# Patient Record
Sex: MALE | Race: WHITE | Employment: PART TIME | URBAN - METROPOLITAN AREA
[De-identification: names, ages, dates, MRNs, and addresses within clinical notes are randomized per-mention and may not be internally consistent; named-entity substitution may affect disease eponyms.]

---

## 2019-12-19 ENCOUNTER — APPOINTMENT (OUTPATIENT)
Dept: GENERAL RADIOLOGY | Age: 49
End: 2019-12-19

## 2019-12-19 ENCOUNTER — HOSPITAL ENCOUNTER (EMERGENCY)
Age: 49
Discharge: HOME | End: 2019-12-19
Attending: EMERGENCY MEDICINE

## 2019-12-19 VITALS
BODY MASS INDEX: 25.9 KG/M2 | DIASTOLIC BLOOD PRESSURE: 77 MMHG | WEIGHT: 185 LBS | TEMPERATURE: 97.4 F | HEIGHT: 71 IN | HEART RATE: 74 BPM | OXYGEN SATURATION: 98 % | SYSTOLIC BLOOD PRESSURE: 126 MMHG | RESPIRATION RATE: 14 BRPM
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
## 2019-12-19 DIAGNOSIS — S30.0XXA CONTUSION OF COCCYX, INITIAL ENCOUNTER: Primary | ICD-10-CM

## 2019-12-19 PROCEDURE — 72220 X-RAY EXAM SACRUM TAILBONE: CPT

## 2019-12-19 PROCEDURE — 6370000000 HC RX 637 (ALT 250 FOR IP): Performed by: NURSE PRACTITIONER

## 2019-12-19 PROCEDURE — 72100 X-RAY EXAM L-S SPINE 2/3 VWS: CPT

## 2019-12-19 PROCEDURE — 99284 EMERGENCY DEPT VISIT MOD MDM: CPT

## 2019-12-19 RX ORDER — IBUPROFEN 800 MG/1
800 TABLET ORAL ONCE
Status: COMPLETED | OUTPATIENT
Start: 2019-12-19 | End: 2019-12-19

## 2019-12-19 RX ORDER — IBUPROFEN 800 MG/1
800 TABLET ORAL EVERY 8 HOURS PRN
Qty: 30 TABLET | Refills: 0 | Status: SHIPPED | OUTPATIENT
Start: 2019-12-19 | End: 2020-12-15

## 2019-12-19 RX ADMIN — IBUPROFEN 800 MG: 800 TABLET, FILM COATED ORAL at 10:13

## 2019-12-19 ASSESSMENT — PAIN DESCRIPTION - DESCRIPTORS: DESCRIPTORS: TENDER

## 2019-12-19 ASSESSMENT — ENCOUNTER SYMPTOMS
COLOR CHANGE: 1
BACK PAIN: 1

## 2019-12-19 ASSESSMENT — PAIN DESCRIPTION - FREQUENCY: FREQUENCY: INTERMITTENT

## 2019-12-19 ASSESSMENT — PAIN SCALES - GENERAL
PAINLEVEL_OUTOF10: 7
PAINLEVEL_OUTOF10: 7

## 2019-12-19 ASSESSMENT — PAIN DESCRIPTION - LOCATION: LOCATION: BACK

## 2020-02-01 ENCOUNTER — HOSPITAL ENCOUNTER (OUTPATIENT)
Age: 50
Discharge: HOME | End: 2020-02-01
Payer: MEDICAID

## 2020-02-01 LAB
ABSOLUTE EOS #: 0.07 K/UL (ref 0–0.44)
ABSOLUTE IMMATURE GRANULOCYTE: <0.03 K/UL (ref 0–0.3)
ABSOLUTE LYMPH #: 1.99 K/UL (ref 1.1–3.7)
ABSOLUTE MONO #: 0.81 K/UL (ref 0.1–1.2)
ALBUMIN SERPL-MCNC: 4.2 G/DL (ref 3.5–5.2)
ALBUMIN/GLOBULIN RATIO: 1.6 (ref 1–2.5)
ALP BLD-CCNC: 88 U/L (ref 40–129)
ALT SERPL-CCNC: 23 U/L (ref 5–41)
ANION GAP SERPL CALCULATED.3IONS-SCNC: 10 MMOL/L (ref 9–17)
AST SERPL-CCNC: 24 U/L
BASOPHILS # BLD: 1 % (ref 0–2)
BASOPHILS ABSOLUTE: 0.03 K/UL (ref 0–0.2)
BILIRUB SERPL-MCNC: 0.2 MG/DL (ref 0.3–1.2)
BUN BLDV-MCNC: 11 MG/DL (ref 6–20)
BUN/CREAT BLD: ABNORMAL (ref 9–20)
CALCIUM SERPL-MCNC: 9.3 MG/DL (ref 8.6–10.4)
CHLORIDE BLD-SCNC: 91 MMOL/L (ref 98–107)
CHOLESTEROL/HDL RATIO: 2.3
CHOLESTEROL: 144 MG/DL
CO2: 26 MMOL/L (ref 20–31)
CREAT SERPL-MCNC: 0.7 MG/DL (ref 0.7–1.2)
DIFFERENTIAL TYPE: ABNORMAL
EOSINOPHILS RELATIVE PERCENT: 1 % (ref 1–4)
GFR AFRICAN AMERICAN: >60 ML/MIN
GFR NON-AFRICAN AMERICAN: >60 ML/MIN
GFR SERPL CREATININE-BSD FRML MDRD: ABNORMAL ML/MIN/{1.73_M2}
GFR SERPL CREATININE-BSD FRML MDRD: ABNORMAL ML/MIN/{1.73_M2}
GLUCOSE BLD-MCNC: 101 MG/DL (ref 70–99)
HCT VFR BLD CALC: 41.1 % (ref 40.7–50.3)
HDLC SERPL-MCNC: 62 MG/DL
HEMOGLOBIN: 14 G/DL (ref 13–17)
IMMATURE GRANULOCYTES: 0 %
LDL CHOLESTEROL: 56 MG/DL (ref 0–130)
LYMPHOCYTES # BLD: 34 % (ref 24–43)
MCH RBC QN AUTO: 33.3 PG (ref 25.2–33.5)
MCHC RBC AUTO-ENTMCNC: 34.1 G/DL (ref 28.4–34.8)
MCV RBC AUTO: 97.9 FL (ref 82.6–102.9)
MONOCYTES # BLD: 14 % (ref 3–12)
NRBC AUTOMATED: 0 PER 100 WBC
PDW BLD-RTO: 11.9 % (ref 11.8–14.4)
PLATELET # BLD: 290 K/UL (ref 138–453)
PLATELET ESTIMATE: ABNORMAL
PMV BLD AUTO: 9 FL (ref 8.1–13.5)
POTASSIUM SERPL-SCNC: 4.1 MMOL/L (ref 3.7–5.3)
RBC # BLD: 4.2 M/UL (ref 4.21–5.77)
RBC # BLD: ABNORMAL 10*6/UL
SEG NEUTROPHILS: 50 % (ref 36–65)
SEGMENTED NEUTROPHILS ABSOLUTE COUNT: 2.94 K/UL (ref 1.5–8.1)
SODIUM BLD-SCNC: 127 MMOL/L (ref 135–144)
TOTAL PROTEIN: 6.9 G/DL (ref 6.4–8.3)
TRIGL SERPL-MCNC: 128 MG/DL
TSH SERPL DL<=0.05 MIU/L-ACNC: 1.41 MIU/L (ref 0.3–5)
VLDLC SERPL CALC-MCNC: NORMAL MG/DL (ref 1–30)
WBC # BLD: 5.9 K/UL (ref 3.5–11.3)
WBC # BLD: ABNORMAL 10*3/UL

## 2020-02-01 PROCEDURE — 80053 COMPREHEN METABOLIC PANEL: CPT

## 2020-02-01 PROCEDURE — 80061 LIPID PANEL: CPT

## 2020-02-01 PROCEDURE — 84443 ASSAY THYROID STIM HORMONE: CPT

## 2020-02-01 PROCEDURE — 36415 COLL VENOUS BLD VENIPUNCTURE: CPT

## 2020-02-01 PROCEDURE — 85025 COMPLETE CBC W/AUTO DIFF WBC: CPT

## 2020-05-11 ENCOUNTER — HOSPITAL ENCOUNTER (OUTPATIENT)
Age: 50
Setting detail: SPECIMEN
Discharge: HOME | End: 2020-05-11
Payer: MEDICAID

## 2020-05-11 LAB
ABSOLUTE EOS #: 0.05 K/UL (ref 0–0.44)
ABSOLUTE IMMATURE GRANULOCYTE: <0.03 K/UL (ref 0–0.3)
ABSOLUTE LYMPH #: 1.92 K/UL (ref 1.1–3.7)
ABSOLUTE MONO #: 0.78 K/UL (ref 0.1–1.2)
BASOPHILS # BLD: 0 % (ref 0–2)
BASOPHILS ABSOLUTE: 0.03 K/UL (ref 0–0.2)
DIFFERENTIAL TYPE: NORMAL
EOSINOPHILS RELATIVE PERCENT: 1 % (ref 1–4)
HCT VFR BLD CALC: 43.1 % (ref 40.7–50.3)
HEMOGLOBIN: 14.4 G/DL (ref 13–17)
IMMATURE GRANULOCYTES: 0 %
LYMPHOCYTES # BLD: 28 % (ref 24–43)
MCH RBC QN AUTO: 32.2 PG (ref 25.2–33.5)
MCHC RBC AUTO-ENTMCNC: 33.4 G/DL (ref 28.4–34.8)
MCV RBC AUTO: 96.4 FL (ref 82.6–102.9)
MONOCYTES # BLD: 11 % (ref 3–12)
NRBC AUTOMATED: 0 PER 100 WBC
PDW BLD-RTO: 12.9 % (ref 11.8–14.4)
PLATELET # BLD: 343 K/UL (ref 138–453)
PLATELET ESTIMATE: NORMAL
PMV BLD AUTO: 9.8 FL (ref 8.1–13.5)
RBC # BLD: 4.47 M/UL (ref 4.21–5.77)
RBC # BLD: NORMAL 10*6/UL
SEG NEUTROPHILS: 60 % (ref 36–65)
SEGMENTED NEUTROPHILS ABSOLUTE COUNT: 4.06 K/UL (ref 1.5–8.1)
TSH SERPL DL<=0.05 MIU/L-ACNC: 1.35 MIU/L (ref 0.3–5)
WBC # BLD: 6.9 K/UL (ref 3.5–11.3)
WBC # BLD: NORMAL 10*3/UL

## 2020-08-18 ENCOUNTER — OFFICE VISIT (OUTPATIENT)
Dept: ORTHOPEDIC SURGERY | Age: 50
End: 2020-08-18
Payer: MEDICAID

## 2020-08-18 PROBLEM — M51.369 DDD (DEGENERATIVE DISC DISEASE), LUMBAR: Status: ACTIVE | Noted: 2020-08-18

## 2020-08-18 PROBLEM — M51.36 DDD (DEGENERATIVE DISC DISEASE), LUMBAR: Status: ACTIVE | Noted: 2020-08-18

## 2020-08-18 PROBLEM — M54.2 CERVICAL PAIN: Status: ACTIVE | Noted: 2020-08-18

## 2020-08-18 PROBLEM — M50.30 DDD (DEGENERATIVE DISC DISEASE), CERVICAL: Status: ACTIVE | Noted: 2020-08-18

## 2020-08-18 PROBLEM — S33.5XXA LUMBAR SPRAIN: Status: ACTIVE | Noted: 2020-08-18

## 2020-08-18 PROBLEM — M54.50 ACUTE LOW BACK PAIN: Status: ACTIVE | Noted: 2020-08-18

## 2020-08-18 PROBLEM — M43.10 ACQUIRED SPONDYLOLISTHESIS: Status: ACTIVE | Noted: 2020-08-18

## 2020-08-18 PROBLEM — S13.9XXA NECK SPRAIN: Status: ACTIVE | Noted: 2020-08-18

## 2020-08-18 PROBLEM — F17.200 SMOKER: Status: ACTIVE | Noted: 2020-08-18

## 2020-08-18 PROCEDURE — 4004F PT TOBACCO SCREEN RCVD TLK: CPT | Performed by: ORTHOPAEDIC SURGERY

## 2020-08-18 PROCEDURE — 99203 OFFICE O/P NEW LOW 30 MIN: CPT | Performed by: ORTHOPAEDIC SURGERY

## 2020-08-18 PROCEDURE — G8427 DOCREV CUR MEDS BY ELIG CLIN: HCPCS | Performed by: ORTHOPAEDIC SURGERY

## 2020-08-18 PROCEDURE — G8419 CALC BMI OUT NRM PARAM NOF/U: HCPCS | Performed by: ORTHOPAEDIC SURGERY
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
## 2020-08-22 ENCOUNTER — HOSPITAL ENCOUNTER (EMERGENCY)
Age: 50
Discharge: HOME | End: 2020-08-22
Attending: EMERGENCY MEDICINE
Payer: MEDICAID

## 2020-08-22 ENCOUNTER — APPOINTMENT (OUTPATIENT)
Dept: CT IMAGING | Age: 50
End: 2020-08-22
Payer: MEDICAID

## 2020-08-22 VITALS
WEIGHT: 180 LBS | RESPIRATION RATE: 18 BRPM | DIASTOLIC BLOOD PRESSURE: 103 MMHG | HEART RATE: 104 BPM | OXYGEN SATURATION: 97 % | HEIGHT: 71 IN | SYSTOLIC BLOOD PRESSURE: 155 MMHG | BODY MASS INDEX: 25.2 KG/M2

## 2020-08-22 PROCEDURE — 99283 EMERGENCY DEPT VISIT LOW MDM: CPT

## 2020-08-22 PROCEDURE — 70450 CT HEAD/BRAIN W/O DYE: CPT

## 2020-08-22 PROCEDURE — 6370000000 HC RX 637 (ALT 250 FOR IP): Performed by: EMERGENCY MEDICINE

## 2020-08-22 RX ORDER — OXYMETAZOLINE HYDROCHLORIDE 0.05 G/100ML
2 SPRAY NASAL ONCE
Status: COMPLETED | OUTPATIENT
Start: 2020-08-22 | End: 2020-08-22

## 2020-08-22 RX ADMIN — NASAL DECONGESTANT 2 SPRAY: 0.05 SPRAY NASAL at 00:34

## 2020-08-22 ASSESSMENT — ENCOUNTER SYMPTOMS
SHORTNESS OF BREATH: 0
EYE REDNESS: 0
VOMITING: 0
TROUBLE SWALLOWING: 0
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
## 2020-08-22 NOTE — ED NOTES
Discharge instructions reviewed with patient. Pt denies any further need at this time. Pt voiced understanding and agreeable to plan of care. Pt ambulated out of department without any difficulty.      Aris Madera RN  08/22/20 3442
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
## 2020-08-22 NOTE — ED NOTES
Pt states that he hasn't taken his BP meds today. Pt also has a smell of ETOH.      Almas Carter RN  08/22/20 0020

## 2020-09-14 ENCOUNTER — HOSPITAL ENCOUNTER (OUTPATIENT)
Dept: PHYSICAL THERAPY | Age: 50
Setting detail: THERAPIES SERIES
Discharge: HOME | End: 2020-09-14
Payer: MEDICAID

## 2020-09-14 PROCEDURE — 97161 PT EVAL LOW COMPLEX 20 MIN: CPT

## 2020-09-14 PROCEDURE — G0283 ELEC STIM OTHER THAN WOUND: HCPCS

## 2020-09-14 PROCEDURE — 97110 THERAPEUTIC EXERCISES: CPT

## 2020-09-14 ASSESSMENT — PAIN SCALES - GENERAL: PAINLEVEL_OUTOF10: 8

## 2020-09-14 ASSESSMENT — PAIN DESCRIPTION - ORIENTATION: ORIENTATION_2: LOWER

## 2020-09-14 ASSESSMENT — PAIN DESCRIPTION - DESCRIPTORS
DESCRIPTORS: CONSTANT
DESCRIPTORS_2: CONSTANT

## 2020-09-14 ASSESSMENT — PAIN DESCRIPTION - LOCATION
LOCATION: NECK
LOCATION_2: BACK

## 2020-09-14 ASSESSMENT — PAIN DESCRIPTION - INTENSITY: RATING_2: 3

## 2020-09-14 ASSESSMENT — PAIN DESCRIPTION - DURATION: DURATION_2: CONTINUOUS

## 2020-09-14 ASSESSMENT — PAIN DESCRIPTION - FREQUENCY: FREQUENCY: CONTINUOUS
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
## 2020-10-01 ENCOUNTER — OFFICE VISIT (OUTPATIENT)
Dept: ORTHOPEDIC SURGERY | Age: 50
End: 2020-10-01
Payer: MEDICAID

## 2020-10-01 PROCEDURE — 4004F PT TOBACCO SCREEN RCVD TLK: CPT | Performed by: ORTHOPAEDIC SURGERY

## 2020-10-01 PROCEDURE — 99213 OFFICE O/P EST LOW 20 MIN: CPT | Performed by: ORTHOPAEDIC SURGERY

## 2020-10-01 PROCEDURE — 3017F COLORECTAL CA SCREEN DOC REV: CPT | Performed by: ORTHOPAEDIC SURGERY

## 2020-10-01 PROCEDURE — G8484 FLU IMMUNIZE NO ADMIN: HCPCS | Performed by: ORTHOPAEDIC SURGERY

## 2020-10-01 PROCEDURE — G8419 CALC BMI OUT NRM PARAM NOF/U: HCPCS | Performed by: ORTHOPAEDIC SURGERY

## 2020-10-01 PROCEDURE — G8427 DOCREV CUR MEDS BY ELIG CLIN: HCPCS | Performed by: ORTHOPAEDIC SURGERY

## 2020-10-01 NOTE — PROGRESS NOTES
Patient ID: Fransico Barrera is a 48 y.o. male. No chief complaint on file. HPI     Please refer to my previous clinic note    Patient is here for follow-up neck low back and right shoulder pain. We had sent the patient to physical therapy unfortunately is only been to 2 visits. Past Medical History:   Diagnosis Date    Hypertension      Past Surgical History:   Procedure Laterality Date    FINGER SURGERY       Family History   Problem Relation Age of Onset   [employer] Cancer Mother         breast    Other Mother         aortic aneursyms    Stroke Father     Other Maternal Uncle         brain aneurysms     Social History     Occupational History    Occupation:      Comment: laid off   Tobacco Use    Smoking status: Current Every Day Smoker     Packs/day: 1.00     Years: 30.00     Pack years: 30.00     Types: Cigarettes, Cigars    Smokeless tobacco: Never Used   Substance and Sexual Activity    Alcohol use: Yes     Alcohol/week: 12.0 standard drinks     Types: 12 Cans of beer per week     Comment: 12 cans a siddharth    Drug use: Not Currently     Types: Cocaine    Sexual activity: Not on file        Review of Systems         Physical Exam  Vitals signs and nursing note reviewed. Constitutional:       Appearance: He is well-developed. HENT:      Head: Normocephalic and atraumatic. Nose: Nose normal.   Eyes:      Conjunctiva/sclera: Conjunctivae normal.   Neck:      Musculoskeletal: Normal range of motion and neck supple. Pulmonary:      Effort: Pulmonary effort is normal. No respiratory distress. Musculoskeletal:      Comments: Normal gait     Skin:     General: Skin is warm and dry. Neurological:      Mental Status: He is alert and oriented to person, place, and time. Sensory: No sensory deficit. Psychiatric:         Behavior: Behavior normal.         Thought Content: Thought content normal.         Assessment:          1. Cervical pain    2.  Acute low back pain, unspecified back pain laterality, unspecified whether sciatica present    3. Neck sprain, initial encounter    4. Lumbar sprain, initial encounter    5. DDD (degenerative disc disease), cervical    6. Acquired spondylolisthesis    7. Chronic right shoulder pain        Plan:     Physical therapy    Follow-up 7 weeks    No orders of the defined types were placed in this encounter. Lizzie Knight MD    Please note that this chart was generated using voicerecognition Dragon dictation software. Although every effort was made to ensurethe accuracy of this automated transcription, some errors in transcription may haveoccurred.

## 2020-10-07 ENCOUNTER — HOSPITAL ENCOUNTER (OUTPATIENT)
Dept: PHYSICAL THERAPY | Age: 50
Setting detail: THERAPIES SERIES
Discharge: HOME | End: 2020-10-07
Payer: MEDICAID

## 2020-10-07 PROCEDURE — G0283 ELEC STIM OTHER THAN WOUND: HCPCS

## 2020-10-07 PROCEDURE — 97110 THERAPEUTIC EXERCISES: CPT

## 2020-10-07 ASSESSMENT — PAIN DESCRIPTION - DURATION: DURATION_2: INTERMITTENT

## 2020-10-07 ASSESSMENT — PAIN DESCRIPTION - ORIENTATION: ORIENTATION: LOWER

## 2020-10-07 ASSESSMENT — PAIN DESCRIPTION - FREQUENCY: FREQUENCY: CONTINUOUS

## 2020-10-07 ASSESSMENT — PAIN DESCRIPTION - LOCATION
LOCATION: BACK
LOCATION_2: NECK

## 2020-10-07 ASSESSMENT — PAIN DESCRIPTION - INTENSITY: RATING_2: 4

## 2020-10-07 ASSESSMENT — PAIN DESCRIPTION - DESCRIPTORS: DESCRIPTORS: CONSTANT

## 2020-10-07 NOTE — PROGRESS NOTES
Physical Therapy  Daily Treatment Note  Date: 10/7/2020  Patient Name: Richard Painting  MRN: [id number]     :   1970    Subjective:   PT Visit Information  Onset Date: 20  PT Insurance Information: [facility]  Total # of Visits Approved: 12  Total # of Visits to Date: 2  Subjective  Subjective: neck and low back pain  Pain Screening  Patient Currently in Pain: Yes  Pain Assessment  Pain Assessment: 0-10  Pain Location: Back  Pain Orientation: Lower  Pain Descriptors: Constant  Pain Frequency: Continuous  Pain 2  Pain Rating 2: 4  Pain Location 2: Neck  Pain Duration 2: Intermittent  Vital Signs  Patient Currently in Pain: Yes       Treatment Activities:   Exercises  Exercise 1: DKTC 10x10\"  Exercise 2: LTR 10x10\"  Exercise 3: B upper trap stretch 3x30\"  Exercise 4: neck retraction 10x10\"  Exercise 5: HP and EStim to cervical and low back prone 2 pillows on chest 20 min  Exercise 6: B hamstring stretch 3x30\"  Exercise 7: B piriformis stretch 3x30\"  Exercise 8: Lime Tband BLE 4 way hip 10x  Exercise 9: Green Tband rows/pull down BUE 3x10     Assessment:   Conditions Requiring Skilled Therapeutic Intervention  Assessment: progress with ex  REQUIRES PT FOLLOW UP: Yes  Discharge Recommendations: Home independently     Plan:    Plan  Times per week: 2x/week  Current Treatment Recommendations: Strengthening, ROM, Home Exercise Program, Modalities  Plan Comment: to continue PT per POC  Timed Code Treatment Minutes: 30 Minutes   Treatment Charges: Minutes Units   []  Ultrasound     [x]  Electrical-Stim 20 1   []  Iontophoresis     []  Traction     []  Massage       []  Eval     []  Gait     [x]  Ther Exercise 30  2    []  Manual Therapy       []  Ther Activities       []  Aquatics     []  Vasopneumatic Device     []  Neuro Re-Ed       []  Other       Total Treatment Time: 50 3        Therapy Time   Individual Concurrent Group Co-treatment   Time In 1330         Time Out 1420         Minutes 50         Timed Code Treatment Minutes: 30 Minutes     Electronically signed by: Flaquita Claudio, PT

## 2020-10-09 ENCOUNTER — HOSPITAL ENCOUNTER (OUTPATIENT)
Dept: PHYSICAL THERAPY | Age: 50
Setting detail: THERAPIES SERIES
Discharge: HOME | End: 2020-10-09
Payer: MEDICAID

## 2020-10-09 PROCEDURE — G0283 ELEC STIM OTHER THAN WOUND: HCPCS

## 2020-10-09 PROCEDURE — 97110 THERAPEUTIC EXERCISES: CPT

## 2020-10-09 ASSESSMENT — PAIN DESCRIPTION - FREQUENCY: FREQUENCY: CONTINUOUS

## 2020-10-09 ASSESSMENT — PAIN SCALES - GENERAL: PAINLEVEL_OUTOF10: 8

## 2020-10-09 ASSESSMENT — PAIN DESCRIPTION - DESCRIPTORS: DESCRIPTORS: CONSTANT

## 2020-10-09 ASSESSMENT — PAIN DESCRIPTION - LOCATION: LOCATION: BACK

## 2020-10-09 ASSESSMENT — PAIN DESCRIPTION - ORIENTATION: ORIENTATION: LOWER

## 2020-10-09 NOTE — PROGRESS NOTES
Physical Therapy  Daily Treatment Note  Date: 10/9/2020  Patient Name: Keila Weathers  MRN: [id number]     :   1970    Subjective:      PT Visit Information  Onset Date: 20  PT Insurance Information: [facility]  Total # of Visits Approved: 12  Total # of Visits to Date: 3  No Show: 2  Canceled Appointment: 0  Subjective  Subjective: no neck pain,low back pain persist  General Comment  Comments: patient did not show for PT today  Pain Screening  Patient Currently in Pain: Yes  Pain Assessment  Pain Assessment: 0-10  Pain Level: 8  Pain Location: Back  Pain Orientation: Lower  Pain Descriptors: Constant  Pain Frequency: Continuous  Vital Signs  Patient Currently in Pain: Yes       Treatment Activities:   Exercises  Exercise 1: DKTC 10x10\"  Exercise 2: LTR 10x10\"  Exercise 3: B upper trap stretch 3x30\"  Exercise 4: neck retraction 10x10\"  Exercise 5: HP and EStim to lumbar area sitting 20 min  Exercise 6: B hamstring stretch 3x30\"  Exercise 7: B piriformis stretch 3x30\"  Exercise 8: Lime Tband BLE 4 way hip 10x  Exercise 9: Green Tband rows/pull down BUE 3x10  Exercise 10: slant board stretch 3x30\"  Exercise 11: step up 8\" F/L 10x      Assessment:   Conditions Requiring Skilled Therapeutic Intervention  Assessment: progress with ex  REQUIRES PT FOLLOW UP: Yes  Discharge Recommendations: Home independently     Plan:    Plan  Times per week: 2x/week  Current Treatment Recommendations: Strengthening, ROM, Home Exercise Program, Modalities  Plan Comment: to continue PT per POC  Timed Code Treatment Minutes: 30 Minutes   Treatment Charges: Minutes Units   []  Ultrasound     [x]  Electrical-Stim 20 1   []  Iontophoresis     []  Traction     []  Massage       []  Eval     []  Gait     [x]  Ther Exercise 30  2    []  Manual Therapy       []  Ther Activities       []  Aquatics     []  Vasopneumatic Device     []  Neuro Re-Ed       []  Other       Total Treatment Time: 50 3        Therapy Time   Individual Concurrent Group Co-treatment   Time In 1350         Time Out 1440         Minutes 50         Timed Code Treatment Minutes: 30 Minutes    Electronically signed by: Elijah Butterfield PT

## 2020-10-12 ENCOUNTER — HOSPITAL ENCOUNTER (OUTPATIENT)
Dept: PHYSICAL THERAPY | Age: 50
Setting detail: THERAPIES SERIES
Discharge: HOME | End: 2020-10-12
Payer: MEDICAID

## 2020-10-12 PROCEDURE — 97110 THERAPEUTIC EXERCISES: CPT

## 2020-10-12 PROCEDURE — G0283 ELEC STIM OTHER THAN WOUND: HCPCS

## 2020-10-12 ASSESSMENT — PAIN DESCRIPTION - ORIENTATION: ORIENTATION: LOWER

## 2020-10-12 ASSESSMENT — PAIN DESCRIPTION - FREQUENCY: FREQUENCY: CONTINUOUS

## 2020-10-12 ASSESSMENT — PAIN SCALES - GENERAL: PAINLEVEL_OUTOF10: 8

## 2020-10-12 ASSESSMENT — PAIN DESCRIPTION - DESCRIPTORS: DESCRIPTORS: CONSTANT

## 2020-10-12 ASSESSMENT — PAIN DESCRIPTION - LOCATION: LOCATION: BACK

## 2020-10-12 NOTE — PROGRESS NOTES
Physical Therapy  Daily Treatment Note  Date: 10/12/2020  Patient Name: Vicenta Burton  MRN: [id number]     :   1970    PT Visit Information  Onset Date: 20  PT Insurance Information: [facility]  Total # of Visits Approved: 12  Total # of Visits to Date: 4  No Show: 2  Canceled Appointment: 0  Subjective  Subjective: Reported LBP increases with activity over weekend  Pain Screening  Patient Currently in Pain: Yes  Pain Assessment  Pain Assessment: 0-10  Pain Level: 8  Pain Location: Back  Pain Orientation: Lower  Pain Descriptors: Constant  Pain Frequency: Continuous  Vital Signs  Patient Currently in Pain: Yes       Treatment Activities:   Exercises  Exercise 1: DKTC 10x10\"  Exercise 2: LTR 10x10\"  Exercise 3: B upper trap stretch 3x30\"  Exercise 4: neck retraction 10x10\"  Exercise 5: HP and EStim to lumbar area sitting 20 min  Exercise 6: B hamstring stretch 3x30\"  Exercise 7: B piriformis stretch 3x30\"  Exercise 8: Lime Tband BLE 4 way hip 10x  Exercise 9: Green Tband rows/pull down BUE 3x10  Exercise 10: slant board stretch 3x30\"  Exercise 11: step up 8\" F/L 10x      Assessment:   Conditions Requiring Skilled Therapeutic Intervention  Assessment: progress with ex  REQUIRES PT FOLLOW UP: Yes  Discharge Recommendations: Home independently      Goals:  Short term goals  Time Frame for Short term goals: 6 visits  Short term goal 1: decrease neck and low back BY 50% so patient can sleep better at night  Short term goal 2: increase AROM cervical and lumbar to full and pain free  Short term goal 3: indep with HEP  Long term goals  Time Frame for Long term goals : 12 visits  Long term goal 1: improve oswestry score from 38 to 28% or better  Long term goal 2: improve neck disability score from 44 to 34% or better    Plan:  Cont with POC     Timed Code Treatment Minutes: 30 Minutes     Therapy Time   Individual Concurrent Group Co-treatment   Time In  0830         Time Out  0920         Minutes  50         Timed Code Treatment Minutes: 30 Minutes      Treatment Charges: Minutes Units   []  Ultrasound     [x]  Electrical-Stim 20 1   []  Iontophoresis     []  Traction     []  Massage       []  Eval     []  Gait     [x]  Ther Exercise 30  2    []  Manual Therapy       []  Ther Activities       []  Aquatics     []  Vasopneumatic Device     []  Neuro Re-Ed       []  Other       Total Treatment Time: [address] [place], [facility]

## 2020-10-27 ENCOUNTER — OFFICE VISIT (OUTPATIENT)
Dept: ORTHOPEDIC SURGERY | Age: 50
End: 2020-10-27
Payer: MEDICAID

## 2020-10-27 PROCEDURE — 3017F COLORECTAL CA SCREEN DOC REV: CPT | Performed by: ORTHOPAEDIC SURGERY

## 2020-10-27 PROCEDURE — G8419 CALC BMI OUT NRM PARAM NOF/U: HCPCS | Performed by: ORTHOPAEDIC SURGERY

## 2020-10-27 PROCEDURE — 4004F PT TOBACCO SCREEN RCVD TLK: CPT | Performed by: ORTHOPAEDIC SURGERY

## 2020-10-27 PROCEDURE — 99213 OFFICE O/P EST LOW 20 MIN: CPT | Performed by: ORTHOPAEDIC SURGERY

## 2020-10-27 PROCEDURE — G8427 DOCREV CUR MEDS BY ELIG CLIN: HCPCS | Performed by: ORTHOPAEDIC SURGERY

## 2020-10-27 PROCEDURE — G8484 FLU IMMUNIZE NO ADMIN: HCPCS | Performed by: ORTHOPAEDIC SURGERY

## 2020-10-27 RX ORDER — IBUPROFEN 800 MG/1
800 TABLET ORAL 4 TIMES DAILY PRN
Qty: 120 TABLET | Refills: 5 | Status: SHIPPED | OUTPATIENT
Start: 2020-10-27 | End: 2020-12-15

## 2020-10-27 NOTE — PROGRESS NOTES
Patient ID: Criselda Valdes is a 48 y.o. male. Chief Complaint   Patient presents with    Follow-up     neck and low back        HPI    Please refer to all of my previous clinic notes. Patient is here for follow-up neck right shoulder and low back pain predominately on the right-hand side. Neck is still little bit sore but not giving him a problem    Right shoulder pain has resolved. Patient is continued to have significant low back pain predominately in the region of the right posterior leg crest    Physical therapy did not generate satisfactory improvement          Past Medical History:   Diagnosis Date    Hypertension      Past Surgical History:   Procedure Laterality Date    FINGER SURGERY       Family History   Problem Relation Age of Onset    Cancer Mother         breast    Other Mother         aortic aneursyms    Stroke Father     Other Maternal Uncle         brain aneurysms     Social History     Occupational History    Occupation:      Comment: laid off   Tobacco Use    Smoking status: Current Every Day Smoker     Packs/day: 1.00     Years: 30.00     Pack years: 30.00     Types: Cigarettes, Cigars    Smokeless tobacco: Never Used   Substance and Sexual Activity    Alcohol use: Yes     Alcohol/week: 12.0 standard drinks     Types: 12 Cans of beer per week     Comment: 12 cans a siddharth    Drug use: Not Currently     Types: Cocaine    Sexual activity: Not on file        Review of Systems         Physical Exam  Vitals signs and nursing note reviewed. Constitutional:       Appearance: He is well-developed. HENT:      Head: Normocephalic and atraumatic. Nose: Nose normal.   Eyes:      Conjunctiva/sclera: Conjunctivae normal.   Neck:      Musculoskeletal: Normal range of motion and neck supple. Pulmonary:      Effort: Pulmonary effort is normal. No respiratory distress. Musculoskeletal:      Comments: Normal gait     Skin:     General: Skin is warm and dry. Neurological:      Mental Status: He is alert and oriented to person, place, and time. Sensory: No sensory deficit. Psychiatric:         Behavior: Behavior normal.         Thought Content: Thought content normal.         X-rays again reviewed cervical spine slight retrolisthesis at C3-4 C4-C7 cervical degenerative disc disease    Lumbar spine grade 2 spondylolisthesis L5-S1 significant disc space collapse likely ankylosis slightly greater than normal lumbar spondylosis throughout          Assessment:          1. Cervical pain    2. Acute low back pain, unspecified back pain laterality, unspecified whether sciatica present    3. Neck sprain, initial encounter    4. Lumbar sprain, initial encounter    5. DDD (degenerative disc disease), cervical    6. DDD (degenerative disc disease), lumbar    7. Acquired spondylolisthesis      Failed physical therapy    Continued 20 to 30 yard claudication symptoms    Plan:     MRI lumbar spine    No orders of the defined types were placed in this encounter. Daiana Hunter MD    Please note that this chart was generated using voicerecognition Dragon dictation software. Although every effort was made to ensurethe accuracy of this automated transcription, some errors in transcription may haveoccurred.

## 2020-11-04 ENCOUNTER — HOSPITAL ENCOUNTER (OUTPATIENT)
Dept: MRI IMAGING | Age: 50
Discharge: HOME | End: 2020-11-06
Payer: MEDICAID

## 2020-11-04 PROCEDURE — 72148 MRI LUMBAR SPINE W/O DYE: CPT

## 2020-11-17 ENCOUNTER — OFFICE VISIT (OUTPATIENT)
Dept: ORTHOPEDIC SURGERY | Age: 50
End: 2020-11-17
Payer: MEDICAID

## 2020-11-17 PROCEDURE — 3017F COLORECTAL CA SCREEN DOC REV: CPT | Performed by: ORTHOPAEDIC SURGERY

## 2020-11-17 PROCEDURE — 99213 OFFICE O/P EST LOW 20 MIN: CPT | Performed by: ORTHOPAEDIC SURGERY

## 2020-11-17 PROCEDURE — G8419 CALC BMI OUT NRM PARAM NOF/U: HCPCS | Performed by: ORTHOPAEDIC SURGERY

## 2020-11-17 PROCEDURE — G8484 FLU IMMUNIZE NO ADMIN: HCPCS | Performed by: ORTHOPAEDIC SURGERY

## 2020-11-17 PROCEDURE — G8427 DOCREV CUR MEDS BY ELIG CLIN: HCPCS | Performed by: ORTHOPAEDIC SURGERY

## 2020-11-17 PROCEDURE — 4004F PT TOBACCO SCREEN RCVD TLK: CPT | Performed by: ORTHOPAEDIC SURGERY

## 2020-11-17 NOTE — PROGRESS NOTES
Patient ID: Giorgi Martinez Sr. is a 48 y.o. male. Chief Complaint   Patient presents with    Follow-up     mri         HPI     Please refer to my previous clinic notes. Patient is here for follow-up MRI lumbar spine    Patient is continue to have pain over the right posterior superior iliac crest region. Neck and shoulder continue to do well. Patient is here for follow-up MRI lumbar    Past Medical History:   Diagnosis Date    Hypertension      Past Surgical History:   Procedure Laterality Date    FINGER SURGERY       Family History   Problem Relation Age of Onset   [facility] Cancer Mother         breast    Other Mother         aortic aneursyms    Stroke Father     Other Maternal Uncle         brain aneurysms     Social History     Occupational History    Occupation:      Comment: laid off   Tobacco Use    Smoking status: Current Every Day Smoker     Packs/day: 1.00     Years: 30.00     Pack years: 30.00     Types: Cigarettes, Cigars    Smokeless tobacco: Never Used   Substance and Sexual Activity    Alcohol use: Yes     Alcohol/week: 12.0 standard drinks     Types: 12 Cans of beer per week     Comment: 12 cans a siddharth    Drug use: Not Currently     Types: Cocaine    Sexual activity: Not on file        Review of Systems   All other systems reviewed and are negative. Physical Exam  Vitals signs and nursing note reviewed. Constitutional:       Appearance: He is well-developed. HENT:      Head: Normocephalic and atraumatic. Nose: Nose normal.   Eyes:      Conjunctiva/sclera: Conjunctivae normal.   Neck:      Musculoskeletal: Normal range of motion and neck supple. Pulmonary:      Effort: Pulmonary effort is normal. No respiratory distress. Musculoskeletal:      Comments: Normal gait     Skin:     General: Skin is warm and dry. Neurological:      Mental Status: He is alert and oriented to person, place, and time. Sensory: No sensory deficit.    Psychiatric: Behavior: Behavior normal.         Thought Content: Thought content normal.         MRI lumbar spine is reviewed patient with spondylitic spondylolisthesis high-grade to with near complete disc base collapse L5-S1    Assessment:          1. Acquired spondylolisthesis    2. DDD (degenerative disc disease), cervical    3. Neck sprain, initial encounter    4. Acute low back pain, unspecified back pain laterality, unspecified whether sciatica present    5. Smoker        Spondylitic spondylolisthesis L5-S1 this is not an acutely traumatic spondylitic spondylolisthesis but his recent trauma may have aggravated an underlying condition      Plan:     Lumbar epidural steroid injections    Quit smoking    No orders of the defined types were placed in this encounter. Alec Green MD    Please note that this chart was generated using voicerecognition Dragon dictation software. Although every effort was made to ensurethe accuracy of this automated transcription, some errors in transcription may haveoccurred.

## 2020-11-23 ENCOUNTER — TELEPHONE (OUTPATIENT)
Dept: PAIN MANAGEMENT | Age: 50
End: 2020-11-23

## 2021-01-26 ENCOUNTER — OFFICE VISIT (OUTPATIENT)
Dept: ORTHOPEDIC SURGERY | Age: 51
End: 2021-01-26
Payer: MEDICAID

## 2021-01-26 DIAGNOSIS — M50.30 DDD (DEGENERATIVE DISC DISEASE), CERVICAL: ICD-10-CM

## 2021-01-26 DIAGNOSIS — M43.10 ACQUIRED SPONDYLOLISTHESIS: Primary | ICD-10-CM

## 2021-01-26 DIAGNOSIS — M51.36 DDD (DEGENERATIVE DISC DISEASE), LUMBAR: ICD-10-CM

## 2021-01-26 PROCEDURE — 4004F PT TOBACCO SCREEN RCVD TLK: CPT | Performed by: ORTHOPAEDIC SURGERY

## 2021-01-26 PROCEDURE — G8419 CALC BMI OUT NRM PARAM NOF/U: HCPCS | Performed by: ORTHOPAEDIC SURGERY

## 2021-01-26 PROCEDURE — G8427 DOCREV CUR MEDS BY ELIG CLIN: HCPCS | Performed by: ORTHOPAEDIC SURGERY

## 2021-01-26 PROCEDURE — G8484 FLU IMMUNIZE NO ADMIN: HCPCS | Performed by: ORTHOPAEDIC SURGERY

## 2021-01-26 PROCEDURE — 99213 OFFICE O/P EST LOW 20 MIN: CPT | Performed by: ORTHOPAEDIC SURGERY

## 2021-01-26 PROCEDURE — 3017F COLORECTAL CA SCREEN DOC REV: CPT | Performed by: ORTHOPAEDIC SURGERY

## 2021-01-26 NOTE — PROGRESS NOTES
Patient ID: Atul Bravo Sr. is a 48 y.o. male. No chief complaint on file. HPI     Please refer to all of my previous clinic notes    Patient is here for follow-up spondylitic spondylolisthesis L5-S1. Patient reports he become symptomatic if he does a lot of walking    Nevertheless at this time he reports that he is tolerant of his back health. Past Medical History:   Diagnosis Date    Hypertension      Past Surgical History:   Procedure Laterality Date    FINGER SURGERY       Family History   Problem Relation Age of Onset   [facility] Cancer Mother         breast    Other Mother         aortic aneursyms    Stroke Father     Other Maternal Uncle         brain aneurysms     Social History     Occupational History    Occupation:      Comment: laid off   Tobacco Use    Smoking status: Current Every Day Smoker     Packs/day: 1.00     Years: 30.00     Pack years: 30.00     Types: Cigarettes, Cigars    Smokeless tobacco: Never Used   Substance and Sexual Activity    Alcohol use: Yes     Alcohol/week: 12.0 standard drinks     Types: 12 Cans of beer per week     Comment: 12 cans, 3 times a week     Drug use: Not Currently     Types: Cocaine     Comment: Cocaine one year ago    Sexual activity: Not on file        Review of Systems   All other systems reviewed and are negative. Physical Exam  Vitals signs and nursing note reviewed. Constitutional:       Appearance: He is well-developed. HENT:      Head: Normocephalic and atraumatic. Nose: Nose normal.   Eyes:      Conjunctiva/sclera: Conjunctivae normal.   Neck:      Musculoskeletal: Normal range of motion and neck supple. Pulmonary:      Effort: Pulmonary effort is normal. No respiratory distress. Musculoskeletal:      Comments: Normal gait     Skin:     General: Skin is warm and dry. Neurological:      Mental Status: He is alert and oriented to person, place, and time. Sensory: No sensory deficit. Psychiatric:         Behavior: Behavior normal.         Thought Content: Thought content normal.     MRI and flexion-extension views again reviewed the lumbar spine patient with slightly greater than average spondylosis high grade 2 spondylitic spondylolisthesis L5-S1 with near dissipates collapse patient does have just a smidge of motion on flexion and extension no other significant pathology  Assessment:          1. Acquired spondylolisthesis    2. DDD (degenerative disc disease), cervical    3. DDD (degenerative disc disease), lumbar        If patient were having substantial symptoms at this time he would be a surgical candidate however he reports he is tolerant of his spine health        Plan:     Follow-up as needed    No orders of the defined types were placed in this encounter. Richad Scheuermann, MD    Please note that this chart was generated using voicerecognition Dragon dictation software. Although every effort was made to ensurethe accuracy of this automated transcription, some errors in transcription may haveoccurred.

## 2022-05-02 ENCOUNTER — TELEPHONE (OUTPATIENT)
Dept: GASTROENTEROLOGY | Age: 52
End: 2022-05-02

## 2023-06-08 ENCOUNTER — HOSPITAL ENCOUNTER (EMERGENCY)
Age: 53
Discharge: LEFT BEFORE DISCHARGE | End: 2023-06-08
Attending: EMERGENCY MEDICINE
Payer: MEDICAID

## 2023-06-08 ENCOUNTER — APPOINTMENT (OUTPATIENT)
Dept: GENERAL RADIOLOGY | Age: 53
End: 2023-06-08
Payer: MEDICAID

## 2023-06-08 VITALS
HEIGHT: 71 IN | OXYGEN SATURATION: 98 % | SYSTOLIC BLOOD PRESSURE: 178 MMHG | HEART RATE: 76 BPM | RESPIRATION RATE: 16 BRPM | TEMPERATURE: 98.1 F | WEIGHT: 170 LBS | DIASTOLIC BLOOD PRESSURE: 103 MMHG | BODY MASS INDEX: 23.8 KG/M2

## 2023-06-08 DIAGNOSIS — S90.32XA CONTUSION OF FOOT INCLUDING TOES, LEFT, INITIAL ENCOUNTER: Primary | ICD-10-CM

## 2023-06-08 DIAGNOSIS — S90.122A CONTUSION OF FOOT INCLUDING TOES, LEFT, INITIAL ENCOUNTER: Primary | ICD-10-CM

## 2023-06-08 PROCEDURE — 6370000000 HC RX 637 (ALT 250 FOR IP): Performed by: EMERGENCY MEDICINE

## 2023-06-08 PROCEDURE — 73630 X-RAY EXAM OF FOOT: CPT

## 2023-06-08 RX ORDER — IBUPROFEN 800 MG/1
800 TABLET ORAL ONCE
Status: COMPLETED | OUTPATIENT
Start: 2023-06-08 | End: 2023-06-08

## 2023-06-08 RX ADMIN — IBUPROFEN 800 MG: 800 TABLET, FILM COATED ORAL at 09:55

## 2023-06-08 ASSESSMENT — PAIN DESCRIPTION - LOCATION: LOCATION: TOE (COMMENT WHICH ONE)

## 2023-06-08 ASSESSMENT — PAIN - FUNCTIONAL ASSESSMENT: PAIN_FUNCTIONAL_ASSESSMENT: 0-10

## 2023-06-08 ASSESSMENT — ENCOUNTER SYMPTOMS
CHEST TIGHTNESS: 0
EYE DISCHARGE: 0
EYE PAIN: 0
ABDOMINAL DISTENTION: 0
FACIAL SWELLING: 0
SHORTNESS OF BREATH: 0
BACK PAIN: 0
ABDOMINAL PAIN: 0

## 2023-06-08 ASSESSMENT — PAIN DESCRIPTION - PAIN TYPE: TYPE: ACUTE PAIN

## 2023-06-08 ASSESSMENT — PAIN DESCRIPTION - ORIENTATION: ORIENTATION: RIGHT

## 2023-06-08 ASSESSMENT — PAIN DESCRIPTION - DESCRIPTORS: DESCRIPTORS: SORE;TENDER;SHARP

## 2023-06-08 ASSESSMENT — PAIN SCALES - GENERAL: PAINLEVEL_OUTOF10: 8

## 2023-06-08 NOTE — ED PROVIDER NOTES
consulting clinician:  no    MIPS:  NA    Social determinants of health impacting treatment or disposition:  none    Shared Decision Making: The patient was involved in his/her plan of care through shared decision making. The testing that was ordered was discussed with the patient. Any medications that may have been ordered were discussed with the patient    Code Status Discussion:  full code    \"ED Course\" Notes From Epic Narrator:         CRITICAL CARE:       PROCEDURES:    Procedures      DATA FOR LAB AND RADIOLOGY TESTS ORDERED BELOW ARE REVIEWED BY THE ED CLINICIAN:    RADIOLOGY: All x-rays, CT, MRI, and formal ultrasound images (except ED bedside ultrasound) are read by the radiologist, see reports below, unless otherwise noted in MDM or here. Reports below are reviewed by myself. XR FOOT RIGHT (MIN 3 VIEWS)   Final Result   No acute osseous abnormality. LABS: Lab orders shown below, the results are reviewed by myself, and all abnormals are listed below. Labs Reviewed - No data to display    Vitals Reviewed:    Vitals:    06/08/23 0946   BP: (!) 178/103   Pulse: 76   Resp: 16   Temp: 98.1 °F (36.7 °C)   TempSrc: Oral   SpO2: 98%   Weight: 170 lb (77.1 kg)   Height: 5' 11\" (1.803 m)     MEDICATIONS GIVEN TO PATIENT THIS ENCOUNTER:  Orders Placed This Encounter   Medications    ibuprofen (ADVIL;MOTRIN) tablet 800 mg     DISCHARGE PRESCRIPTIONS:  New Prescriptions    No medications on file     PHYSICIAN CONSULTS ORDERED THIS ENCOUNTER:  None  FINAL IMPRESSION      1. Contusion of foot including toes, left, initial encounter          DISPOSITION/PLAN   DISPOSITION        OUTPATIENT FOLLOW UP THE PATIENT:  No follow-up provider specified.     MD Amy Agudelo MD  83/80/31 [id number]

## 2024-01-14 ENCOUNTER — HOSPITAL ENCOUNTER (INPATIENT)
Age: 54
LOS: 12 days | Discharge: HOME | End: 2024-01-26
Attending: EMERGENCY MEDICINE | Admitting: PSYCHIATRY & NEUROLOGY
Payer: COMMERCIAL

## 2024-01-14 ENCOUNTER — APPOINTMENT (OUTPATIENT)
Dept: CT IMAGING | Age: 54
End: 2024-01-14
Payer: COMMERCIAL

## 2024-01-14 DIAGNOSIS — I61.9 INTRAPARENCHYMAL HEMORRHAGE OF BRAIN (HCC): ICD-10-CM

## 2024-01-14 DIAGNOSIS — I62.9 INTRACRANIAL HEMORRHAGE (HCC): Primary | ICD-10-CM

## 2024-01-14 LAB
ANION GAP SERPL CALCULATED.3IONS-SCNC: 14 MMOL/L (ref 9–17)
BASOPHILS # BLD: 0.05 K/UL (ref 0–0.2)
BASOPHILS NFR BLD: 1 % (ref 0–2)
BUN BLD-MCNC: 13 MG/DL (ref 8–26)
BUN SERPL-MCNC: 12 MG/DL (ref 6–20)
CA-I BLD-SCNC: 1.14 MMOL/L (ref 1.15–1.33)
CALCIUM SERPL-MCNC: 9 MG/DL (ref 8.6–10.4)
CHLORIDE BLD-SCNC: 94 MMOL/L (ref 98–107)
CHLORIDE SERPL-SCNC: 92 MMOL/L (ref 98–107)
CK SERPL-CCNC: 252 U/L (ref 39–308)
CO2 BLD CALC-SCNC: 27 MMOL/L (ref 22–30)
CO2 SERPL-SCNC: 23 MMOL/L (ref 20–31)
CREAT SERPL-MCNC: 0.7 MG/DL (ref 0.7–1.2)
EGFR, POC: >60 ML/MIN/1.73M2
EOSINOPHIL # BLD: 0.49 K/UL (ref 0–0.44)
EOSINOPHILS RELATIVE PERCENT: 6 % (ref 1–4)
ERYTHROCYTE [DISTWIDTH] IN BLOOD BY AUTOMATED COUNT: 12.1 % (ref 11.8–14.4)
GFR SERPL CREATININE-BSD FRML MDRD: >60 ML/MIN/1.73M2
GLUCOSE BLD-MCNC: 90 MG/DL (ref 74–100)
GLUCOSE SERPL-MCNC: 91 MG/DL (ref 70–99)
HCO3 VENOUS: 27.8 MMOL/L (ref 22–29)
HCT VFR BLD AUTO: 40.8 % (ref 40.7–50.3)
HCT VFR BLD AUTO: 45 % (ref 41–53)
HGB BLD-MCNC: 14.2 G/DL (ref 13–17)
IMM GRANULOCYTES # BLD AUTO: <0.03 K/UL (ref 0–0.3)
IMM GRANULOCYTES NFR BLD: 0 %
INR PPP: 0.9
LYMPHOCYTES NFR BLD: 2.49 K/UL (ref 1.1–3.7)
LYMPHOCYTES RELATIVE PERCENT: 28 % (ref 24–43)
MCH RBC QN AUTO: 33.5 PG (ref 25.2–33.5)
MCHC RBC AUTO-ENTMCNC: 34.8 G/DL (ref 28.4–34.8)
MCV RBC AUTO: 96.2 FL (ref 82.6–102.9)
MONOCYTES NFR BLD: 1.01 K/UL (ref 0.1–1.2)
MONOCYTES NFR BLD: 12 % (ref 3–12)
MYOGLOBIN SERPL-MCNC: 41 NG/ML (ref 28–72)
NEUTROPHILS NFR BLD: 53 % (ref 36–65)
NEUTS SEG NFR BLD: 4.72 K/UL (ref 1.5–8.1)
NRBC BLD-RTO: 0 PER 100 WBC
O2 SAT, VEN: 33.1 % (ref 60–85)
PARTIAL THROMBOPLASTIN TIME: 27.8 SEC (ref 23–36.5)
PCO2, VEN: 43.9 MM HG (ref 41–51)
PH VENOUS: 7.41 (ref 7.32–7.43)
PLATELET # BLD AUTO: 321 K/UL (ref 138–453)
PMV BLD AUTO: 9.2 FL (ref 8.1–13.5)
PO2, VEN: 20.4 MM HG (ref 30–50)
POC ANION GAP: 13 MMOL/L (ref 7–16)
POC CREATININE: 0.7 MG/DL (ref 0.51–1.19)
POC HEMOGLOBIN (CALC): 15.2 G/DL (ref 13.5–17.5)
POC LACTIC ACID: 0.9 MMOL/L (ref 0.56–1.39)
POSITIVE BASE EXCESS, VEN: 2.6 MMOL/L (ref 0–3)
POTASSIUM BLD-SCNC: 3.8 MMOL/L (ref 3.5–4.5)
POTASSIUM SERPL-SCNC: 3.8 MMOL/L (ref 3.7–5.3)
PROTHROMBIN TIME: 12 SEC (ref 11.7–14.9)
RBC # BLD AUTO: 4.24 M/UL (ref 4.21–5.77)
SODIUM BLD-SCNC: 133 MMOL/L (ref 138–146)
SODIUM SERPL-SCNC: 129 MMOL/L (ref 135–144)
TROPONIN I SERPL HS-MCNC: 10 NG/L (ref 0–22)
WBC OTHER # BLD: 8.8 K/UL (ref 3.5–11.3)

## 2024-01-14 PROCEDURE — 82565 ASSAY OF CREATININE: CPT

## 2024-01-14 PROCEDURE — 85730 THROMBOPLASTIN TIME PARTIAL: CPT

## 2024-01-14 PROCEDURE — 85610 PROTHROMBIN TIME: CPT

## 2024-01-14 PROCEDURE — 84520 ASSAY OF UREA NITROGEN: CPT

## 2024-01-14 PROCEDURE — 83605 ASSAY OF LACTIC ACID: CPT

## 2024-01-14 PROCEDURE — 99255 IP/OBS CONSLTJ NEW/EST HI 80: CPT | Performed by: PSYCHIATRY & NEUROLOGY

## 2024-01-14 PROCEDURE — 93005 ELECTROCARDIOGRAM TRACING: CPT

## 2024-01-14 PROCEDURE — 83874 ASSAY OF MYOGLOBIN: CPT

## 2024-01-14 PROCEDURE — 99291 CRITICAL CARE FIRST HOUR: CPT | Performed by: PSYCHIATRY & NEUROLOGY

## 2024-01-14 PROCEDURE — 2000000000 HC ICU R&B

## 2024-01-14 PROCEDURE — 80051 ELECTROLYTE PANEL: CPT

## 2024-01-14 PROCEDURE — 82330 ASSAY OF CALCIUM: CPT

## 2024-01-14 PROCEDURE — 85025 COMPLETE CBC W/AUTO DIFF WBC: CPT

## 2024-01-14 PROCEDURE — 82550 ASSAY OF CK (CPK): CPT

## 2024-01-14 PROCEDURE — 96374 THER/PROPH/DIAG INJ IV PUSH: CPT

## 2024-01-14 PROCEDURE — 70450 CT HEAD/BRAIN W/O DYE: CPT

## 2024-01-14 PROCEDURE — 85014 HEMATOCRIT: CPT

## 2024-01-14 PROCEDURE — 82553 CREATINE MB FRACTION: CPT

## 2024-01-14 PROCEDURE — 84484 ASSAY OF TROPONIN QUANT: CPT

## 2024-01-14 PROCEDURE — 70498 CT ANGIOGRAPHY NECK: CPT

## 2024-01-14 PROCEDURE — 80048 BASIC METABOLIC PNL TOTAL CA: CPT

## 2024-01-14 PROCEDURE — 6360000004 HC RX CONTRAST MEDICATION

## 2024-01-14 PROCEDURE — 6360000002 HC RX W HCPCS

## 2024-01-14 PROCEDURE — 82947 ASSAY GLUCOSE BLOOD QUANT: CPT

## 2024-01-14 PROCEDURE — 99285 EMERGENCY DEPT VISIT HI MDM: CPT

## 2024-01-14 PROCEDURE — 82803 BLOOD GASES ANY COMBINATION: CPT

## 2024-01-14 RX ORDER — LABETALOL HYDROCHLORIDE 5 MG/ML
10 INJECTION, SOLUTION INTRAVENOUS EVERY 4 HOURS PRN
Status: DISCONTINUED | OUTPATIENT
Start: 2024-01-14 | End: 2024-01-15

## 2024-01-14 RX ORDER — NICARDIPINE HYDROCHLORIDE 0.1 MG/ML
2.5-15 INJECTION INTRAVENOUS CONTINUOUS
Status: DISCONTINUED | OUTPATIENT
Start: 2024-01-14 | End: 2024-01-15

## 2024-01-14 RX ORDER — ONDANSETRON 2 MG/ML
4 INJECTION INTRAMUSCULAR; INTRAVENOUS EVERY 6 HOURS PRN
Status: DISCONTINUED | OUTPATIENT
Start: 2024-01-14 | End: 2024-01-26 | Stop reason: HOSPADM

## 2024-01-14 RX ORDER — SODIUM CHLORIDE 0.9 % (FLUSH) 0.9 %
5-40 SYRINGE (ML) INJECTION PRN
Status: DISCONTINUED | OUTPATIENT
Start: 2024-01-14 | End: 2024-01-26 | Stop reason: HOSPADM

## 2024-01-14 RX ORDER — SODIUM CHLORIDE 9 MG/ML
INJECTION, SOLUTION INTRAVENOUS PRN
Status: DISCONTINUED | OUTPATIENT
Start: 2024-01-14 | End: 2024-01-26 | Stop reason: HOSPADM

## 2024-01-14 RX ORDER — ONDANSETRON 4 MG/1
4 TABLET, ORALLY DISINTEGRATING ORAL EVERY 8 HOURS PRN
Status: DISCONTINUED | OUTPATIENT
Start: 2024-01-14 | End: 2024-01-26 | Stop reason: HOSPADM

## 2024-01-14 RX ORDER — ACETAMINOPHEN 325 MG/1
650 TABLET ORAL EVERY 4 HOURS PRN
Status: DISCONTINUED | OUTPATIENT
Start: 2024-01-14 | End: 2024-01-26 | Stop reason: HOSPADM

## 2024-01-14 RX ORDER — SODIUM CHLORIDE 0.9 % (FLUSH) 0.9 %
5-40 SYRINGE (ML) INJECTION EVERY 12 HOURS SCHEDULED
Status: DISCONTINUED | OUTPATIENT
Start: 2024-01-14 | End: 2024-01-26 | Stop reason: HOSPADM

## 2024-01-14 RX ORDER — NICARDIPINE HYDROCHLORIDE 0.1 MG/ML
2.5-15 INJECTION INTRAVENOUS CONTINUOUS
Status: DISCONTINUED | OUTPATIENT
Start: 2024-01-14 | End: 2024-01-14

## 2024-01-14 RX ADMIN — IOPAMIDOL 90 ML: 755 INJECTION, SOLUTION INTRAVENOUS at 18:34

## 2024-01-14 RX ADMIN — NICARDIPINE HYDROCHLORIDE 5 MG/HR: 0.1 INJECTION INTRAVENOUS at 18:35

## 2024-01-14 NOTE — ED NOTES
Chief Complaint   Patient presents with   â¢ Derm Problem     Acne    â¢ Video Visit     Referred from:  Ani Bell MD / PCP:  Yony Becerra DO  History of present illness:  Ernest Schilder presents with:    Complaint:Â Â acne  Duration:Â Â Years  Location:Â Â Back, chest, arms, face  Previous treatments:Â  isotretinoin with partial improvement  Â   Tolerating isotret well, denies muscle pain. Has dry skin and lips, stable mental health. Â   He started this round of isotret in Sep 2021. This is his second round of isotretinoin with our clinic. Past dermatologic specific history:   Acne  EczemaÂ       Review of systems:   Constitutional: No fevers, no chills, no unintentional weight loss   Skin: no other skin complaints    Physical examination:   General: well developed, well nourished, in no acute distress. SKIN    Erythematous, acneiform papules, without pustules, without comedones, with scarring/hyperpigmentation on the face     Assessment and plan:   Diagnoses and all orders for this visit:  Cheilitis  Inflammatory acne  -     ISOtretinoin 40 MG capsule; Take 1 capsule by mouth once daily. Take with largest meal of the day. Postinflammatory hyperpigmentation  Xerosis cutis  High risk medication use  Acne scarring     Patient has dry skin and dry lips (xerosis, cheilitis) secondary to isotretinoin side effect. These side effects are tolerable. Patient reports stable mental health over the past month. Patient overall doing well on isotretinoin therapy. Continue isotretinoin per plan.    ------------------  iPledge HMQFAU:[id number]  Goal dose: Â 10,000mg  Cumulative dose to date:Â Â 3300mg  Â   The patient signed and completed the iPLEDGE contract and was given the [address] informational booklet. Â   The patient was informed that isotretinoin is a teratogenic medication which is associated with high risk of birth defects if one were to become pregnant while taking the medication.  Â The patient will undergo Dr. Bell at bedside to assess pt.   monthly pregnancy tests while on isotretinoin. Â   Â   Side effects including dry skin, dry eyes, joint pain, depression were discussed. Â Instructed patient not to donate blood or share medicine. Patient instructed to inform other physicians and pharmacist of being on isotretinoin whenever receiving new prescriptions to avoid undesired medication interactions. Return in about 4 weeks (around 1/18/2022). On 12/21/2021, [employer] MA scribed the services personally performed by Erwin Hodgkin, MD  The documentation recorded by the scribe accurately and completely reflects the service(s) I personally performed and the decisions made by me.

## 2024-01-14 NOTE — ED PROVIDER NOTES
sounds.   Pulmonary:      Effort: Pulmonary effort is normal.      Breath sounds: Normal breath sounds.   Abdominal:      General: Abdomen is flat.      Palpations: Abdomen is soft.      Tenderness: There is no abdominal tenderness.   Skin:     General: Skin is warm and dry.   Neurological:      Mental Status: He is alert.      Comments: Right-sided facial droop, dysarthria, right upper extremity weakness           DDX/DIAGNOSTIC RESULTS / EMERGENCY DEPARTMENT COURSE / [facility]     Medical Decision Making  Jagdeep Quintana Sr. is a 53 y.o. male who presents with stroke alert critical, last known well 6 PM.  Patient is GCS 15, nontoxic appearing, not in acute distress, speaking full sentences, able to ambulate under their own power.  Patient is afebrile, hypertensive, nontachycardic, satting well on room air.  Examination reveals lungs are clear to auscultation bilaterally.  Abdomen soft nontender.  Peripheral pulses are 2+ throughout.  Neurologic exam reveals a right-sided facial droop with dysarthria, right upper extremity weakness.  Patient was taken directly to CT scanner.  CT head without contrast shows large left-sided intracranial hemorrhage.  Plan on initiating Cardene with blood pressure goal between 120-140 systolic.  Neurocritical care, neurosurgery paged.  Stroke team at bedside.  Please see their note for full NIH stroke scale.      Amount and/or Complexity of Data Reviewed  Labs: ordered.  Radiology: ordered.  ECG/medicine tests: ordered.    Risk  Prescription drug management.  Decision regarding hospitalization.        EKG  Normal sinus rhythm, ventricular rate of 85, normal axis, no ST elevations or depressions, no T wave inversions, normal sinus EKG    All EKG's are interpreted by the Emergency Department Physician who either signs or Co-signs this chart in the absence of a cardiologist.    EMERGENCY DEPARTMENT COURSE:      ED Course as of 01/14/24 2024   Sun Jan 14, 2024 1925 Neurocritical care  requesting arterial line placement.  The resident is unable to place arterial lines.  Will place arterial line emergency department. [AK]      ED Course User Index  [AK] Juan M Quiroz MD       PROCEDURES:  Arterial Line Placement Procedure Note          Performed by: Juan M Quiroz MD    Indication: intracranial disease    Consent: The patient provided verbal consent for this procedure.    [place]'s Test: Was not performed    Time out performed: Immediately prior to the procedure a \"time out\" was called to verify the correct patient, the correct procedure, equipment, support staff and site/side marked as required.      All elements of maximal sterile barrier techniques were followed.    Procedure: The skin over the right radial artery was prepped with chlorhexidine. A 22 gauge angiocath was then inserted, using a modified Seldinger technique, into the vessel.  Before the transducer could be attached and before the catheter could be sutured the patient pulled the catheter out.  Attempt at arterial line was then ceased.    The patient tolerated the procedure poorly, patient did not tolerate, pulled the catheter out.     Complications: Unable to obtain arterial line    CONSULTS:  IP CONSULT TO NEUROSURGERY  IP CONSULT TO NEUROCRITICAL CARE    CRITICAL CARE:  There was significant risk of life threatening deterioration of patient's condition requiring my direct management. Critical care time  minutes, excluding any documented procedures.    FINAL IMPRESSION      1. Intracranial hemorrhage (HCC)          DISPOSITION / PLAN     DISPOSITION Admitted 01/14/2024 07:31:44 PM      PATIENT REFERRED TO:  No follow-up provider specified.    DISCHARGE MEDICATIONS:  New Prescriptions    No medications on file       Juan M Quiroz MD  Emergency Medicine Resident    (Please note that portions of this note were completed with a voice recognition program.  Efforts were made to edit the dictations but occasionally words are

## 2024-01-14 NOTE — ED PROVIDER NOTES
[facility]     Emergency Department     Faculty Attestation    I performed a history and physical examination of the patient and discussed management with the resident. I reviewed the resident’s note and agree with the documented findings and plan of care. Any areas of disagreement are noted on the chart. I was personally present for the key portions of any procedures. I have documented in the chart those procedures where I was not present during the key portions. I have reviewed the emergency nurses triage note. I agree with the chief complaint, past medical history, past surgical history, allergies, medications, social and family history as documented unless otherwise noted below.        For Physician Assistant/ Nurse Practitioner cases/documentation I have personally evaluated this patient and have completed at least one if not all key elements of the E/M (history, physical exam, and MDM). Additional findings are as noted.  I have personally seen and evaluated the patient.  I find the patient's history and physical exam are consistent with the NP/PA documentation.  I agree with the care provided, treatment rendered, disposition and follow-up plan.    Patient complains of weakness to the right side onset of symptoms approximately 1800 today the patient arrives here as a race alert and upon CT scan reveals intraparenchymal hemorrhage neurosurgery consulted at this time      Critical Care     Brandon Bell M.D.  Attending Emergency  Physician           Brandon Bell MD  01/14/24 1901

## 2024-01-14 NOTE — ED NOTES
Pt arrived to ED via EMS.  Pt c/o RUE and RLE weakness, aphasia, and facial droop.  EMS reported LKW was 1800.  Pt found to be hypertensive, EMS reported no use of blood thinners.  On arrival, pt has incomprehensible speech but is able to follow commands with LUE and LLE.   Pt went directly to CT from ambulance bay.  Dr. Bell, Dr. Quiroz, and Neuro at bedside on arrival to CT.  Pt placed on cardiac monitor, continuous pulse ox, and BP cuff.  RR even and unlabored.   NAD noted.   Whiteboard updated.  Will continue with plan of care.

## 2024-01-14 NOTE — CONSULTS
Department of Endovascular Neurosurgery                                                                                                                      Resident Consult Note  Stroke Alert paged @ 6:15 PM on 1/14/2024  ER Room # 18  Arrival to patient bedside @ 6:17 PM        Reason for Consult: Aphasia and right-sided weakness  Requesting Physician:    Endovascular Neurosurgeon:   [x]Dr. Tripp  []Dr. Estrella  []Dr. Archer   []    History Obtained From:  patient, electronic medical record    CHIEF COMPLAINT:       Aphasia and right-sided weakness    HISTORY OF PRESENT ILLNESS:       The patient is a 53 y.o. male with past medical history of hypertension on lisinopril, smoking presents to [facility] as a stroke alert.  Patient went directly to CT scan.  Symptoms started 20 minutes prior to arrival, and consisted of right upper extremity and lower extremity weakness, aphasia, dysarthria, and right-sided facial droop.  As per the EMS, he was found to be hypertensive with systolic 214, and was found to have systolic pressure of 204 while in the CT scan room.  On exam, patient had severe aphasia and dysarthria, right upper extremity drift, right facial droop, and sensory deficit.  He was able to understand questions, and follow commands.     Initial NIHSS 13 for severe aphasia, severe dysarthria, right upper extremity drift, right lower facial droop, and sensory deficit.    Last know well: 6:00 PM on 1/14/2024    On presentation:   BP: 204/108  BSL: Glucose 91    Prior to arrival patient was on  Antiplatelets/anticoagulants: None  Statins: None    Smoking history: smoker         PAST MEDICAL HISTORY :       Past Medical History:        Diagnosis Date    Hypertension        Past Surgical History:        Procedure Laterality Date    FINGER SURGERY         Social History:   Social History     Socioeconomic History    Marital

## 2024-01-15 ENCOUNTER — APPOINTMENT (OUTPATIENT)
Dept: CT IMAGING | Age: 54
End: 2024-01-15
Payer: COMMERCIAL

## 2024-01-15 PROBLEM — I62.9 INTRACRANIAL HEMORRHAGE (HCC): Status: ACTIVE | Noted: 2024-01-15

## 2024-01-15 LAB
AMPHET UR QL SCN: NEGATIVE
ANION GAP SERPL CALCULATED.3IONS-SCNC: 11 MMOL/L (ref 9–17)
BARBITURATES UR QL SCN: NEGATIVE
BENZODIAZ UR QL: NEGATIVE
BUN SERPL-MCNC: 10 MG/DL (ref 6–20)
CALCIUM SERPL-MCNC: 8.8 MG/DL (ref 8.6–10.4)
CANNABINOIDS UR QL SCN: NEGATIVE
CHLORIDE SERPL-SCNC: 96 MMOL/L (ref 98–107)
CHOLEST SERPL-MCNC: 162 MG/DL
CHOLESTEROL/HDL RATIO: 2.3
CO2 SERPL-SCNC: 22 MMOL/L (ref 20–31)
COCAINE UR QL SCN: POSITIVE
CREAT SERPL-MCNC: 0.6 MG/DL (ref 0.7–1.2)
ERYTHROCYTE [DISTWIDTH] IN BLOOD BY AUTOMATED COUNT: 11.9 % (ref 11.8–14.4)
EST. AVERAGE GLUCOSE BLD GHB EST-MCNC: 111 MG/DL
FENTANYL UR QL: NEGATIVE
GFR SERPL CREATININE-BSD FRML MDRD: >60 ML/MIN/1.73M2
GLUCOSE SERPL-MCNC: 123 MG/DL (ref 70–99)
HBA1C MFR BLD: 5.5 % (ref 4–6)
HCT VFR BLD AUTO: 40.3 % (ref 40.7–50.3)
HDLC SERPL-MCNC: 71 MG/DL
HGB BLD-MCNC: 14.3 G/DL (ref 13–17)
LDLC SERPL CALC-MCNC: 77 MG/DL (ref 0–130)
MCH RBC QN AUTO: 33.6 PG (ref 25.2–33.5)
MCHC RBC AUTO-ENTMCNC: 35.5 G/DL (ref 28.4–34.8)
MCV RBC AUTO: 94.6 FL (ref 82.6–102.9)
METHADONE UR QL: POSITIVE
NRBC BLD-RTO: 0 PER 100 WBC
OPIATES UR QL SCN: NEGATIVE
OXYCODONE UR QL SCN: NEGATIVE
PCP UR QL SCN: NEGATIVE
PLATELET # BLD AUTO: 298 K/UL (ref 138–453)
PMV BLD AUTO: 8.8 FL (ref 8.1–13.5)
POTASSIUM SERPL-SCNC: 3.8 MMOL/L (ref 3.7–5.3)
RBC # BLD AUTO: 4.26 M/UL (ref 4.21–5.77)
SODIUM SERPL-SCNC: 129 MMOL/L (ref 135–144)
TEST INFORMATION: ABNORMAL
TRIGL SERPL-MCNC: 71 MG/DL
TSH SERPL DL<=0.05 MIU/L-ACNC: 1.47 UIU/ML (ref 0.3–5)
WBC OTHER # BLD: 9.7 K/UL (ref 3.5–11.3)

## 2024-01-15 PROCEDURE — 80307 DRUG TEST PRSMV CHEM ANLYZR: CPT

## 2024-01-15 PROCEDURE — 6360000002 HC RX W HCPCS: Performed by: NURSE PRACTITIONER

## 2024-01-15 PROCEDURE — 6360000002 HC RX W HCPCS

## 2024-01-15 PROCEDURE — 36620 INSERTION CATHETER ARTERY: CPT

## 2024-01-15 PROCEDURE — 6370000000 HC RX 637 (ALT 250 FOR IP): Performed by: NURSE PRACTITIONER

## 2024-01-15 PROCEDURE — 2000000000 HC ICU R&B

## 2024-01-15 PROCEDURE — 92523 SPEECH SOUND LANG COMPREHEN: CPT

## 2024-01-15 PROCEDURE — 70450 CT HEAD/BRAIN W/O DYE: CPT

## 2024-01-15 PROCEDURE — 92610 EVALUATE SWALLOWING FUNCTION: CPT

## 2024-01-15 PROCEDURE — 85027 COMPLETE CBC AUTOMATED: CPT

## 2024-01-15 PROCEDURE — 84443 ASSAY THYROID STIM HORMONE: CPT

## 2024-01-15 PROCEDURE — 2580000003 HC RX 258

## 2024-01-15 PROCEDURE — 2580000003 HC RX 258: Performed by: NURSE PRACTITIONER

## 2024-01-15 PROCEDURE — 83036 HEMOGLOBIN GLYCOSYLATED A1C: CPT

## 2024-01-15 PROCEDURE — 99291 CRITICAL CARE FIRST HOUR: CPT | Performed by: STUDENT IN AN ORGANIZED HEALTH CARE EDUCATION/TRAINING PROGRAM

## 2024-01-15 PROCEDURE — 80061 LIPID PANEL: CPT

## 2024-01-15 PROCEDURE — 80048 BASIC METABOLIC PNL TOTAL CA: CPT

## 2024-01-15 PROCEDURE — 2500000003 HC RX 250 WO HCPCS: Performed by: NURSE PRACTITIONER

## 2024-01-15 RX ORDER — LISINOPRIL 20 MG/1
10 TABLET ORAL DAILY
Status: DISCONTINUED | OUTPATIENT
Start: 2024-01-15 | End: 2024-01-15

## 2024-01-15 RX ORDER — LABETALOL HYDROCHLORIDE 5 MG/ML
10 INJECTION, SOLUTION INTRAVENOUS EVERY 4 HOURS PRN
Status: DISCONTINUED | OUTPATIENT
Start: 2024-01-15 | End: 2024-01-15

## 2024-01-15 RX ORDER — AMLODIPINE BESYLATE 10 MG/1
10 TABLET ORAL DAILY
COMMUNITY

## 2024-01-15 RX ORDER — HYDROCHLOROTHIAZIDE 12.5 MG/1
12.5 CAPSULE, GELATIN COATED ORAL DAILY
COMMUNITY

## 2024-01-15 RX ORDER — LISINOPRIL 10 MG/1
5 TABLET ORAL DAILY
Status: DISCONTINUED | OUTPATIENT
Start: 2024-01-15 | End: 2024-01-15

## 2024-01-15 RX ORDER — LORAZEPAM 2 MG/ML
1 INJECTION INTRAMUSCULAR ONCE
Status: COMPLETED | OUTPATIENT
Start: 2024-01-15 | End: 2024-01-15

## 2024-01-15 RX ORDER — LORAZEPAM 2 MG/ML
1 INJECTION INTRAMUSCULAR
Status: COMPLETED | OUTPATIENT
Start: 2024-01-15 | End: 2024-01-16

## 2024-01-15 RX ORDER — LISINOPRIL 20 MG/1
10 TABLET ORAL ONCE
Status: COMPLETED | OUTPATIENT
Start: 2024-01-15 | End: 2024-01-15

## 2024-01-15 RX ORDER — LISINOPRIL 20 MG/1
20 TABLET ORAL DAILY
Status: DISCONTINUED | OUTPATIENT
Start: 2024-01-16 | End: 2024-01-16

## 2024-01-15 RX ORDER — LABETALOL HYDROCHLORIDE 5 MG/ML
10 INJECTION, SOLUTION INTRAVENOUS EVERY 4 HOURS PRN
Status: DISCONTINUED | OUTPATIENT
Start: 2024-01-15 | End: 2024-01-16

## 2024-01-15 RX ADMIN — SODIUM CHLORIDE, PRESERVATIVE FREE 10 ML: 5 INJECTION INTRAVENOUS at 20:20

## 2024-01-15 RX ADMIN — SODIUM CHLORIDE 5 MG/HR: 9 INJECTION, SOLUTION INTRAVENOUS at 15:18

## 2024-01-15 RX ADMIN — NICARDIPINE HYDROCHLORIDE 15 MG/HR: 0.1 INJECTION INTRAVENOUS at 02:18

## 2024-01-15 RX ADMIN — NICARDIPINE HYDROCHLORIDE 15 MG/HR: 0.1 INJECTION INTRAVENOUS at 06:38

## 2024-01-15 RX ADMIN — SODIUM CHLORIDE 7.5 MG/HR: 9 INJECTION, SOLUTION INTRAVENOUS at 20:21

## 2024-01-15 RX ADMIN — LORAZEPAM 1 MG: 2 INJECTION INTRAMUSCULAR; INTRAVENOUS at 16:31

## 2024-01-15 RX ADMIN — SODIUM CHLORIDE, PRESERVATIVE FREE 10 ML: 5 INJECTION INTRAVENOUS at 07:53

## 2024-01-15 RX ADMIN — SODIUM CHLORIDE 2.5 MG/HR: 9 INJECTION, SOLUTION INTRAVENOUS at 10:03

## 2024-01-15 RX ADMIN — NICARDIPINE HYDROCHLORIDE 15 MG/HR: 0.1 INJECTION INTRAVENOUS at 07:51

## 2024-01-15 RX ADMIN — NICARDIPINE HYDROCHLORIDE 15 MG/HR: 0.1 INJECTION INTRAVENOUS at 05:15

## 2024-01-15 RX ADMIN — LISINOPRIL 10 MG: 20 TABLET ORAL at 08:57

## 2024-01-15 RX ADMIN — NICARDIPINE HYDROCHLORIDE 15 MG/HR: 0.1 INJECTION INTRAVENOUS at 00:49

## 2024-01-15 RX ADMIN — LISINOPRIL 10 MG: 20 TABLET ORAL at 10:59

## 2024-01-15 RX ADMIN — THIAMINE HYDROCHLORIDE: 100 INJECTION, SOLUTION INTRAMUSCULAR; INTRAVENOUS at 10:05

## 2024-01-15 RX ADMIN — SODIUM CHLORIDE 2.5 MG/HR: 9 INJECTION, SOLUTION INTRAVENOUS at 11:01

## 2024-01-15 RX ADMIN — NICARDIPINE HYDROCHLORIDE 15 MG/HR: 0.1 INJECTION INTRAVENOUS at 03:48

## 2024-01-15 RX ADMIN — LABETALOL HYDROCHLORIDE 10 MG: 5 INJECTION, SOLUTION INTRAVENOUS at 05:57

## 2024-01-15 NOTE — PROGRESS NOTES
Patient agitated/restless trying to get out of bed with girlfriend at bedside. RN notified Gisel CERRATO. New orders received.

## 2024-01-15 NOTE — ED NOTES
Report received from Beatrice MONTEMAYOR.     Pt alert, remains aphasic, unable to make out sentences.   Pt laying on cot with full cardiac monitor on, call light in reach.   Preparing for art line placement at bedside.     Pt on cardene at 10 mg/hr upon arrival.

## 2024-01-15 NOTE — CONSULTS
ALT 23 02/01/2020    AST 24 02/01/2020    TSH 1.35 05/11/2020    INR 0.9 01/14/2024           Imaging/Diagnostics:         CT HEAD WO CONTRAST     Result Date: 1/14/2024  1. Intraparenchymal hemorrhage in the left basal ganglia region with mild adjacent edema and mass effect. 2. Severe stenosis at the origin of the right vertebral artery. 3. Occlusion at the origin of the left vertebral artery with reconstitution in the mid V2 aspect.  Subsequent moderate stenosis in the distal V2 portion of the left vertebral artery. 4. Moderate stenosis in the proximal left anterior cerebral artery. Findings were discussed with Juan M Quiroz at 6:54 pm on 1/14/2024.      CTA HEAD NECK W CONTRAST     Result Date: 1/14/2024  1. Intraparenchymal hemorrhage in the left basal ganglia region with mild adjacent edema and mass effect. 2. Severe stenosis at the origin of the right vertebral artery. 3. Occlusion at the origin of the left vertebral artery with reconstitution in the mid V2 aspect.  Subsequent moderate stenosis in the distal V2 portion of the left vertebral artery. 4. Moderate stenosis in the proximal left anterior cerebral artery. Findings were discussed with Juan M Quiroz at 6:54 pm on 1/14/2024.      Impression:      Left basal ganglia IPH likely hypertensive etiology                 53 y.o. male with past medical history of hypertension on lisinopril presents for left basal ganglia IPH.  Patient has a NIH SS of 13 as noted above.  On exam, he has severe dysarthria, aphasia, right upper extremity weakness, sensory deficit, and right lower facial droop.  Patient was started on Cardene drip, and transferred to neuro ICU for close monitoring.  Neurosurgery was contacted, and did not recommend surgical intervention at this time.  Plan to repeat CT head in 6 hours to evaluate progression of bleed.     CTA showed severe stenosis at the origin of right vertebral artery, occlusion at the origin of the left vertebral artery with

## 2024-01-15 NOTE — CONSULTS
[facility] [place]    Department of Neurosurgery  Resident Consult Note      Reason for Consult:  Left-sided IPH, basal ganglia  Requesting Physician:  Dr. Quiroz  Neurosurgeon:   []Dr. Strong  []Dr. Hernandez  []Dr. Mariee  []Dr. Patel  []Dr. Douglas  []Dr. Weems    History Obtained From:  patient, EMR    CHIEF COMPLAINT:         Right-sided weakness    HISTORY OF PRESENT ILLNESS:       53-year-old male with past medical history HTN, DDD, smoking who presents to the ED as a race stroke alert.  Last known well was approximately 1800 earlier today.  He presented with right upper and right lower extremity weakness, aphasia, dysarthria, right-sided facial droop.  He was found to be hypertensive ( on arrival).  NIH 13 for aphasia, right sided drift, facial droop, sensory loss. EMS reported no use of blood thinners.  The patient has incomprehensible speech, global aphasia, for this reason history is limited.  Imaging significant for IPH left basal ganglia.  Patient started on Cardene drip for SBP goal < 140.  Patient admitted to neuro ICU for further care.     CT Head   1. Intraparenchymal hemorrhage in the left basal ganglia region with mild  adjacent edema and mass effect.     CTA head and neck:   2. Severe stenosis at the origin of the right vertebral artery.  3. Occlusion at the origin of the left vertebral artery with reconstitution  in the mid V2 aspect.  Subsequent moderate stenosis in the distal V2 portion  of the left vertebral artery.  4. Moderate stenosis in the proximal left anterior cerebral artery.     ICH score: 0     Admitted to ICU From: Ed   Reason for ICU Admission: IPH left BG    PAST MEDICAL HISTORY :       Past Medical History:        Diagnosis Date    Hypertension        Past Surgical History:        Procedure Laterality Date    FINGER SURGERY         Social History:   Social History     Socioeconomic History    Marital status:      Spouse name: Not on file

## 2024-01-15 NOTE — PROGRESS NOTES
responded to family request for a visit from a . Pt was asleep and visitor present introduced herself as pt's girlfriend, Esther. She inquired about whether a  would visit and  assured her that our visiting  would be in to see pt today.  inquired about Communion and she said pt would not be interested in that. She said she thinks pt would like to receive anointing of the sick.    Chaplains will remain available to offer spiritual and emotional support as needed.     Electronically signed by Chaplain Car, on 1/15/2024 at 10:01 AM.  [facility]  [facility]  [phone]

## 2024-01-15 NOTE — ED NOTES
Pt found trying to get out of bed. Pt reoriented and re educated on importance of staying in bed. Pt repositioned, lights turned down, and warm blanket provided. Elimination offered, pt declined. Call light placed at bedside.

## 2024-01-15 NOTE — PROGRESS NOTES
[facility] [place]  [place]    Shift date: 1.14.2024  Shift day: Sunday   Shift # 2    Room # KAREN/KAREN   Name: Jagdeep Quintana Sr.                Buddhism: Roman Catholic   Place of Restoration: unknown    Referral: Nurse    Admit Date & Time: 1/14/2024  6:26 PM    Assessment:  Jagdeep Quintana Sr. is a 53 y.o. male in the hospital because of stroke-like symptoms. Upon entering the room writer observes the significant other standing in the hallway and had requested to speak to a .  Significant other is requesting a  to visit the patient everyday.   explained that we may not be able to have a  come everyday but could possibly have a  visit.  In addition, a request can be made to have a  visit the patient to provide support when they are in the hospital.        Intervention:  Writer introduced self and title as  Writer offered space for the significant other  to express feelings, needs, and concerns and provided a ministry presence.     Outcome:  Significant other expressed gratitude.  Appears to be calm and coping at this time.      Plan:  Chaplains will remain available to offer spiritual and emotional support as needed.      Electronically signed by Chaplain Kuldeep, on 1/15/2024 at 12:10 AM.  [facility]  [phone]       01/14/24 2330   Encounter Summary   Encounter Overview/Reason  Follow-up   Service Provided For: Significant other   Referral/Consult From: Nurse   Support System Children;Family members;Significant other   Last Encounter  01/14/24   Complexity of Encounter Moderate   Begin Time 2330   End Time  2345   Total Time Calculated 15 min   Spiritual/Emotional needs   Type Spiritual Support   Assessment/Intervention/Outcome   Assessment Coping   Intervention Active listening;Discussed belief system/Congregation practices/rupesh   Outcome Expressed feelings, needs, and concerns     Electronically signed by Jose Luis VELOZ

## 2024-01-15 NOTE — ED NOTES
Pt alert, remains aphasic, nad.   Pt placed on n2L NC for o2 sat at 89%.   Pt asking for cigarette and educated.  Pt laying on cot with full cardiac monitor on, call light in reach.

## 2024-01-15 NOTE — PROGRESS NOTES
[facility]  Occupational Therapy Not Seen Note    DATE: 1/15/2024    NAME: Jagdeep Quintana Sr.  MRN: [id number]   : 1970      Patient not seen this date for Occupational Therapy due to:    Strict Bedrest:Pt under strict bedrest orders at this time. OT will continue to follow and assess when able.     Next Scheduled Treatment:       Electronically signed by DUSTY Bañuelos on 1/15/2024 at 8:15 AM

## 2024-01-15 NOTE — PROGRESS NOTES
SLP ALL NOTES  Facility/Department: [address] NEURO ICU   CLINICAL BEDSIDE SWALLOW EVALUATION    NAME: Jagdeep WOOD Mariano Martell  : 1970  MRN: [id number]    ADMISSION DATE: 2024  ADMITTING DIAGNOSIS: has Essential hypertension; Chronic shoulder pain; Acute low back pain; Cervical pain; Neck sprain; Lumbar sprain; DDD (degenerative disc disease), cervical; DDD (degenerative disc disease), lumbar; Smoker; Acquired spondylolisthesis; Intraparenchymal hemorrhage of brain (HCC); and Intracranial hemorrhage (HCC) on their problem list.      Date of Eval: 1/15/2024  Evaluating Therapist: ELFEGO Rosen    Current Diet level:  Current Diet : NPO  Current Liquid Diet : NPO    Primary Complaint: 53-year-old male with past medical history HTN, DDD, smoking who presents to the ED as a race stroke alert.  Last known well was approximately 1800 earlier today.  He presented with right upper and right lower extremity weakness, aphasia, dysarthria, right-sided facial droop.  He was found to be hypertensive ( on arrival).  NIH 13 for aphasia, right sided drift, facial droop, sensory loss. EMS reported no use of blood thinners.  The patient has incomprehensible speech, global aphasia, for this reason history is limited.  Imaging significant for IPH left basal ganglia.  Patient started on Cardene drip for SBP goal < 140.  Patient admitted to neuro ICU for further care.        Pain:  Pain Assessment  Pain Assessment: None - Denies Pain    Reason for Referral  Jagdeep NINA Mariano Martell was referred for a bedside swallow evaluation to assess the efficiency of his swallow function, identify signs and symptoms of aspiration and make recommendations regarding safe dietary consistencies, effective compensatory strategies, and safe eating environment.    Impression: Patient presents with probable safe swallow for Regular diet with thin liquids as evidenced by no overt s/s of aspiration noted with consistencies tested.  Mild oral  stasis noted on Right. Recommend small sips and bites, only feed when alert and awake and upright at 90 degrees for all PO intake.  Recommend close monitoring for overt/clinical s/s of aspiration and D/C PO intake and complete Modified Barium Swallow Study should they occur.  Results and recommendations reported to RN.   Dysphagia Diagnosis: Mild oral stage dysphagia  Dysphagia Outcome Severity Scale: Level 6: Within functional limits/Modified independence     Treatment Plan  Requires SLP Intervention: Yes   3-5X/week  D/C Recommendations: Ongoing speech therapy is recommended at next level of care;Ongoing speech therapy is recommended during this hospitalization   Further therapy recommended at discharge.     Recommended Diet and Intervention  Diet Solids Recommendation: Regular  Liquid Consistency Recommendation: Thin  Recommended Form of Meds: PO     Therapeutic Interventions: Diet tolerance monitoring;Oral motor exercises    Compensatory Swallowing Strategies  Compensatory Swallowing Strategies : Small bites/sips;Eat/Feed slowly;Upright as possible for all oral intake    Treatment/Goals  Dysphagia Goals: The patient will tolerate recommended diet without observed clinical signs of aspiration    General  Chart Reviewed: Yes  Behavior/Cognition: Alert;Cooperative  Respiratory Status: Room air  O2 Device: None (Room air)  Communication Observation: Aphasia  Follows Directions: Simple  Dentition: Adequate  Patient Positioning: Upright in bed  Baseline Vocal Quality: Normal    Oral Motor Deficits  Labial: Right droop  Lingual: Right deviation    Oral Phase Dysfunction  Oral Phase  Oral Phase: Exceptions Adequate mastication and oral manipulation for consistencies tested     Indicators of Pharyngeal Phase Dysfunction   Pharyngeal Phase  Pharyngeal Phase: WFL  Pharyngeal Phase   Pharyngeal Phase: WFL No overt s/s of aspiration noted with consistencies tested    Prognosis  Prognosis: Fair  Consulted and agree with

## 2024-01-15 NOTE — PROGRESS NOTES
ED resident Dr. Quiroz attempted to place arterial line in patient, however patient pulled out line.     Ed resident Dr. Betancourt attempted to place arterial line, however did not succeed.     RT notified and consult placed for arterial line placement.

## 2024-01-15 NOTE — PROGRESS NOTES
01/14/24 1902   Encounter Summary   Encounter Overview/Reason  Crisis;Initial Encounter   Service Provided For: Family;Patient   Referral/Consult From: Multi-disciplinary team   Support System Family members   Last Encounter  01/14/24   Complexity of Encounter High   Begin Time 1830   End Time  1845   Total Time Calculated 15 min   Crisis   Type Code Stroke   Assessment/Intervention/Outcome   Assessment Coping;Calm   Intervention Active listening;Sustaining Presence/Ministry of presence   Outcome Acceptance;Coping;Engaged in conversation     [facility]  PROGRESS NOTE    Shift date: 1/14/2024  Shift day: Sunday   Shift # 2    Room # 18/18   Name: Jagdeep Quintana Sr.                Quaker: unkown   Place of Episcopalian: [email or web address]    Referral:  Stoke Alert    Admit Date & Time: 1/14/2024  6:26 PM    Assessment:  Jagdeep Quintana Sr. is a 53 y.o. male in the hospital because patient was having trouble speaking and moving right arm. Upon entering the room writer observes multidisciplinary team with patient.      Intervention:  Writer introduced self and title as  Writer offered space for family  to express feelings, needs, and concerns and provided a ministry presence.     Outcome:   was present when medical staff spoke with patient's girlfriend and girlfriend's mother. Girlfriend is contacting familly. engaged in conversation with family. Family appeared to be coping and accepting of support.Girlfriend requested to see the patient.    Plan:  Chaplains will follow-up with family.      Electronically signed by Rosita Gamez, Intern, on 1/14/2024 at 7:06 PM.  [facility]  [phone]

## 2024-01-15 NOTE — H&P
Neuro ICU History & Physical    Patient Name: Jagdeep Quintana Sr.  Patient : 1970  Room/Bed:   Code Status: Full Code  Allergies: No Known Allergies    CHIEF COMPLAINT     Right-sided weakness    HPI    History Obtained From: EMR, patient    53-year-old male with past medical history HTN, DDD, smoking who presents to the ED as a race stroke alert.  Last known well was approximately 1800 earlier today.  He presented with right upper and right lower extremity weakness, aphasia, dysarthria, right-sided facial droop.  He was found to be hypertensive ( on arrival).  NIH 13 for aphasia, right sided drift, facial droop, sensory loss. EMS reported no use of blood thinners.  The patient has incomprehensible speech, global aphasia, for this reason history is limited.  Imaging significant for IPH left basal ganglia.  Patient started on Cardene drip for SBP goal < 140.  Patient admitted to neuro ICU for further care.    CT Head   1. Intraparenchymal hemorrhage in the left basal ganglia region with mild  adjacent edema and mass effect.    CTA head and neck:   2. Severe stenosis at the origin of the right vertebral artery.  3. Occlusion at the origin of the left vertebral artery with reconstitution  in the mid V2 aspect.  Subsequent moderate stenosis in the distal V2 portion  of the left vertebral artery.  4. Moderate stenosis in the proximal left anterior cerebral artery.    ICH score: 0    Admitted to ICU From: Ed   Reason for ICU Admission: IPH left BG       PATIENT HISTORY   Past Medical History:        Diagnosis Date    Hypertension        Past Surgical History:        Procedure Laterality Date    FINGER SURGERY         Social History:   Social History     Socioeconomic History    Marital status:      Spouse name: Not on file    Number of children: Not on file    Years of education: Not on file    Highest education level: Not on file   Occupational History    Occupation:      Comment:  PROVIDED HISTORY: stroke TECHNOLOGIST PROVIDED HISTORY: stroke Decision Support Exception - unselect if not a suspected or confirmed emergency medical condition->Emergency Medical Condition (MA) Reason for Exam: stroke FINDINGS: CT HEAD: BRAIN/VENTRICLES: There is an intraparenchymal hemorrhage in the left basal ganglia region measuring approximately 5.2 x 2.0 cm. There is mild adjacent edema with mass effect upon the left lateral ventricle. There is no midline shift.  The infratentorial structures are unremarkable. ORBITS: The visualized portion of the orbits demonstrate no acute abnormality. SINUSES:  The visualized paranasal sinuses and mastoid air cells demonstrate no acute abnormality. SOFT TISSUES/SKULL: No acute abnormality of the visualized skull or soft tissues. CTA NECK: AORTIC ARCH/ARCH VESSELS: No dissection or arterial injury.  No significant stenosis of the brachiocephalic or subclavian arteries. CAROTID ARTERIES: There is atheromatous plaquing common carotid arteries bilaterally without significant stenosis.  There is calcification of the carotid bulbs and proximal internal carotid arteries bilaterally without significant stenosis.  The cervical internal carotid arteries are patent. VERTEBRAL ARTERIES: There is severe stenosis at the origin of the right vertebral artery.  The remainder of right vertebral artery is patent.  There is occlusion at the origin of the left vertebral artery.  There is reconstitution within the mid V2 aspect of the left vertebral artery.  There is moderate stenosis in the distal V2 portion of the left vertebral artery. SOFT TISSUES: The lung apices are clear.  No cervical or superior mediastinal lymphadenopathy.  The larynx and pharynx are unremarkable.  No acute abnormality of the salivary and thyroid glands. BONES: No acute osseous abnormality. CTA HEAD: ANTERIOR CIRCULATION: The internal carotid arteries are patent.  There is moderate stenosis the proximal left anterior

## 2024-01-15 NOTE — PLAN OF CARE
--  NO ACUTE NEUROSURGICAL INTERVENTION AT THIS TIME    NEUROSURGERY TO SIGN OFF     Please contact Neurosurgery with any questions.    PATIENT TO FOLLOW UP IN CLINIC IN THREE MONTHS WITH CT HEAD PRIOR TO APPOINTMENT:    ---  Follow-up with Neurosurgery  [facility]  [facility] (Medical Office [facility])  [address]  Call [phone] for an appointment.  --  Electronically signed by JACOB Guajardo CNP on 1/15/2024 at 3:49 PM

## 2024-01-15 NOTE — PROGRESS NOTES
[facility] [place]    Shift date: 1/14/2024  Shift day: Sunday   Shift # 2    Room # KAREN/KAREN   Name: Jagdeep Quintana Sr.                Mormonism: Unknown   Place of Rastafari: Unknown    Referral:  Nurse    Admit Date & Time: 1/14/2024  6:26 PM    Assessment:  Jagdeep Quintana Sr. is a 53 y.o. male in the hospital because stroke like symptoms. Upon entering the room writer observes family anxious; interrupted family processes.      Intervention:  Writer introduced self and title as  Writer offered space for family  to express feelings, needs, and concerns and provided a ministry presence.   provided active listening;sustaining presence/ministry of presence    Outcome:  Patient engaged in conversation.    Plan:  Chaplains will remain available to offer spiritual and emotional support as needed.      Electronically signed by Rosita Gamez, Intern, on 1/15/2024 at 12:01 AM.  [facility]  [phone]      01/14/24 2354   Encounter Summary   Encounter Overview/Reason  Crisis   Service Provided For: Patient;Family   Referral/Consult From: Multi-disciplinary team   Support System Family members   Last Encounter  01/14/24   Complexity of Encounter Moderate   Begin Time 2200   End Time  2245   Total Time Calculated 45 min   Crisis   Type Code Stroke   Assessment/Intervention/Outcome   Assessment Anxious;Interrupted family processes   Intervention Active listening;Sustaining Presence/Ministry of presence   Outcome Engaged in conversation

## 2024-01-15 NOTE — PROGRESS NOTES
Insert Arterial Line  Date/Time:  01/15/24, 3:55 AM  Performed by: Joleen Kiran RCP    Patient identity confirmed: arm band and provided demographic data   Time out: Immediately prior to procedure a \"time out\" was called to verify the correct patient, procedure, equipment, support staff.    Preparation: Patient was prepped and draped in the usual sterile fashion.    Location:left radial    [place]'s test normal: yes  Needle gauge: 20     Number of attempts: 1  Post-procedure: transparent dressing applied and line secured    Patient tolerance: well

## 2024-01-15 NOTE — PROGRESS NOTES
SLP ALL NOTES  Facility/Department: [address] NEURO ICU  Initial Speech/Language/Cognitive Assessment    NAME: Jagdeep Quintana Sr.  : 1970   MRN: [id number]  ADMISSION DATE: 2024  ADMITTING DIAGNOSIS: has Essential hypertension; Chronic shoulder pain; Acute low back pain; Cervical pain; Neck sprain; Lumbar sprain; DDD (degenerative disc disease), cervical; DDD (degenerative disc disease), lumbar; Smoker; Acquired spondylolisthesis; Intraparenchymal hemorrhage of brain (HCC); and Intracranial hemorrhage (HCC) on their problem list.      Date of Eval: 1/15/2024   Evaluating Therapist: ELFEGO Rosen    RECENT RESULTS  CT OF HEAD/MRI:     Primary Complaint: 53-year-old male with past medical history HTN, DDD, smoking who presents to the ED as a race stroke alert.  Last known well was approximately 1800 earlier today.  He presented with right upper and right lower extremity weakness, aphasia, dysarthria, right-sided facial droop.  He was found to be hypertensive ( on arrival).  NIH 13 for aphasia, right sided drift, facial droop, sensory loss. EMS reported no use of blood thinners.  The patient has incomprehensible speech, global aphasia, for this reason history is limited.  Imaging significant for IPH left basal ganglia.  Patient started on Cardene drip for SBP goal < 140.  Patient admitted to neuro ICU for further care.     Pain:  Pain Assessment  Pain Assessment: None - Denies Pain         Assessment:IMPRESSION:  1. Intraparenchymal hemorrhage in the left basal ganglia region with mild  adjacent edema and mass effect.  2. Severe stenosis at the origin of the right vertebral artery.  3. Occlusion at the origin of the left vertebral artery with reconstitution  in the mid V2 aspect.  Subsequent moderate stenosis in the distal V2 portion  of the left vertebral artery.  4. Moderate stenosis in the proximal left anterior cerebral artery.  Findings were discussed with Juan M Quiroz at 6:54 pm on  situation  Attention  Attention: Exceptions to WFL  Sustained Attention: Mild      Prognosis:  Speech Therapy Prognosis  Prognosis: Fair  Individuals consulted  Consulted and agree with results and recommendations: Patient;Family member    Education:  Patient Education: yes  Patient Education Response: Verbalizes understanding          Therapy Time:   Individual Concurrent Group Co-treatment   Time In  1050         Time Out  1100         Minutes  10                 Electronically signed by ELFEGO Rosen on 1/15/2024 at 2:37 PM

## 2024-01-15 NOTE — PROGRESS NOTES
[facility] - [facility] [place]     Emergency/Trauma Note    PATIENT NAME: Jagdeep Quintana Sr.    Shift date: 1.14.2024  Shift day: Sunday   Shift # 2    Room # 18/18   Name: Jagdeep Quintana Sr.            Age: 53 y.o.  Gender: male          Confucianist: None   Place of Bahai: unknown    Trauma/Incident type: Stroke Alert  Admit Date & Time: 1/14/2024  6:26 PM  TRAUMA NAME: None    ADVANCE DIRECTIVES IN CHART?  No    NAME OF DECISION MAKER: None    RELATIONSHIP OF DECISION MAKER TO PATIENT: None    PATIENT/EVENT DESCRIPTION:  Jagdeep Quintana Sr. is a 53 y.o. male who arrived as a STROKE ALERT and taken directly to CT. Pt to be admitted to 18/18.         SPIRITUAL ASSESSMENT-INTERVENTION-OUTCOME:   was unable to assess patient.  Per EMTs, patient came from home and family was there.  States that family are on their way to the hospital.   obtained name and date of birth of the patient.  Confirmed family support is available.     PATIENT BELONGINGS:  No belongings noted    ANY BELONGINGS OF SIGNIFICANT VALUE NOTED:  None    REGISTRATION STAFF NOTIFIED?  Yes      WHAT IS YOUR SPIRITUAL CARE PLAN FOR THIS PATIENT?:  Chaplains will remain available to offer spiritual and emotional support as needed.      Electronically signed by Chaplain Kuldeep, on 1/14/2024 at 8:02 PM.  [facility]  [phone]     01/14/24 1830   Encounter Summary   Encounter Overview/Reason  Crisis   Service Provided For: Patient   Referral/Consult From: Multi-disciplinary team   Support System Significant other;Family members   Last Encounter  01/14/24   Complexity of Encounter Low   Begin Time 1830   End Time  1840   Total Time Calculated 10 min   Crisis   Type Code Stroke   Assessment/Intervention/Outcome   Assessment Unable to assess     Electronically signed by Jose Luis Coffman on 1/14/2024 at 8:02 PM

## 2024-01-15 NOTE — PROGRESS NOTES
Physical Therapy Cancel Note      DATE: 1/15/2024    NAME: Jagdeep Quintana .  MRN: [id number]   : 1970      Patient not seen this date for Physical Therapy due to:    Other: RN requested PT to come back later d/t pt having issues with BP/hypertensive; check back as time allows.        Electronically signed by Chepe Cross PT on 1/15/2024 at 8:10 AM

## 2024-01-15 NOTE — ED NOTES
Pt trying to get out of bed and pulling self off of monitor.   Pt redirected and updated on plan of care.   Pt educated on importance of cooperation of care and nods in understanding.    Pt remains laying on cot with full cardiac monitor on, call light in reach, urinal placed at bedside for elimination needs.

## 2024-01-16 ENCOUNTER — APPOINTMENT (OUTPATIENT)
Age: 54
End: 2024-01-16
Payer: COMMERCIAL

## 2024-01-16 ENCOUNTER — APPOINTMENT (OUTPATIENT)
Dept: GENERAL RADIOLOGY | Age: 54
End: 2024-01-16
Payer: COMMERCIAL

## 2024-01-16 ENCOUNTER — APPOINTMENT (OUTPATIENT)
Dept: CT IMAGING | Age: 54
End: 2024-01-16
Payer: COMMERCIAL

## 2024-01-16 PROBLEM — E87.1 HYPONATREMIA: Status: ACTIVE | Noted: 2024-01-16

## 2024-01-16 LAB
ANION GAP SERPL CALCULATED.3IONS-SCNC: 12 MMOL/L (ref 9–17)
BASOPHILS # BLD: 0.06 K/UL (ref 0–0.2)
BASOPHILS NFR BLD: 1 % (ref 0–2)
BUN SERPL-MCNC: 7 MG/DL (ref 6–20)
CALCIUM SERPL-MCNC: 8.6 MG/DL (ref 8.6–10.4)
CHLORIDE SERPL-SCNC: 98 MMOL/L (ref 98–107)
CO2 SERPL-SCNC: 15 MMOL/L (ref 20–31)
CREAT SERPL-MCNC: 0.5 MG/DL (ref 0.7–1.2)
ECHO AO ROOT DIAM: 3.2 CM
ECHO AO ROOT INDEX: 1.63 CM/M2
ECHO AV AREA PEAK VELOCITY: 2.6 CM2
ECHO AV AREA VTI: 3.4 CM2
ECHO AV AREA/BSA PEAK VELOCITY: 1.3 CM2/M2
ECHO AV AREA/BSA VTI: 1.7 CM2/M2
ECHO AV MEAN GRADIENT: 5 MMHG
ECHO AV MEAN VELOCITY: 1.1 M/S
ECHO AV PEAK GRADIENT: 9 MMHG
ECHO AV PEAK VELOCITY: 1.5 M/S
ECHO AV VELOCITY RATIO: 0.73
ECHO AV VTI: 29.4 CM
ECHO BSA: 1.96 M2
ECHO LA AREA 2C: 19.2 CM2
ECHO LA AREA 4C: 16.9 CM2
ECHO LA DIAMETER INDEX: 2.04 CM/M2
ECHO LA DIAMETER: 4 CM
ECHO LA MAJOR AXIS: 5.1 CM
ECHO LA MINOR AXIS: 4.7 CM
ECHO LA TO AORTIC ROOT RATIO: 1.25
ECHO LA VOL BP: 52 ML (ref 18–58)
ECHO LA VOL MOD A2C: 62 ML (ref 18–58)
ECHO LA VOL MOD A4C: 42 ML (ref 18–58)
ECHO LA VOL/BSA BIPLANE: 27 ML/M2 (ref 16–34)
ECHO LA VOLUME INDEX MOD A2C: 32 ML/M2 (ref 16–34)
ECHO LA VOLUME INDEX MOD A4C: 21 ML/M2 (ref 16–34)
ECHO LV E' LATERAL VELOCITY: 6 CM/S
ECHO LV E' SEPTAL VELOCITY: 5 CM/S
ECHO LV EDV A2C: 71 ML
ECHO LV EDV A4C: 101 ML
ECHO LV EDV INDEX A4C: 52 ML/M2
ECHO LV EDV NDEX A2C: 36 ML/M2
ECHO LV EJECTION FRACTION A2C: 64 %
ECHO LV EJECTION FRACTION A4C: 60 %
ECHO LV EJECTION FRACTION BIPLANE: 61 % (ref 55–100)
ECHO LV ESV A2C: 26 ML
ECHO LV ESV A4C: 41 ML
ECHO LV ESV INDEX A2C: 13 ML/M2
ECHO LV ESV INDEX A4C: 21 ML/M2
ECHO LV FRACTIONAL SHORTENING: 38 % (ref 28–44)
ECHO LV INTERNAL DIMENSION DIASTOLE INDEX: 2.3 CM/M2
ECHO LV INTERNAL DIMENSION DIASTOLIC: 4.5 CM (ref 4.2–5.9)
ECHO LV INTERNAL DIMENSION SYSTOLIC INDEX: 1.43 CM/M2
ECHO LV INTERNAL DIMENSION SYSTOLIC: 2.8 CM
ECHO LV IVSD: 1.1 CM (ref 0.6–1)
ECHO LV MASS 2D: 175 G (ref 88–224)
ECHO LV MASS INDEX 2D: 89.3 G/M2 (ref 49–115)
ECHO LV MID-CAVITY PEAK GRADIENT REST: 48 MMHG
ECHO LV MID-CAVITY PEAK GRADIENT VALSALVA: 55 MMHG
ECHO LV POSTERIOR WALL DIASTOLIC: 1.1 CM (ref 0.6–1)
ECHO LV RELATIVE WALL THICKNESS RATIO: 0.49
ECHO LVOT AREA: 3.5 CM2
ECHO LVOT AV VTI INDEX: 0.99
ECHO LVOT DIAM: 2.1 CM
ECHO LVOT MEAN GRADIENT: 3 MMHG
ECHO LVOT PEAK GRADIENT: 5 MMHG
ECHO LVOT PEAK VELOCITY: 1.1 M/S
ECHO LVOT STROKE VOLUME INDEX: 51.4 ML/M2
ECHO LVOT SV: 100.7 ML
ECHO LVOT VTI: 29.1 CM
ECHO MV A VELOCITY: 0.95 M/S
ECHO MV AREA VTI: 4.5 CM2
ECHO MV E DECELERATION TIME (DT): 145 MS
ECHO MV E VELOCITY: 0.48 M/S
ECHO MV E/A RATIO: 0.51
ECHO MV E/E' LATERAL: 8
ECHO MV E/E' RATIO (AVERAGED): 8.8
ECHO MV LVOT VTI INDEX: 0.77
ECHO MV MAX VELOCITY: 1.3 M/S
ECHO MV MEAN GRADIENT: 2 MMHG
ECHO MV MEAN VELOCITY: 0.7 M/S
ECHO MV PEAK GRADIENT: 6 MMHG
ECHO MV VTI: 22.3 CM
ECHO PV MAX VELOCITY: 1.2 M/S
ECHO PV PEAK GRADIENT: 5 MMHG
ECHO RV BASAL DIMENSION: 3.6 CM
ECHO RV FREE WALL PEAK S': 12 CM/S
ECHO RV TAPSE: 1.7 CM (ref 1.7–?)
EKG ATRIAL RATE: 85 BPM
EKG P AXIS: 65 DEGREES
EKG P-R INTERVAL: 156 MS
EKG Q-T INTERVAL: 414 MS
EKG QRS DURATION: 96 MS
EKG QTC CALCULATION (BAZETT): 492 MS
EKG R AXIS: 87 DEGREES
EKG T AXIS: 77 DEGREES
EKG VENTRICULAR RATE: 85 BPM
EOSINOPHIL # BLD: 0.26 K/UL (ref 0–0.44)
EOSINOPHILS RELATIVE PERCENT: 3 % (ref 1–4)
ERYTHROCYTE [DISTWIDTH] IN BLOOD BY AUTOMATED COUNT: 12.3 % (ref 11.8–14.4)
GFR SERPL CREATININE-BSD FRML MDRD: >60 ML/MIN/1.73M2
GLUCOSE SERPL-MCNC: 94 MG/DL (ref 70–99)
HCT VFR BLD AUTO: 44.1 % (ref 40.7–50.3)
HGB BLD-MCNC: 14.6 G/DL (ref 13–17)
IMM GRANULOCYTES # BLD AUTO: <0.03 K/UL (ref 0–0.3)
IMM GRANULOCYTES NFR BLD: 0 %
LYMPHOCYTES NFR BLD: 1.6 K/UL (ref 1.1–3.7)
LYMPHOCYTES RELATIVE PERCENT: 20 % (ref 24–43)
MCH RBC QN AUTO: 33.9 PG (ref 25.2–33.5)
MCHC RBC AUTO-ENTMCNC: 33.1 G/DL (ref 28.4–34.8)
MCV RBC AUTO: 102.3 FL (ref 82.6–102.9)
MONOCYTES NFR BLD: 1.05 K/UL (ref 0.1–1.2)
MONOCYTES NFR BLD: 13 % (ref 3–12)
NEUTROPHILS NFR BLD: 63 % (ref 36–65)
NEUTS SEG NFR BLD: 5.13 K/UL (ref 1.5–8.1)
NRBC BLD-RTO: 0 PER 100 WBC
PLATELET # BLD AUTO: 275 K/UL (ref 138–453)
PMV BLD AUTO: 8.5 FL (ref 8.1–13.5)
POTASSIUM SERPL-SCNC: 4.3 MMOL/L (ref 3.7–5.3)
RBC # BLD AUTO: 4.31 M/UL (ref 4.21–5.77)
SODIUM SERPL-SCNC: 125 MMOL/L (ref 135–144)
SODIUM SERPL-SCNC: 131 MMOL/L (ref 135–144)
WBC OTHER # BLD: 8.1 K/UL (ref 3.5–11.3)

## 2024-01-16 PROCEDURE — 2580000003 HC RX 258: Performed by: NURSE PRACTITIONER

## 2024-01-16 PROCEDURE — 6370000000 HC RX 637 (ALT 250 FOR IP): Performed by: NURSE PRACTITIONER

## 2024-01-16 PROCEDURE — 80048 BASIC METABOLIC PNL TOTAL CA: CPT

## 2024-01-16 PROCEDURE — 92507 TX SP LANG VOICE COMM INDIV: CPT

## 2024-01-16 PROCEDURE — 2500000003 HC RX 250 WO HCPCS: Performed by: NURSE PRACTITIONER

## 2024-01-16 PROCEDURE — 93306 TTE W/DOPPLER COMPLETE: CPT | Performed by: INTERNAL MEDICINE

## 2024-01-16 PROCEDURE — 2000000000 HC ICU R&B

## 2024-01-16 PROCEDURE — 99233 SBSQ HOSP IP/OBS HIGH 50: CPT | Performed by: PSYCHIATRY & NEUROLOGY

## 2024-01-16 PROCEDURE — 6360000002 HC RX W HCPCS: Performed by: NURSE PRACTITIONER

## 2024-01-16 PROCEDURE — 36415 COLL VENOUS BLD VENIPUNCTURE: CPT

## 2024-01-16 PROCEDURE — 92526 ORAL FUNCTION THERAPY: CPT

## 2024-01-16 PROCEDURE — 93010 ELECTROCARDIOGRAM REPORT: CPT | Performed by: INTERNAL MEDICINE

## 2024-01-16 PROCEDURE — 93306 TTE W/DOPPLER COMPLETE: CPT

## 2024-01-16 PROCEDURE — 94761 N-INVAS EAR/PLS OXIMETRY MLT: CPT

## 2024-01-16 PROCEDURE — 99291 CRITICAL CARE FIRST HOUR: CPT | Performed by: STUDENT IN AN ORGANIZED HEALTH CARE EDUCATION/TRAINING PROGRAM

## 2024-01-16 PROCEDURE — 2580000003 HC RX 258

## 2024-01-16 PROCEDURE — 6360000002 HC RX W HCPCS

## 2024-01-16 PROCEDURE — 84295 ASSAY OF SERUM SODIUM: CPT

## 2024-01-16 PROCEDURE — 70450 CT HEAD/BRAIN W/O DYE: CPT

## 2024-01-16 PROCEDURE — 85025 COMPLETE CBC W/AUTO DIFF WBC: CPT

## 2024-01-16 PROCEDURE — 2700000000 HC OXYGEN THERAPY PER DAY

## 2024-01-16 RX ORDER — SODIUM CHLORIDE 1 G/1
1 TABLET ORAL 2 TIMES DAILY WITH MEALS
Status: DISCONTINUED | OUTPATIENT
Start: 2024-01-16 | End: 2024-01-18

## 2024-01-16 RX ORDER — LISINOPRIL 20 MG/1
40 TABLET ORAL DAILY
Status: DISCONTINUED | OUTPATIENT
Start: 2024-01-17 | End: 2024-01-26 | Stop reason: HOSPADM

## 2024-01-16 RX ORDER — PHENOBARBITAL SODIUM 130 MG/ML
130 INJECTION INTRAMUSCULAR ONCE
Status: COMPLETED | OUTPATIENT
Start: 2024-01-16 | End: 2024-01-16

## 2024-01-16 RX ORDER — AMLODIPINE BESYLATE 10 MG/1
10 TABLET ORAL DAILY
Status: DISCONTINUED | OUTPATIENT
Start: 2024-01-16 | End: 2024-01-26 | Stop reason: HOSPADM

## 2024-01-16 RX ORDER — LORAZEPAM 2 MG/ML
2 INJECTION INTRAMUSCULAR ONCE
Status: COMPLETED | OUTPATIENT
Start: 2024-01-16 | End: 2024-01-16

## 2024-01-16 RX ORDER — HYDRALAZINE HYDROCHLORIDE 20 MG/ML
10 INJECTION INTRAMUSCULAR; INTRAVENOUS
Status: DISCONTINUED | OUTPATIENT
Start: 2024-01-16 | End: 2024-01-17

## 2024-01-16 RX ORDER — LORAZEPAM 2 MG/ML
INJECTION INTRAMUSCULAR
Status: COMPLETED
Start: 2024-01-16 | End: 2024-01-16

## 2024-01-16 RX ORDER — ENOXAPARIN SODIUM 100 MG/ML
40 INJECTION SUBCUTANEOUS DAILY
Status: DISCONTINUED | OUTPATIENT
Start: 2024-01-16 | End: 2024-01-26 | Stop reason: HOSPADM

## 2024-01-16 RX ORDER — CHLORDIAZEPOXIDE HYDROCHLORIDE 25 MG/1
50 CAPSULE, GELATIN COATED ORAL 4 TIMES DAILY
Status: DISCONTINUED | OUTPATIENT
Start: 2024-01-16 | End: 2024-01-16

## 2024-01-16 RX ORDER — DOCUSATE SODIUM 100 MG/1
100 CAPSULE, LIQUID FILLED ORAL DAILY
Status: DISCONTINUED | OUTPATIENT
Start: 2024-01-16 | End: 2024-01-18

## 2024-01-16 RX ORDER — LABETALOL HYDROCHLORIDE 5 MG/ML
10 INJECTION, SOLUTION INTRAVENOUS
Status: DISCONTINUED | OUTPATIENT
Start: 2024-01-16 | End: 2024-01-26 | Stop reason: HOSPADM

## 2024-01-16 RX ORDER — PHENOBARBITAL SODIUM 65 MG/ML
86 INJECTION INTRAMUSCULAR 3 TIMES DAILY
Status: DISCONTINUED | OUTPATIENT
Start: 2024-01-16 | End: 2024-01-17

## 2024-01-16 RX ORDER — LISINOPRIL 20 MG/1
20 TABLET ORAL ONCE
Status: COMPLETED | OUTPATIENT
Start: 2024-01-16 | End: 2024-01-16

## 2024-01-16 RX ADMIN — CHLORDIAZEPOXIDE HYDROCHLORIDE 50 MG: 25 CAPSULE ORAL at 10:11

## 2024-01-16 RX ADMIN — SODIUM CHLORIDE 7.5 MG/HR: 9 INJECTION, SOLUTION INTRAVENOUS at 03:52

## 2024-01-16 RX ADMIN — SODIUM CHLORIDE 7.5 MG/HR: 9 INJECTION, SOLUTION INTRAVENOUS at 13:10

## 2024-01-16 RX ADMIN — SODIUM CHLORIDE 10 MG/HR: 9 INJECTION, SOLUTION INTRAVENOUS at 22:03

## 2024-01-16 RX ADMIN — PHENOBARBITAL SODIUM 130 MG: 130 INJECTION INTRAMUSCULAR; INTRAVENOUS at 13:02

## 2024-01-16 RX ADMIN — LORAZEPAM 2 MG: 2 INJECTION INTRAMUSCULAR; INTRAVENOUS at 11:53

## 2024-01-16 RX ADMIN — PHENOBARBITAL SODIUM 130 MG: 130 INJECTION INTRAMUSCULAR; INTRAVENOUS at 13:47

## 2024-01-16 RX ADMIN — LORAZEPAM 1 MG: 2 INJECTION INTRAMUSCULAR; INTRAVENOUS at 00:01

## 2024-01-16 RX ADMIN — AMLODIPINE BESYLATE 10 MG: 10 TABLET ORAL at 10:11

## 2024-01-16 RX ADMIN — THIAMINE HYDROCHLORIDE: 100 INJECTION, SOLUTION INTRAMUSCULAR; INTRAVENOUS at 08:17

## 2024-01-16 RX ADMIN — LISINOPRIL 20 MG: 20 TABLET ORAL at 07:58

## 2024-01-16 RX ADMIN — SODIUM CHLORIDE 7.5 MG/HR: 9 INJECTION, SOLUTION INTRAVENOUS at 10:05

## 2024-01-16 RX ADMIN — LABETALOL HYDROCHLORIDE 10 MG: 5 INJECTION, SOLUTION INTRAVENOUS at 16:52

## 2024-01-16 RX ADMIN — LORAZEPAM 2 MG: 2 INJECTION INTRAMUSCULAR at 16:31

## 2024-01-16 RX ADMIN — SODIUM CHLORIDE, PRESERVATIVE FREE 10 ML: 5 INJECTION INTRAVENOUS at 08:18

## 2024-01-16 RX ADMIN — LABETALOL HYDROCHLORIDE 10 MG: 5 INJECTION, SOLUTION INTRAVENOUS at 22:33

## 2024-01-16 RX ADMIN — SODIUM CHLORIDE 10 MG/HR: 9 INJECTION, SOLUTION INTRAVENOUS at 06:43

## 2024-01-16 RX ADMIN — SODIUM CHLORIDE TAB 1 GM 1 G: 1 TAB at 10:21

## 2024-01-16 RX ADMIN — DOCUSATE SODIUM 100 MG: 100 CAPSULE, LIQUID FILLED ORAL at 08:29

## 2024-01-16 RX ADMIN — SODIUM CHLORIDE 5 MG/HR: 9 INJECTION, SOLUTION INTRAVENOUS at 00:02

## 2024-01-16 RX ADMIN — ENOXAPARIN SODIUM 40 MG: 100 INJECTION SUBCUTANEOUS at 10:12

## 2024-01-16 RX ADMIN — SODIUM CHLORIDE, PRESERVATIVE FREE 10 ML: 5 INJECTION INTRAVENOUS at 20:56

## 2024-01-16 RX ADMIN — LORAZEPAM 2 MG: 2 INJECTION INTRAMUSCULAR; INTRAVENOUS at 16:31

## 2024-01-16 RX ADMIN — PHENOBARBITAL SODIUM 86 MG: 65 INJECTION INTRAMUSCULAR; INTRAVENOUS at 20:56

## 2024-01-16 RX ADMIN — LORAZEPAM 2 MG: 2 INJECTION INTRAMUSCULAR at 11:53

## 2024-01-16 RX ADMIN — SODIUM CHLORIDE 10 MG/HR: 9 INJECTION, SOLUTION INTRAVENOUS at 18:44

## 2024-01-16 RX ADMIN — LABETALOL HYDROCHLORIDE 10 MG: 5 INJECTION, SOLUTION INTRAVENOUS at 18:47

## 2024-01-16 RX ADMIN — LISINOPRIL 20 MG: 20 TABLET ORAL at 08:28

## 2024-01-16 NOTE — PROGRESS NOTES
Patient unable get video swallow completed due to agitation. RN notified radiology. Neuro critical team aware.

## 2024-01-16 NOTE — PROGRESS NOTES
Endovascular Neurosurgery Progress Note    SUBJECTIVE:     Still very dysarthric and weak on the right side, wife at bedside    Review of Systems:  CONSTITUTIONAL:  negative for fevers, chills, fatigue and malaise    EYES:  negative for double vision, blurred vision and photophobia     HEENT:  negative for tinnitus, epistaxis and sore throat    RESPIRATORY:  negative for cough, shortness of breath, wheezing    CARDIOVASCULAR:  negative for chest pain, palpitations, syncope, edema    GASTROINTESTINAL:  negative for nausea, vomiting    GENITOURINARY:  negative for incontinence    MUSCULOSKELETAL:  negative for neck or back pain    NEUROLOGICAL:  Negative for weakness and tingling  negative for headaches and dizziness    PSYCHIATRIC:  negative for anxiety      Review of systems otherwise negative.      OBJECTIVE:     Vitals:    01/16/24 1030   BP: (!) 185/167   Pulse: 100   Resp: 18   Temp:    SpO2: 93%        General:  Gen: normal habitus, NAD  HEENT: NCAT, mucosa moist  Cvs: RRR, S1 S2 normal  Resp: symmetric unlabored breathing  Abd: s/nd/nt  Ext: no edema  Skin: no lesions seen, warm and dry    Neuro:  Gen: awake and alert, oriented x3.   Lang/speech: no aphasia or dysarthria. Follows commands.  CN: PERRL, EOMI, VFF, V1-3 intact, face symmetric, hearing intact, shoulder shrug symmetric, tongue midline  Motor: grossly 5/5 UE and LE b/l  Sense: LT intact in all 4 ext.  Coord: FTN and HTS intact b/l  DTR: deferred  Gait: deferred    NIH Stroke Scale:   1a  Level of consciousness: 0 - alert; keenly responsive   1b. LOC questions:  0 - answers both questions correctly   1c. LOC commands: 0 - performs both tasks correctly   2.  Best Gaze: 0 - normal   3. Visual: 0 - no visual loss   4. Facial Palsy: 1 - minor paralysis (flattened nasolabial fold, asymmetric on smiling)   5a. Motor left arm: 0 - no drift, limb holds 90 (or 45) degrees for full 10 seconds   5b.  Motor right arm: 2 - some effort against gravity, limb  cannot get to or maintain (if cued) 90 (or 45) degrees, drifts down to bed, but has some effort against gravity    6a. Motor left le - no drift; leg holds 30 degree position for full 5 seconds   6b  Motor right le - drift; leg falls by the end of the 5 second period but does not hit bed   7. Limb Ataxia: 0 - absent   8.  Sensory: 1 - mild to moderate sensory loss; patient feels pinprick is less sharp or is dull on the affected side; there is a loss of superficial pain with pinprick but patient is aware of being touched    9. Best Language:  0 - no aphasia, normal   10. Dysarthria: 2 - severe; patient speech is so slurred as to be unintelligible in the absence of or our of proportion to any dysphagia, or is mute/anarthric    11. Extinction and Inattention: 0 - no abnormality         Total:   7     MRS: 4      LABS:   Reviewed.  Lab Results   Component Value Date    HGB 14.6 2024    WBC 8.1 2024     2024     (L) 2024    BUN 7 2024    CREATININE 0.5 (L) 2024    AST 24 2020    ALT 23 2020    APTT 27.8 2024    INR 0.9 2024      No results found for: \"COVID19\"    RADIOLOGY:   Images were personally reviewed including:    CTA head neck 2024: chronic left VA occlusion      R VA origin severe stenosis          ASSESSMENT:     52 yo male with history of HTN, alcoholism, and polysubstance abuse who presents with acute onset aphasia and right sided weakness. Initial -210. CT head showed acute left basal ganglia IPH, ICH score 0. 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     PLAN:     - no endovascular intervention for now  - aspirin 81mg when possible  - out patient follow up of R VA origin severe stenosis    - f/u with Dr. Estrella in 3 months    Case discussed with Dr. Estrella attending.    Jay Jay Archer MD PGY 8  Stroke, Neurocritical Care & Neurointervention  [facility]  [facility]

## 2024-01-16 NOTE — PROGRESS NOTES
Patient agitated/trying to get out of bed with sister at bedside. RN notified Gisel CERRATO. New orders received.

## 2024-01-16 NOTE — PLAN OF CARE
Problem: Discharge Planning  Goal: Discharge to home or other facility with appropriate resources  Outcome: Progressing     Problem: Safety - Adult  Goal: Free from fall injury  1/15/2024 2108 by Josette Valderrama RN  Outcome: Progressing  1/15/2024 1932 by Constanza Rojas RN  Outcome: Progressing     Problem: Pain  Goal: Verbalizes/displays adequate comfort level or baseline comfort level  1/15/2024 2108 by Josette Valderrama RN  Outcome: Progressing  Flowsheets (Taken 1/15/2024 2000)  Verbalizes/displays adequate comfort level or baseline comfort level:   Encourage patient to monitor pain and request assistance   Assess pain using appropriate pain scale  1/15/2024 1932 by Constanza Rojas RN  Outcome: Progressing     Problem: Skin/Tissue Integrity  Goal: Absence of new skin breakdown  Description: 1.  Monitor for areas of redness and/or skin breakdown  2.  Assess vascular access sites hourly  3.  Every 4-6 hours minimum:  Change oxygen saturation probe site  4.  Every 4-6 hours:  If on nasal continuous positive airway pressure, respiratory therapy assess nares and determine need for appliance change or resting period.  1/15/2024 2108 by Josette Valderrama RN  Outcome: Progressing  1/15/2024 1932 by Constanza Rojas RN  Outcome: Progressing     Problem: ABCDS Injury Assessment  Goal: Absence of physical injury  1/15/2024 2108 by Josette Valderrama RN  Outcome: Progressing  1/15/2024 1932 by Constanza oRjas RN  Outcome: Progressing     Problem: Safety - Medical Restraint  Goal: Remains free of injury from restraints (Restraint for Interference with Medical Device)  Description: INTERVENTIONS:  1. Determine that other, less restrictive measures have been tried or would not be effective before applying the restraint  2. Evaluate the patient's condition at the time of restraint application  3. Inform patient/family regarding the reason for restraint  4. Q2H: Monitor safety, psychosocial status,

## 2024-01-16 NOTE — PROGRESS NOTES
Patient coughing/choking with thin liquids. RN notified Gisel NP. Verbal order per NP to keep patient NPO.

## 2024-01-16 NOTE — PLAN OF CARE
hydration  Taken 1/16/2024 0600 by Josette Valderrama, RN  Remains free of injury from restraints (restraint for interference with medical device): Determine that other, less restrictive measures have been tried or would not be effective before applying the restraint

## 2024-01-16 NOTE — PROGRESS NOTES
Brother states \"if my brother isn't placed on medication to help with withdrawals, I'm going to bring him a beer\". Writer explained to brother the importance of not bringing anything in the hospital without permission.

## 2024-01-16 NOTE — PROGRESS NOTES
Patient placed in four point restraints. Patient pulled art line out. RN asked charge nurse for bedside sitter. Patient's heart rate 120's. RN notified Gisel CERRATO.

## 2024-01-16 NOTE — PROGRESS NOTES
Daily Progress Note  Neuro Critical Care    Patient Name: Jagdeep Quintana Sr.  Patient : 1970  Room/Bed: [facility]/0120-01  Code Status: Full  Allergies: No Known Allergies    CHIEF COMPLAINT:      Right sided weakness, aphasia     INTERVAL HISTORY    Initial Presentation (Admitted 1/15/24):  The patient is a 52 yo male with history of HTN, alcoholism, and polysubstance abuse who presents with acute onset right sided weakness and aphasia while at home with his brother. LKW 6pm. Patient presented to the ER with right sided facial weakness, right sided upper and lower extremity weakness. CT head showed left basal ganglia hemorrhage. Initial -210. Patient was started on Cardene infusion to maintain SBP < 140. CTA head/neck unremarkable for vascular abnormality, showed severe stenosis right VA, occlusion at the origin of left VA, and moderate stenosis L PCA. Patient admitted to neuro ICU for close monitoring.    Of note, patient lives at home with his girlfriend. Is independent as baseline. Family states he is a heavy drinker but recently decreased his alcohol intake, believe he drinks on average 4-40oz beers daily. Girlfriend states she believes he uses illicit drugs but is unsure what kind. UDS positive for Cocaine. Family believes he is compliant with antihypertensives, although pharmacy verified last refill was 2 years ago.     Interval Events:   Repeat CT head this morning is stable. SBP < 160, wean cardene as tolerated. Increase Lisinopril 40mg QD and add Norvasc 10 mg QD. Patient has been agitated and tachycardic, concerning for alcohol withdraw. Start Librium 50 mg q6h. Despite Librium dose, patient remains tachycardic and agitated- started phenobarb dosing. NA downtrending, 125 this morning. Start salt tabs 1g BID and free water restrict 1500 mL daily. Repeat NA at 1400.     CURRENT MEDICATIONS:  SCHEDULED MEDICATIONS:   [START ON 2024] lisinopril  40 mg Oral Daily    lisinopril  20 mg Oral

## 2024-01-16 NOTE — PROGRESS NOTES
SPEECH LANGUAGE PATHOLOGY  Speech Language Pathology  [facility] NEURO ICU    Speech Language/Dysphagia Treatment Note    Date: 1/16/2024  Patient’s Name: Jagdeep Quintana Sr.  MRN: [id number]  Diagnosis:   Patient Active Problem List   Diagnosis Code    Essential hypertension I10    Chronic shoulder pain M25.519, G89.29    Acute low back pain M54.50    Cervical pain M54.2    Neck sprain S13.9XXA    Lumbar sprain S33.5XXA    DDD (degenerative disc disease), cervical M50.30    DDD (degenerative disc disease), lumbar M51.36    Smoker F17.200    Acquired spondylolisthesis M43.10    Intraparenchymal hemorrhage of brain (HCC) I61.9    Intracranial hemorrhage (HCC) I62.9    Hyponatremia E87.1       Pain: no c/o pain    Speech and Language/Dysphagia Treatment  Treatment time: 8783-2970    Subjective: [x] Alert [x] Cooperative     [] Confused     [] Agitated    [] Lethargic      Objective/Assessment:  Auditory Comprehension: Simple directions: occasional repetition required. Followed 1-2 step directions for OMEX with mod A/models.   Y/N (simple): 80% (I), improved to 90% with mod A/repeated or rephrased stimuli.     Verbal Expression: Automatic speech (counting 1-10): 100% MI (cues for initiation).  Personal/bio info: 100% accuracy, however decreased intelligibility so required cues for repetition.   Associated items/opposites: 60% (I), improved to 75% with repeated stimuli and 95% with mod semantic cues.     Speech: Pt presents with severe dysarthria characterized by imprecise articulation due to decreased orofacial strength/coordination/ROM (R side impairment > L). Some improvement noted with cues to repeat/over-articulate for improved intelligibility. Pt completed OMEX for facial weakness x 5 reps x 1 set with mod A/models.     Voice: Pt demo low volume, able to improve vocal intensity mildly given min cues.     Dysphagia: ST received order for repeat bedside swallow evaluation due to reports from RN of Pt coughing/choking for

## 2024-01-16 NOTE — PLAN OF CARE
Problem: Safety - Adult  Goal: Free from fall injury  1/15/2024 1932 by Constanza Rojas, RN  Outcome: Progressing     Problem: Pain  Goal: Verbalizes/displays adequate comfort level or baseline comfort level  1/15/2024 1932 by Constanza Rojas, RN  Outcome: Progressing     Problem: Skin/Tissue Integrity  Goal: Absence of new skin breakdown  Description: 1.  Monitor for areas of redness and/or skin breakdown  2.  Assess vascular access sites hourly  3.  Every 4-6 hours minimum:  Change oxygen saturation probe site  4.  Every 4-6 hours:  If on nasal continuous positive airway pressure, respiratory therapy assess nares and determine need for appliance change or resting period.  Outcome: Progressing     Problem: ABCDS Injury Assessment  Goal: Absence of physical injury  Outcome: Progressing     Problem: Safety - Medical Restraint  Goal: Remains free of injury from restraints (Restraint for Interference with Medical Device)  Description: INTERVENTIONS:  1. Determine that other, less restrictive measures have been tried or would not be effective before applying the restraint  2. Evaluate the patient's condition at the time of restraint application  3. Inform patient/family regarding the reason for restraint  4. Q2H: Monitor safety, psychosocial status, comfort, nutrition and hydration  Outcome: Progressing  Flowsheets (Taken 1/15/2024 1929)  Remains free of injury from restraints (restraint for interference with medical device):   Determine that other, less restrictive measures have been tried or would not be effective before applying the restraint   Evaluate the patient's condition at the time of restraint application   Inform patient/family regarding the reason for restraint   Every 2 hours: Monitor safety, psychosocial status, comfort, nutrition and hydration

## 2024-01-17 ENCOUNTER — APPOINTMENT (OUTPATIENT)
Dept: GENERAL RADIOLOGY | Age: 54
End: 2024-01-17
Payer: COMMERCIAL

## 2024-01-17 PROBLEM — F10.931 ALCOHOL WITHDRAWAL SYNDROME, WITH DELIRIUM (HCC): Status: ACTIVE | Noted: 2024-01-17

## 2024-01-17 LAB
ANION GAP SERPL CALCULATED.3IONS-SCNC: 10 MMOL/L (ref 9–17)
BASOPHILS # BLD: 0.04 K/UL (ref 0–0.2)
BASOPHILS NFR BLD: 0 % (ref 0–2)
BUN SERPL-MCNC: 8 MG/DL (ref 6–20)
CALCIUM SERPL-MCNC: 8.7 MG/DL (ref 8.6–10.4)
CHLORIDE SERPL-SCNC: 95 MMOL/L (ref 98–107)
CO2 SERPL-SCNC: 22 MMOL/L (ref 20–31)
CREAT SERPL-MCNC: 0.6 MG/DL (ref 0.7–1.2)
EOSINOPHIL # BLD: 0.14 K/UL (ref 0–0.44)
EOSINOPHILS RELATIVE PERCENT: 2 % (ref 1–4)
ERYTHROCYTE [DISTWIDTH] IN BLOOD BY AUTOMATED COUNT: 12.2 % (ref 11.8–14.4)
GFR SERPL CREATININE-BSD FRML MDRD: >60 ML/MIN/1.73M2
GLUCOSE SERPL-MCNC: 95 MG/DL (ref 70–99)
HCT VFR BLD AUTO: 42.3 % (ref 40.7–50.3)
HGB BLD-MCNC: 14.5 G/DL (ref 13–17)
IMM GRANULOCYTES # BLD AUTO: <0.03 K/UL (ref 0–0.3)
IMM GRANULOCYTES NFR BLD: 0 %
LYMPHOCYTES NFR BLD: 1.81 K/UL (ref 1.1–3.7)
LYMPHOCYTES RELATIVE PERCENT: 20 % (ref 24–43)
MCH RBC QN AUTO: 33.4 PG (ref 25.2–33.5)
MCHC RBC AUTO-ENTMCNC: 34.3 G/DL (ref 28.4–34.8)
MCV RBC AUTO: 97.5 FL (ref 82.6–102.9)
MONOCYTES NFR BLD: 1.16 K/UL (ref 0.1–1.2)
MONOCYTES NFR BLD: 13 % (ref 3–12)
NEUTROPHILS NFR BLD: 65 % (ref 36–65)
NEUTS SEG NFR BLD: 6.12 K/UL (ref 1.5–8.1)
NRBC BLD-RTO: 0 PER 100 WBC
PLATELET # BLD AUTO: 269 K/UL (ref 138–453)
PMV BLD AUTO: 8.5 FL (ref 8.1–13.5)
POTASSIUM SERPL-SCNC: 4 MMOL/L (ref 3.7–5.3)
RBC # BLD AUTO: 4.34 M/UL (ref 4.21–5.77)
SODIUM SERPL-SCNC: 127 MMOL/L (ref 135–144)
WBC OTHER # BLD: 9.3 K/UL (ref 3.5–11.3)

## 2024-01-17 PROCEDURE — 2580000003 HC RX 258: Performed by: NURSE PRACTITIONER

## 2024-01-17 PROCEDURE — 2500000003 HC RX 250 WO HCPCS: Performed by: NURSE PRACTITIONER

## 2024-01-17 PROCEDURE — 97163 PT EVAL HIGH COMPLEX 45 MIN: CPT

## 2024-01-17 PROCEDURE — 2580000003 HC RX 258

## 2024-01-17 PROCEDURE — 74230 X-RAY XM SWLNG FUNCJ C+: CPT

## 2024-01-17 PROCEDURE — 85025 COMPLETE CBC W/AUTO DIFF WBC: CPT

## 2024-01-17 PROCEDURE — 6370000000 HC RX 637 (ALT 250 FOR IP): Performed by: NURSE PRACTITIONER

## 2024-01-17 PROCEDURE — 97530 THERAPEUTIC ACTIVITIES: CPT

## 2024-01-17 PROCEDURE — 97167 OT EVAL HIGH COMPLEX 60 MIN: CPT

## 2024-01-17 PROCEDURE — 6360000002 HC RX W HCPCS: Performed by: NURSE PRACTITIONER

## 2024-01-17 PROCEDURE — 99254 IP/OBS CNSLTJ NEW/EST MOD 60: CPT | Performed by: PHYSICAL MEDICINE & REHABILITATION

## 2024-01-17 PROCEDURE — 99291 CRITICAL CARE FIRST HOUR: CPT | Performed by: STUDENT IN AN ORGANIZED HEALTH CARE EDUCATION/TRAINING PROGRAM

## 2024-01-17 PROCEDURE — 36415 COLL VENOUS BLD VENIPUNCTURE: CPT

## 2024-01-17 PROCEDURE — 97535 SELF CARE MNGMENT TRAINING: CPT

## 2024-01-17 PROCEDURE — 80048 BASIC METABOLIC PNL TOTAL CA: CPT

## 2024-01-17 PROCEDURE — 99232 SBSQ HOSP IP/OBS MODERATE 35: CPT | Performed by: PSYCHIATRY & NEUROLOGY

## 2024-01-17 PROCEDURE — 2000000000 HC ICU R&B

## 2024-01-17 PROCEDURE — 92611 MOTION FLUOROSCOPY/SWALLOW: CPT

## 2024-01-17 RX ORDER — HYDRALAZINE HYDROCHLORIDE 20 MG/ML
10 INJECTION INTRAMUSCULAR; INTRAVENOUS
Status: DISCONTINUED | OUTPATIENT
Start: 2024-01-17 | End: 2024-01-26 | Stop reason: HOSPADM

## 2024-01-17 RX ORDER — LANOLIN ALCOHOL/MO/W.PET/CERES
100 CREAM (GRAM) TOPICAL DAILY
Status: DISCONTINUED | OUTPATIENT
Start: 2024-01-18 | End: 2024-01-26 | Stop reason: HOSPADM

## 2024-01-17 RX ORDER — PHENOBARBITAL SODIUM 65 MG/ML
49 INJECTION INTRAMUSCULAR 3 TIMES DAILY
Status: DISCONTINUED | OUTPATIENT
Start: 2024-01-17 | End: 2024-01-18

## 2024-01-17 RX ORDER — FOLIC ACID 1 MG/1
1 TABLET ORAL DAILY
Status: DISCONTINUED | OUTPATIENT
Start: 2024-01-18 | End: 2024-01-26 | Stop reason: HOSPADM

## 2024-01-17 RX ORDER — NICOTINE 21 MG/24HR
1 PATCH, TRANSDERMAL 24 HOURS TRANSDERMAL DAILY
Status: DISCONTINUED | OUTPATIENT
Start: 2024-01-17 | End: 2024-01-26 | Stop reason: HOSPADM

## 2024-01-17 RX ORDER — IPRATROPIUM BROMIDE AND ALBUTEROL SULFATE 2.5; .5 MG/3ML; MG/3ML
1 SOLUTION RESPIRATORY (INHALATION) EVERY 4 HOURS PRN
Status: DISCONTINUED | OUTPATIENT
Start: 2024-01-17 | End: 2024-01-26 | Stop reason: HOSPADM

## 2024-01-17 RX ADMIN — THIAMINE HYDROCHLORIDE: 100 INJECTION, SOLUTION INTRAMUSCULAR; INTRAVENOUS at 08:15

## 2024-01-17 RX ADMIN — SODIUM CHLORIDE, PRESERVATIVE FREE 10 ML: 5 INJECTION INTRAVENOUS at 08:19

## 2024-01-17 RX ADMIN — SODIUM CHLORIDE TAB 1 GM 1 G: 1 TAB at 12:03

## 2024-01-17 RX ADMIN — AMLODIPINE BESYLATE 10 MG: 10 TABLET ORAL at 12:03

## 2024-01-17 RX ADMIN — PHENOBARBITAL SODIUM 49 MG: 65 INJECTION INTRAMUSCULAR; INTRAVENOUS at 20:05

## 2024-01-17 RX ADMIN — PHENOBARBITAL SODIUM 86 MG: 65 INJECTION INTRAMUSCULAR; INTRAVENOUS at 08:15

## 2024-01-17 RX ADMIN — ENOXAPARIN SODIUM 40 MG: 100 INJECTION SUBCUTANEOUS at 08:19

## 2024-01-17 RX ADMIN — DOCUSATE SODIUM 100 MG: 100 CAPSULE, LIQUID FILLED ORAL at 12:03

## 2024-01-17 RX ADMIN — SODIUM CHLORIDE 10 MG/HR: 9 INJECTION, SOLUTION INTRAVENOUS at 01:00

## 2024-01-17 RX ADMIN — LISINOPRIL 40 MG: 20 TABLET ORAL at 12:03

## 2024-01-17 RX ADMIN — SODIUM CHLORIDE 7.5 MG/HR: 9 INJECTION, SOLUTION INTRAVENOUS at 03:57

## 2024-01-17 RX ADMIN — SODIUM CHLORIDE TAB 1 GM 1 G: 1 TAB at 16:54

## 2024-01-17 NOTE — PROCEDURES
INSTRUMENTAL SWALLOW REPORT  MODIFIED BARIUM SWALLOW    NAME: Jagdeep Quintana Sr.   : 1970  MRN: [id number]       Date of Eval: 2024              Referring Diagnosis(es):      Past Medical History:  has a past medical history of Hypertension.  Past Surgical History:  has a past surgical history that includes Finger surgery.         Type of Study: Initial MBS    Patient Complaints/Reason for Referral:  Jagdeep Quintana Sr. was referred for a MBS to assess the efficiency of his/her swallow function, assess for aspiration, and to make recommendations regarding safe dietary consistencies, effective compensatory strategies, and safe eating environment.       Onset of problem:      53-year-old male with past medical history HTN, DDD, smoking who presents to the ED as a race stroke alert.  Last known well was approximately 1800 earlier today.  He presented with right upper and right lower extremity weakness, aphasia, dysarthria, right-sided facial droop.  He was found to be hypertensive ( on arrival).  NIH 13 for aphasia, right sided drift, facial droop, sensory loss. EMS reported no use of blood thinners.  The patient has incomprehensible speech, global aphasia, for this reason history is limited.  Imaging significant for IPH left basal ganglia.  Patient started on Cardene drip for SBP goal < 140.  Patient admitted to neuro ICU for further care.           Behavior/Cognition/Vision/Hearing:  Behavior/Cognition: Alert;Cooperative  Vision: Impaired  Hearing: Within functional limits    Impressions:  Patient presents with  safe swallow for Dysphagia Minced and Moist (Dysphagia II)  diet with Mildly Thick (Nectar)  liquids as evidenced by no observed aspiration noted with consistencies tested.  Pt with + silent aspiration noted with thin liquids. Pt with + swallow delay, increasing risk for aspiration if not seated upright at 90 degrees for all PO intake. Recommend small sips and bites, only feed when  observed clinical signs of aspiration      Oral Preparation / Oral Phase: impaired     Extended mastication. Decreased bolus formation with spillover to pyriform with swallow delay, + oral residual      Pharyngeal Phase: Impaired     Puree: Spill to pyriform no penetration no aspiration min residual  Soft Solids: spill to pyriform no penetration no aspiration mild pyriform stasis  Thin straw: + spill to pyriform + penetration with + silent aspiration mod vallec and pyriform residual decreased with spontaneous second swallow.  Nectar: Spill to pyriform no penetration no aspiration mild stasis    Esophageal Phase  Esophageal Screen: WFL        Pain    0/10         Therapy Time:   Individual Concurrent Group Co-treatment   Time In  1040         Time Out  1100         Minutes  20                   ELFEGO Rosen, 1/17/2024, 12:47 PM

## 2024-01-17 NOTE — PLAN OF CARE
Problem: Discharge Planning  Goal: Discharge to home or other facility with appropriate resources  Outcome: Progressing     Problem: Safety - Adult  Goal: Free from fall injury  Outcome: Progressing     Problem: Pain  Goal: Verbalizes/displays adequate comfort level or baseline comfort level  Outcome: Progressing     Problem: Skin/Tissue Integrity  Goal: Absence of new skin breakdown  Description: 1.  Monitor for areas of redness and/or skin breakdown  2.  Assess vascular access sites hourly  3.  Every 4-6 hours minimum:  Change oxygen saturation probe site  4.  Every 4-6 hours:  If on nasal continuous positive airway pressure, respiratory therapy assess nares and determine need for appliance change or resting period.  Outcome: Progressing     Problem: ABCDS Injury Assessment  Goal: Absence of physical injury  Outcome: Progressing     Problem: Safety - Medical Restraint  Goal: Remains free of injury from restraints (Restraint for Interference with Medical Device)  Description: INTERVENTIONS:  1. Determine that other, less restrictive measures have been tried or would not be effective before applying the restraint  2. Evaluate the patient's condition at the time of restraint application  3. Inform patient/family regarding the reason for restraint  4. Q2H: Monitor safety, psychosocial status, comfort, nutrition and hydration  Outcome: Progressing  Flowsheets  Taken 1/17/2024 0600  Remains free of injury from restraints (restraint for interference with medical device): Determine that other, less restrictive measures have been tried or would not be effective before applying the restraint  Taken 1/17/2024 0400  Remains free of injury from restraints (restraint for interference with medical device):   Determine that other, less restrictive measures have been tried or would not be effective before applying the restraint   Every 2 hours: Monitor safety, psychosocial status, comfort, nutrition and hydration  Taken

## 2024-01-17 NOTE — CARE COORDINATION
Patient is unable to participate in transition plan.  Will contact family to discuss SNF, home care and ARU options    1130 Spoke to daughter Gladys regarding transition plan.  I have provided her with SNF HC and AUR list.  She tells me that she would like a referral sent to [facility] as someone has recommended them.  I informed her of the criteria that needs to be met. And that the physician will make that determination.  She states understanding and will review the SNF and HC list.

## 2024-01-17 NOTE — PROGRESS NOTES
Physician Progress Note      PATIENT:               CARRIE BROWN  CSN #:                  [id number]  :                       1970  ADMIT DATE:       2024 6:26 PM  DISCH DATE:  RESPONDING  PROVIDER #:        HOMAR WEI APRN - CNP          QUERY TEXT:    Pt admitted with Acute IPH Documented likely as hypertensive per endovascular   consult on admission with initial SBP of 200s. was started on iv Cardene. If   possible, please document in progress notes and discharge summary if you are   evaluating and/or treating any of the following:    The medical record reflects the following:  Risk Factors: history of HTN  Clinical Indicators: Hypertensive IPH, initial SBP of 204, BP range 140s to   180s systolic with diastolic BP as high as 108  Treatment: iv cardene, ICU monitoring, Art line placement    Hypertensive Urgency: Defined as SBP of >180 OR DBP >120 w/o associated organ   damage. S/s may or may not be present, but can include severe headache, SOB,   epistaxis, severe anxiety. Tx: adjustment of oral BP medication; IV meds not   usually required.  Hypertensive Emergency: SBP of >180 OR DBP >120 w/ associated organ damage   (CVA, MI, acute CHF, HERBERT, encephalopathy, pulmonary edema, and unstable   angina). Documentation should include specific organ affected.  Requires   immediate treatment, usually with IV meds.  Hypertensive Crisis, unspecified: SBP of > 180 OR DBP > 120 and includes   damage to blood vessels, including inflammation, leakage of fluid or blood and   can cause stroke, headache, heart failure and eclampsia.  Source:  MS-DRG Training Guide and Quick Reference Guide    Thank You Niranjna MONTEMAYOR BSN CCDS  Email [email or web address]  Cell [phone]  office hours M-F 6am to 2:30p  Options provided:  -- Hypertensive Crisis  -- Hypertensive Emergency  -- Hypertensive Urgency  -- Other - I will add my own diagnosis  -- Disagree - Not applicable / Not valid  -- Disagree - Clinically unable to

## 2024-01-17 NOTE — PROGRESS NOTES
some effort against gravity, limb cannot get to or maintain (if cued) 90 (or 45) degrees, drifts down to bed, but has some effort against gravity    6a. Motor left le - no drift; leg holds 30 degree position for full 5 seconds   6b  Motor right le - drift; leg falls by the end of the 5 second period but does not hit bed   7. Limb Ataxia: 0 - absent   8.  Sensory: 1 - mild to moderate sensory loss; patient feels pinprick is less sharp or is dull on the affected side; there is a loss of superficial pain with pinprick but patient is aware of being touched    9. Best Language:  0 - no aphasia, normal   10. Dysarthria: 2 - severe; patient speech is so slurred as to be unintelligible in the absence of or our of proportion to any dysphagia, or is mute/anarthric    11. Extinction and Inattention: 0 - no abnormality         Total:   7     MRS: 4      LABS:   Reviewed.  Lab Results   Component Value Date    HGB 14.5 2024    WBC 9.3 2024     2024     (L) 2024    BUN 8 2024    CREATININE 0.6 (L) 2024    AST 24 2020    ALT 23 2020    APTT 27.8 2024    INR 0.9 2024      No results found for: \"COVID19\"    RADIOLOGY:   Images were personally reviewed including:    CTA head neck 2024: chronic left VA occlusion      R VA origin severe stenosis          ASSESSMENT:     52 yo male with history of HTN, alcoholism, and polysubstance abuse who presents with acute onset aphasia and right sided weakness. Initial -210. CT head showed acute left basal ganglia IPH, ICH score 0. 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     PLAN:     - no endovascular intervention for now  - aspirin 81mg when possible  - out patient follow up of R VA origin severe stenosis    - f/u with Dr. Estrella in 3 months    Case discussed with Dr. Estrella attending.    Jay Jay Archer MD PGY 8  Stroke, Neurocritical Care & Neurointervention  [facility]  [facility]  [facility]  [facility]  Electronically signed 1/17/2024 at 9:31 AM

## 2024-01-17 NOTE — CARE COORDINATION
Received a voicemail from Deandra at [facility].  They have received referral and they currently have no beds.  Will updated them with discharge time frame and ask to follow

## 2024-01-17 NOTE — PLAN OF CARE
0800  Remains free of injury from restraints (restraint for interference with medical device):   Determine that other, less restrictive measures have been tried or would not be effective before applying the restraint   Evaluate the patient's condition at the time of restraint application   Inform patient/family regarding the reason for restraint   Every 2 hours: Monitor safety, psychosocial status, comfort, nutrition and hydration

## 2024-01-17 NOTE — PROGRESS NOTES
Daily Progress Note  Neuro Critical Care    Patient Name: Jagdeep Quintana Sr.  Patient : 1970  Room/Bed: [facility]/0120-01  Code Status: Full  Allergies: No Known Allergies    CHIEF COMPLAINT:      Right sided weakness, aphasia     INTERVAL HISTORY    Initial Presentation (Admitted 1/15/24):  The patient is a 54 yo male with history of HTN, alcoholism, and polysubstance abuse who presents with acute onset right sided weakness and aphasia while at home with his brother. LKW 6pm. Patient presented to the ER with right sided facial weakness, right sided upper and lower extremity weakness. CT head showed left basal ganglia hemorrhage. Initial -210. Patient was started on Cardene infusion to maintain SBP < 140. CTA head/neck unremarkable for vascular abnormality, showed severe stenosis right VA, occlusion at the origin of left VA, and moderate stenosis L PCA. Patient admitted to neuro ICU for close monitoring.    Of note, patient lives at home with his girlfriend. Is independent as baseline. Family states he is a heavy drinker but recently decreased his alcohol intake, believe he drinks on average 4-40oz beers daily. Girlfriend states she believes he uses illicit drugs but is unsure what kind. UDS positive for Cocaine. Family believes he is compliant with antihypertensives, although pharmacy verified last refill was 2 years ago.     Hospital Course:  : Repeat CT head this morning is stable. SBP < 160, wean cardene as tolerated. Increase Lisinopril 40mg QD and add Norvasc 10 mg QD. Patient agitated and tachycardic, concerning for alcohol withdraw. Start Librium 50 mg q6h. Despite Librium dose, patient remains tachycardic and agitated- started phenobarb dosing. NA downtrending, 125 this morning. Start salt tabs 1g BID and free water restrict 1500 mL daily. Failed SLP BSE, recommended MBSS but patient was not appropriate for scan due to agitation.      Interval Events:  Patient received a total of 260mg IV      RENAL/FLUID/ELECTROLYTE:  Normal renal functioning  Chronic hyponatremia likely secondary to ETOH abuse  - BUN 8/ Creatinine 0.6  - Monitor I&Os; 1564/1550  - No maintenance IVF, passed swallow evluation  - Continue Salt tabs 1g BID  - Free water restriction 1500 mL daily  - Replace electrolytes PRN  - Daily BMP    GI/NUTRITION:  Dysphagia secondary to above  - Passed MBSS 1/17; minced and moist, nectar thick  - Monitor for signs/symptoms of aspiration  NUTRITION:  - No BM since admission  - Bowel regimen: Continue Colace  - GI prophylaxis: NI    ID:  No active infection  - Tmax 37.3C  - No leukocytosis, WBC 9.3  - Monitor off antibiotics   - Daily CBC    HEME:   - H&H 14.5/42.3  - Platelets 269  - Daily CBC    ENDOCRINE:  - Continue to monitor blood glucose, goal <180  - Hemoglobin A1c 5.5  - TSH 1.47    OTHER:  - PT/OT/ST   - PM&R consult  - UDS + Cocaine, Methadone; continue to  against use of recreational drugs     PROPHYLAXIS:  Stress ulcer: N/A    DVT PROPHYLAXIS:  - SCD sleeves - Thigh High   - Lovenox 40mg QD     DISPOSITION:  [x] To remain ICU for close neurological monitoring.    We will continue to follow along.     For any changes in exam or patient status please contact Neuro Critical Care.      JACOB Guardado - CNP  Neuro Critical Care  1/17/2024     7:15 AM

## 2024-01-17 NOTE — PROGRESS NOTES
Occupational Therapy  Facility/Department: [address] NEURO ICU  Occupational Therapy Initial Assessment    Name: Jagdeep Quintana Sr.  : 1970  MRN: [id number]  Date of Service: 2024    Discharge Recommendations:  Further therapy recommended at discharge.The patient should be able to tolerate at least 3 hours of therapy per day over 5 days or 15 hours over 7 days.   This patient may benefit from a Physical Medicine and Rehab consult.   OT Equipment Recommendations  Equipment Needed: Yes  Other: Continue to assess as pt's mobility progresses to ensure that equiptment reccomended is appropriate.       Patient Diagnosis(es): The primary encounter diagnosis was Intracranial hemorrhage (HCC). A diagnosis of Intraparenchymal hemorrhage of brain (HCC) was also pertinent to this visit.  Past Medical History:  has a past medical history of Hypertension.  Past Surgical History:  has a past surgical history that includes Finger surgery.       Chief Complaint   Patient presents with    Aphasia     LKW 1800    Extremity Weakness     RUE and RLE    Facial Droop     Right side           Assessment   Performance deficits / Impairments: Decreased functional mobility ;Decreased ADL status;Decreased endurance;Decreased high-level IADLs;Decreased ROM;Decreased strength;Decreased fine motor control;Decreased cognition;Decreased safe awareness;Decreased balance;Decreased coordination  Assessment: Pt required Min A x 2 for supine to sit transfer to EOB. Limitd d/t decreased cognition/aphasia and R side deficits. Pt tolerated ~15 minutes of sitting EOB with Mod-Max A d/t strong R and posterior lean. Attempted to reposition B UEs for improved balance; however, pt not able to follow directions to maintain placement of hands. Additionally, trialed 3x lateral leans to L to correct R side lean; however, only briefly pt was able to maintain Min A and quickly returned back to Mod - Max A. Pt did become agitated seated EOB with decreased  with Mod A and use of DME/adaptive strategies PRN  Short Term Goal 5: Participate in R UE A/AROM and strenghening for 10 minutes each visit to promote increased functional use throughout ADLs and to avoid contracture formation  Short Term Goal 6: Participate in 8 minutes of proprioceptive activity to RUE for promotion of increased functional use throughout ADLs  Short Term Goal 7: NOTIFY OTR/L WHEN GOALS ARE APPROPRIATE TO BE PROGRESSED       Therapy Time   Individual Concurrent Group Co-treatment   Time In 0830         Time Out 0855         Minutes 25         Timed Code Treatment Minutes: 8 Minutes       Rosalia Anderson, OTR/L

## 2024-01-17 NOTE — ACP (ADVANCE CARE PLANNING)
I spoke with patient's daughter at the bedside.  She confirms that patient is legally  and not currently .  She states he has a total of 4 children but one of them is under 18 years old.  The patient's oldest son is in retirement.  Patient has a significant other Esther who has been at the bedside and is the mother of patient's minor child but they are not .  Patient's adult children would be his next of kin/decision makers.      Daughter updated on patient status and plan of care.    Patient and Family Stroke Education    Patient and/or family Education discussed today:  Hemorrhagic stroke  hypertension, smoking, and illicit drug use  Stroke Education Topics: Testing:CT Head showed left basal ganglia intraparenchymal hemorrhage, subsequent repeat CT Heads have remained stable.  CTA Head/Neck with incidental finding of bilateral verterbal artery stenosis.  Patient would benefit from Aspirin and statin eventually and will follow up with Neuro Endovascular outpatient.    Changes in medication including Lisinopril increase.  Phenobarbital added for temporary control of alcohol withdraw symptoms.  Medication Compliance  Importance of Followup    Patient or family members expressed understanding on topics that were discussed and all their questions were answered.      JACOB Guardado - CNP  Neuro Critical Care  1/17/24 1:57 PM

## 2024-01-17 NOTE — PROGRESS NOTES
Physical Therapy  Facility/Department: [address] NEURO ICU  Physical Therapy Initial Assessment    Name: Jagdeep Quintana Sr.  : 1970  MRN: [id number]  Date of Service: 2024  Chief Complaint   Patient presents with    Aphasia     LKW 1800    Extremity Weakness     RUE and RLE    Facial Droop     Right side      Discharge Recommendations:    Further therapy recommended at discharge.The patient should be able to tolerate at least 3 hours of therapy per day over 5 days or 15 hours over 7 days.   This patient may benefit from a Physical Medicine and Rehab consult.    PT Equipment Recommendations  Equipment Needed: No      Patient Diagnosis(es): The primary encounter diagnosis was Intracranial hemorrhage (HCC). A diagnosis of Intraparenchymal hemorrhage of brain (HCC) was also pertinent to this visit.  Past Medical History:  has a past medical history of Hypertension.  Past Surgical History:  has a past surgical history that includes Finger surgery.    Assessment   Body Structures, Functions, Activity Limitations Requiring Skilled Therapeutic Intervention: Decreased functional mobility ;Decreased cognition;Decreased coordination;Decreased endurance;Decreased balance;Decreased strength;Decreased safe awareness  Assessment: Pt with mobility deficits requiring min-A x2 to perform bed mobility, assistance level varying from mod-a to max-A to maintain sitting at the EOB x~12 minutes.  Pt with significant posterior and R sided trunk lean in sitting at the EOB, demonstrates significant RUE strength and ROM deficits in addition to significant confusion, aphasia, and ataxia.  Pt would benefit from additional intense PT upon discharge to maximize functional independence.  Pt will require 24 hour assistance with mobility upon discharge.  Therapy Prognosis: Good  Decision Making: High Complexity  Requires PT Follow-Up: Yes  Activity Tolerance  Activity Tolerance: Patient limited by fatigue;Treatment limited secondary to      AM-PAC - Mobility    AM-PAC Basic Mobility - Inpatient   How much help is needed turning from your back to your side while in a flat bed without using bedrails?: A Lot  How much help is needed moving from lying on your back to sitting on the side of a flat bed without using bedrails?: A Lot  How much help is needed moving to and from a bed to a chair?: Total  How much help is needed standing up from a chair using your arms?: Total  How much help is needed walking in hospital room?: Total  How much help is needed climbing 3-5 steps with a railing?: Total  AM-PAC Inpatient Mobility Raw Score : 8  AM-PAC Inpatient T-Scale Score : 28.52  Mobility Inpatient CMS 0-100% Score: 86.62  Mobility Inpatient CMS G-Code Modifier : CM           Goals  Short Term Goals  Time Frame for Short Term Goals: 14 visits  Short Term Goal 1: Pt will perform sit<>stand transfer with min-A.  Short Term Goal 2: Pt will ambulate 60 feet with least restrictive and CGA.  Short Term Goal 3: Pt will demonstrate fair+ dynamic standing balance to decrease fall risk.  Short Term Goal 4: Pt will tolerate a 35 minute therapy session to promote increased endurance.  Short Term Goal 5: Pt will perform supine<>sit transfer with SBA.       Education  Patient Education  Education Given To: Patient  Education Provided: Role of Therapy;Plan of Care  Education Method: Verbal  Barriers to Learning: Cognition  Education Outcome: Continued education needed      Therapy Time   Individual Concurrent Group Co-treatment   Time In 0829         Time Out 0854         Minutes 25         Timed Code Treatment Minutes: 8 Minutes       Marshal Monreal PT

## 2024-01-17 NOTE — CONSULTS
Physical Medicine & Rehabilitation  Consult Note      Admitting Physician:   Aki Gilbert MD    Primary Care Provider:   Giovanna Martinez APRN - CNP     Reason for Consult:  Acute Inpatient Rehabilitation    Chief Complaint: R sided weakness    History of Present Illness:  Referring Provider is requesting an evaluation for appropriate placement upon discharge from acute care. History from chart review and staff.    Jagdeep Quintana Sr. is a 53 y.o. RHD male admitted to [facility] on 1/14/2024.      Patient admitted with R sided weakness for stroke alert with R facial droop, dysarthria, and aphasia. SBP >200 mm Hg on arrival to ED. Initial NIHSS 13. Diagnostic studies confirmed IPH L basal ganglia. He was started on Cardene drip for SBP < 140 mm Hg and admitted for neuro ICU management.     Neurosurgery following: managing conservatively    Review of Systems:  CHARLOTTE due to severe dysarthria      Premorbid function:  Independent    Current function:  Restrictions/ Precautions-  Restrictions/Precautions  Restrictions/Precautions: Seizure, Fall Risk  Required Braces or Orthoses?: No    PT:      Evaluation  Bed Mobility-  Bed mobility  Supine to Sit: Minimal assistance, 2 Person assistance (Assist for LE and trunk progression)  Sit to Supine: Maximum assistance, 2 Person assistance (Assist for LE and trunk progression. Required signficantly more help d/t pt fatiging, slight agitation and decreased alertness and response to writer's commands.)  Scooting: Moderate assistance  Bed Mobility Comments: HOB elevated ~35 degrees; Max cues for initiation/sequencing with fair return-limited d/t cognition     Transfers-        Ambulation-          OT:   ADLS-   ADL  Feeding: Verbal cueing, Setup, Increased time to complete, Moderate assistance  Grooming: Increased time to complete, Setup, Verbal cueing, Moderate assistance  Grooming Skilled Clinical Factors: Pt washed face with L hand at SBA for cues to initiate task. However, based on  clinical judgement pt is expected to require singifcant assist with grooming tasks requiring increased steps to sequence and use of B UEs.  UE Bathing: Increased time to complete, Maximum assistance, Setup, Verbal cueing  LE Bathing: Maximum assistance, Increased time to complete, Setup, Verbal cueing  UE Dressing: Maximum assistance, Increased time to complete, Setup, Verbal cueing  LE Dressing: Maximum assistance, Increased time to complete, Setup, Verbal cueing  Toileting: Maximum assistance, Adaptive equipment, Setup, Increased time to complete  Skin Care: Soap and water     SLP:  MODIFIED BARIUM SWALLOW STUDY 1/17/24  Impressions:  Patient presents with  safe swallow for Dysphagia Minced and Moist (Dysphagia II)  diet with Mildly Thick (Nectar)  liquids as evidenced by no observed aspiration noted with consistencies tested.  Pt with + silent aspiration noted with thin liquids. Pt with + swallow delay, increasing risk for aspiration if not seated upright at 90 degrees for all PO intake. Recommend small sips and bites, only feed when alert and awake and upright at 90 degrees for all PO intake.  Recommend close monitoring for overt/clinical s/s of aspiration and D/C PO intake and complete Modified Barium Swallow Study should they occur.  Results and recommendations reported to RN.         Patient Position: Lateral        Consistencies Administered: Pureed;Soft & Bite Sized;Mildly Thick straw;Thin straw        Dysphagia Outcome Severity Scale: Level 4: Mild moderate dysphagia- Intermittent supervision/cueing. One - two diet consistencies restricted  Penetration-Aspiration Scale (PAS): 8 - Material Enters the airway, passes below the vocal folds, and no effort is made to eject     Recommended Diet:  Solid consistency: Minced and Moist  Liquid consistency: Mildly Thick  Liquid administration via: Cup     Medication administration: Meds in puree    Past Medical History:        Diagnosis Date    Hypertension

## 2024-01-18 LAB
ANION GAP SERPL CALCULATED.3IONS-SCNC: 12 MMOL/L (ref 9–17)
BASOPHILS # BLD: 0.04 K/UL (ref 0–0.2)
BASOPHILS NFR BLD: 1 % (ref 0–2)
BUN SERPL-MCNC: 13 MG/DL (ref 6–20)
CALCIUM SERPL-MCNC: 8.7 MG/DL (ref 8.6–10.4)
CHLORIDE SERPL-SCNC: 96 MMOL/L (ref 98–107)
CO2 SERPL-SCNC: 19 MMOL/L (ref 20–31)
CREAT SERPL-MCNC: 0.7 MG/DL (ref 0.7–1.2)
EOSINOPHIL # BLD: 0.15 K/UL (ref 0–0.44)
EOSINOPHILS RELATIVE PERCENT: 2 % (ref 1–4)
ERYTHROCYTE [DISTWIDTH] IN BLOOD BY AUTOMATED COUNT: 12.3 % (ref 11.8–14.4)
GFR SERPL CREATININE-BSD FRML MDRD: >60 ML/MIN/1.73M2
GLUCOSE SERPL-MCNC: 86 MG/DL (ref 70–99)
HCT VFR BLD AUTO: 39.6 % (ref 40.7–50.3)
HGB BLD-MCNC: 13.7 G/DL (ref 13–17)
IMM GRANULOCYTES # BLD AUTO: <0.03 K/UL (ref 0–0.3)
IMM GRANULOCYTES NFR BLD: 0 %
LYMPHOCYTES NFR BLD: 1.46 K/UL (ref 1.1–3.7)
LYMPHOCYTES RELATIVE PERCENT: 21 % (ref 24–43)
MCH RBC QN AUTO: 34.2 PG (ref 25.2–33.5)
MCHC RBC AUTO-ENTMCNC: 34.6 G/DL (ref 28.4–34.8)
MCV RBC AUTO: 98.8 FL (ref 82.6–102.9)
MONOCYTES NFR BLD: 0.88 K/UL (ref 0.1–1.2)
MONOCYTES NFR BLD: 13 % (ref 3–12)
NEUTROPHILS NFR BLD: 63 % (ref 36–65)
NEUTS SEG NFR BLD: 4.35 K/UL (ref 1.5–8.1)
NRBC BLD-RTO: 0 PER 100 WBC
PLATELET # BLD AUTO: 281 K/UL (ref 138–453)
PMV BLD AUTO: 8.9 FL (ref 8.1–13.5)
POTASSIUM SERPL-SCNC: 4.4 MMOL/L (ref 3.7–5.3)
RBC # BLD AUTO: 4.01 M/UL (ref 4.21–5.77)
SODIUM SERPL-SCNC: 127 MMOL/L (ref 135–144)
WBC OTHER # BLD: 6.9 K/UL (ref 3.5–11.3)

## 2024-01-18 PROCEDURE — 2580000003 HC RX 258

## 2024-01-18 PROCEDURE — 6370000000 HC RX 637 (ALT 250 FOR IP): Performed by: NURSE PRACTITIONER

## 2024-01-18 PROCEDURE — 85025 COMPLETE CBC W/AUTO DIFF WBC: CPT

## 2024-01-18 PROCEDURE — 80048 BASIC METABOLIC PNL TOTAL CA: CPT

## 2024-01-18 PROCEDURE — 36415 COLL VENOUS BLD VENIPUNCTURE: CPT

## 2024-01-18 PROCEDURE — 99232 SBSQ HOSP IP/OBS MODERATE 35: CPT | Performed by: PSYCHIATRY & NEUROLOGY

## 2024-01-18 PROCEDURE — 99291 CRITICAL CARE FIRST HOUR: CPT | Performed by: STUDENT IN AN ORGANIZED HEALTH CARE EDUCATION/TRAINING PROGRAM

## 2024-01-18 PROCEDURE — 6370000000 HC RX 637 (ALT 250 FOR IP)

## 2024-01-18 PROCEDURE — 2060000000 HC ICU INTERMEDIATE R&B

## 2024-01-18 PROCEDURE — 92507 TX SP LANG VOICE COMM INDIV: CPT

## 2024-01-18 PROCEDURE — 92526 ORAL FUNCTION THERAPY: CPT

## 2024-01-18 PROCEDURE — 99223 1ST HOSP IP/OBS HIGH 75: CPT | Performed by: PSYCHIATRY & NEUROLOGY

## 2024-01-18 PROCEDURE — 6360000002 HC RX W HCPCS: Performed by: NURSE PRACTITIONER

## 2024-01-18 RX ORDER — SODIUM CHLORIDE 1 G/1
1 TABLET ORAL
Status: DISCONTINUED | OUTPATIENT
Start: 2024-01-18 | End: 2024-01-25

## 2024-01-18 RX ORDER — SENNA AND DOCUSATE SODIUM 50; 8.6 MG/1; MG/1
2 TABLET, FILM COATED ORAL DAILY
Status: DISCONTINUED | OUTPATIENT
Start: 2024-01-18 | End: 2024-01-26 | Stop reason: HOSPADM

## 2024-01-18 RX ORDER — POLYETHYLENE GLYCOL 3350 17 G/17G
17 POWDER, FOR SOLUTION ORAL DAILY
Status: DISCONTINUED | OUTPATIENT
Start: 2024-01-18 | End: 2024-01-26 | Stop reason: HOSPADM

## 2024-01-18 RX ADMIN — SODIUM CHLORIDE, PRESERVATIVE FREE 10 ML: 5 INJECTION INTRAVENOUS at 08:05

## 2024-01-18 RX ADMIN — SODIUM CHLORIDE TAB 1 GM 1 G: 1 TAB at 08:03

## 2024-01-18 RX ADMIN — DOCUSATE SODIUM 100 MG: 100 CAPSULE, LIQUID FILLED ORAL at 08:03

## 2024-01-18 RX ADMIN — SODIUM CHLORIDE, PRESERVATIVE FREE 10 ML: 5 INJECTION INTRAVENOUS at 20:41

## 2024-01-18 RX ADMIN — SODIUM CHLORIDE TAB 1 GM 1 G: 1 TAB at 16:09

## 2024-01-18 RX ADMIN — LISINOPRIL 40 MG: 20 TABLET ORAL at 08:03

## 2024-01-18 RX ADMIN — AMLODIPINE BESYLATE 10 MG: 10 TABLET ORAL at 08:03

## 2024-01-18 RX ADMIN — ENOXAPARIN SODIUM 40 MG: 100 INJECTION SUBCUTANEOUS at 08:03

## 2024-01-18 RX ADMIN — Medication 100 MG: at 08:03

## 2024-01-18 RX ADMIN — PHENOBARBITAL SODIUM 49 MG: 65 INJECTION INTRAMUSCULAR; INTRAVENOUS at 08:03

## 2024-01-18 RX ADMIN — FOLIC ACID 1 MG: 1 TABLET ORAL at 08:03

## 2024-01-18 ASSESSMENT — PAIN SCALES - GENERAL: PAINLEVEL_OUTOF10: 0

## 2024-01-18 NOTE — PROGRESS NOTES
SPEECH LANGUAGE PATHOLOGY  Speech Language Pathology  [facility] NEURO ICU    Speech Language/Dysphagia Treatment Note    Date: 2024  Patient’s Name: Jagdeep Quintana Sr.  MRN: [id number]  Diagnosis:   Patient Active Problem List   Diagnosis Code    Essential hypertension I10    Chronic shoulder pain M25.519, G89.29    Acute low back pain M54.50    Cervical pain M54.2    Neck sprain S13.9XXA    Lumbar sprain S33.5XXA    DDD (degenerative disc disease), cervical M50.30    DDD (degenerative disc disease), lumbar M51.36    Smoker F17.200    Acquired spondylolisthesis M43.10    Intraparenchymal hemorrhage of brain (HCC) I61.9    Intracranial hemorrhage (HCC) I62.9    Hyponatremia E87.1    Alcohol withdrawal syndrome, with delirium ([facility]) F10.931       Pain: no c/o pain    Speech and Language/Dysphagia Treatment  Treatment time: 2699-3129    Subjective: [x] Alert [x] Cooperative     [] Confused     [] Agitated    [] Lethargic      Objective/Assessment:  Auditory Comprehension: Yes/no (comparison): 75% (I) improved to 95% with mod A/repeated or rephrased stimuli.  Y/N (simple): 80% (I), improved to 90% with mod A/repeated or rephrased stimuli.     Verbal Expression: Responsive namin% (I), improved to 80% with mod cues.     Speech: Pt presents with mod-severe dysarthria characterized by imprecise articulation due to decreased orofacial strength/coordination/ROM (R side impairment > L). Improved intelligibility noted with cues to repeat/over-articulate.    Voice: Pt demo increased vocal intensity, requiring occasional cues to increase loudness.     Dysphagia: ST presented trial of soft and bite sized texture. Pt demo improved oral clearance, no residue observed. Latent cough in 2/8 trials. Pt responded to min verbal cues from ST to complete oral/pharyngeal/laryngeal exercises x20 reps x 2 sets and Dee swallow x2 x 4 sets to strengthen swallowing mechanism. Pt became notably fatigued as exercises progressed.     Other:

## 2024-01-18 NOTE — PROGRESS NOTES
Daily Progress Note  Neuro Critical Care    Patient Name: Jagdeep Quintana Sr.  Patient : 1970  Room/Bed: [facility]/0120-01  Code Status: Full  Allergies: No Known Allergies    CHIEF COMPLAINT:     Right sided weakness, aphasia     INTERVAL HISTORY    Initial Presentation (Admitted 1/15/24):  The patient is a 54 yo male with history of HTN, alcoholism, and polysubstance abuse who presents with acute onset right sided weakness and aphasia while at home with his brother. LKW 6pm. Patient presented to the ER with right sided facial weakness, right sided upper and lower extremity weakness. CT head showed left basal ganglia hemorrhage. Initial -210. Patient was started on Cardene infusion to maintain SBP < 140. CTA head/neck unremarkable for vascular abnormality, showed severe stenosis right VA, occlusion at the origin of left VA, and moderate stenosis L PCA. Patient admitted to neuro ICU for close monitoring.    Of note, patient lives at home with his girlfriend. Is independent as baseline. Family states he is a heavy drinker but recently decreased his alcohol intake, believe he drinks on average 4-40oz beers daily. Girlfriend states she believes he uses illicit drugs but is unsure what kind. UDS positive for Cocaine. Family believes he is compliant with antihypertensives, although pharmacy verified last refill was 2 years ago.     Hospital Course:  : Repeat CT head this morning is stable. SBP < 160, wean cardene as tolerated. Increase Lisinopril 40mg QD and add Norvasc 10 mg QD. Patient agitated and tachycardic, concerning for alcohol withdraw. Start Librium 50 mg q6h. Despite Librium dose, patient remains tachycardic and agitated- started phenobarb dosing. NA downtrending, 125 this morning. Start salt tabs 1g BID and free water restrict 1500 mL daily. Failed SLP BSE, recommended MBSS but patient was not appropriate for scan due to agitation.    : Patient received a total of 260mg IV Phenobarb

## 2024-01-18 NOTE — CARE COORDINATION
Called Sandra at [facility] to see if they have review the referral.  She tells me that she had not received it.  Resent referral.  Awaiting return call from [facility] about acceptance.     1215 Sandra from [facility] calls and accepts patient, asked to please start precert.  She has agreed  Called Gladys patients daughter she is agreeable to the plan.

## 2024-01-18 NOTE — H&P
[facility] Neurology   IN-PATIENT SERVICE   [facility]    HISTORY AND PHYSICAL EXAMINATION            Date:   1/18/2024  Patient name:  Jagdeep Quintana Sr.  Date of admission:  1/14/2024  6:26 PM  MRN:   [id number]  Account:  [id number]  YOB: 1970  PCP:    Giovanna Martinez APRN - CNP  Room:   [address] [place] [address]  Code Status:    Full Code    Chief Complaint:     Chief Complaint   Patient presents with    Aphasia     LKW 1800    Extremity Weakness     RUE and RLE    Facial Droop     Right side     History Obtained From:     family member -girlfriend, electronic medical record    History of Present Illness:     The patient is a 53 y.o.  Non- / non  male who presents with Aphasia (LKW 1800), Extremity Weakness (RUE and RLE), and Facial Droop (Right side)   and he is admitted to the hospital for the management of left basilar IPH  PMH significant for HTN, alcohol and polysubstance abuse.  Patient was brought in by EMS on 1/15 with complaints of right-sided muscle weakness, facial droop and dysarthria.  Upon arrival to ER he was found to be significantly hypertensive with SBP> 200.  Stat CT head was obtained and showed IPH left basal ganglia.  He was admitted under NICU for further management.  UDS came back positive for cocaine and methadone.  He initially was started on Cardene drip for the management of hypertension but subsequently was transitioned to oral therapy with lisinopril and Norvasc.  Control CT head exam after admission showed no significant change in size.  Over his course in NICU patient has had symptoms of alcohol withdrawal that appears to be subsiding this time.  Close the phenobarbital was given on 1/18 at 8 am        Past Medical History:     Past Medical History:   Diagnosis Date    Hypertension         Past Surgical History:     Past Surgical History:   Procedure Laterality Date    FINGER SURGERY          Medications Prior to Admission:     Prior to Admission

## 2024-01-18 NOTE — PROGRESS NOTES
some effort against gravity, limb cannot get to or maintain (if cued) 90 (or 45) degrees, drifts down to bed, but has some effort against gravity    6a. Motor left le - no drift; leg holds 30 degree position for full 5 seconds   6b  Motor right le - drift; leg falls by the end of the 5 second period but does not hit bed   7. Limb Ataxia: 0 - absent   8.  Sensory: 1 - mild to moderate sensory loss; patient feels pinprick is less sharp or is dull on the affected side; there is a loss of superficial pain with pinprick but patient is aware of being touched    9. Best Language:  0 - no aphasia, normal   10. Dysarthria: 2 - severe; patient speech is so slurred as to be unintelligible in the absence of or our of proportion to any dysphagia, or is mute/anarthric    11. Extinction and Inattention: 0 - no abnormality         Total:   7     MRS: 4      LABS:   Reviewed.  Lab Results   Component Value Date    HGB 13.7 2024    WBC 6.9 2024     2024     (L) 2024    BUN 13 2024    CREATININE 0.7 2024    AST 24 2020    ALT 23 2020    APTT 27.8 2024    INR 0.9 2024      No results found for: \"COVID19\"    RADIOLOGY:   Images were personally reviewed including:    CTA head neck 2024: chronic left VA occlusion      R VA origin severe stenosis          ASSESSMENT:     54 yo male with history of HTN, alcoholism, and polysubstance abuse who presents with acute onset aphasia and right sided weakness. Initial -210. CT head showed acute left basal ganglia IPH, ICH score 0. 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     PLAN:     - no endovascular intervention for now  - aspirin 81mg when possible  - out patient follow up of R VA origin severe stenosis    - f/u with Dr. Estrella in 3 months    Case discussed with Dr. Estrella attending.    Jay Jay Archer MD PGY 8  Stroke, Neurocritical Care & Neurointervention  [facility]  [facility]  [facility]  [facility]  Electronically signed 1/18/2024 at 9:35 AM

## 2024-01-19 ENCOUNTER — APPOINTMENT (OUTPATIENT)
Dept: MRI IMAGING | Age: 54
End: 2024-01-19
Payer: COMMERCIAL

## 2024-01-19 PROBLEM — F10.10 ETOH ABUSE: Status: ACTIVE | Noted: 2024-01-19

## 2024-01-19 PROBLEM — F19.10 POLYSUBSTANCE ABUSE (HCC): Status: ACTIVE | Noted: 2024-01-19

## 2024-01-19 LAB
ANION GAP SERPL CALCULATED.3IONS-SCNC: 8 MMOL/L (ref 9–17)
BASOPHILS # BLD: 0.03 K/UL (ref 0–0.2)
BASOPHILS NFR BLD: 0 % (ref 0–2)
BUN SERPL-MCNC: 12 MG/DL (ref 6–20)
CALCIUM SERPL-MCNC: 9.2 MG/DL (ref 8.6–10.4)
CHLORIDE SERPL-SCNC: 96 MMOL/L (ref 98–107)
CO2 SERPL-SCNC: 25 MMOL/L (ref 20–31)
CREAT SERPL-MCNC: 0.7 MG/DL (ref 0.7–1.2)
EOSINOPHIL # BLD: 0.12 K/UL (ref 0–0.44)
EOSINOPHILS RELATIVE PERCENT: 2 % (ref 1–4)
ERYTHROCYTE [DISTWIDTH] IN BLOOD BY AUTOMATED COUNT: 12 % (ref 11.8–14.4)
GFR SERPL CREATININE-BSD FRML MDRD: >60 ML/MIN/1.73M2
GLUCOSE SERPL-MCNC: 113 MG/DL (ref 70–99)
HCT VFR BLD AUTO: 42.9 % (ref 40.7–50.3)
HGB BLD-MCNC: 15.2 G/DL (ref 13–17)
IMM GRANULOCYTES # BLD AUTO: <0.03 K/UL (ref 0–0.3)
IMM GRANULOCYTES NFR BLD: 0 %
LYMPHOCYTES NFR BLD: 1.34 K/UL (ref 1.1–3.7)
LYMPHOCYTES RELATIVE PERCENT: 19 % (ref 24–43)
MCH RBC QN AUTO: 34.2 PG (ref 25.2–33.5)
MCHC RBC AUTO-ENTMCNC: 35.4 G/DL (ref 28.4–34.8)
MCV RBC AUTO: 96.4 FL (ref 82.6–102.9)
MONOCYTES NFR BLD: 0.91 K/UL (ref 0.1–1.2)
MONOCYTES NFR BLD: 13 % (ref 3–12)
NEUTROPHILS NFR BLD: 66 % (ref 36–65)
NEUTS SEG NFR BLD: 4.64 K/UL (ref 1.5–8.1)
NRBC BLD-RTO: 0 PER 100 WBC
PLATELET # BLD AUTO: 291 K/UL (ref 138–453)
PMV BLD AUTO: 8.7 FL (ref 8.1–13.5)
POTASSIUM SERPL-SCNC: 4.1 MMOL/L (ref 3.7–5.3)
RBC # BLD AUTO: 4.45 M/UL (ref 4.21–5.77)
SODIUM SERPL-SCNC: 129 MMOL/L (ref 135–144)
WBC OTHER # BLD: 7.1 K/UL (ref 3.5–11.3)

## 2024-01-19 PROCEDURE — 97530 THERAPEUTIC ACTIVITIES: CPT

## 2024-01-19 PROCEDURE — 97116 GAIT TRAINING THERAPY: CPT

## 2024-01-19 PROCEDURE — 2580000003 HC RX 258

## 2024-01-19 PROCEDURE — 70553 MRI BRAIN STEM W/O & W/DYE: CPT

## 2024-01-19 PROCEDURE — 6370000000 HC RX 637 (ALT 250 FOR IP): Performed by: NURSE PRACTITIONER

## 2024-01-19 PROCEDURE — 2060000000 HC ICU INTERMEDIATE R&B

## 2024-01-19 PROCEDURE — 85025 COMPLETE CBC W/AUTO DIFF WBC: CPT

## 2024-01-19 PROCEDURE — 99232 SBSQ HOSP IP/OBS MODERATE 35: CPT | Performed by: PSYCHIATRY & NEUROLOGY

## 2024-01-19 PROCEDURE — A9579 GAD-BASE MR CONTRAST NOS,1ML: HCPCS

## 2024-01-19 PROCEDURE — 92507 TX SP LANG VOICE COMM INDIV: CPT

## 2024-01-19 PROCEDURE — 6370000000 HC RX 637 (ALT 250 FOR IP)

## 2024-01-19 PROCEDURE — 6360000004 HC RX CONTRAST MEDICATION

## 2024-01-19 PROCEDURE — 97535 SELF CARE MNGMENT TRAINING: CPT

## 2024-01-19 PROCEDURE — 36415 COLL VENOUS BLD VENIPUNCTURE: CPT

## 2024-01-19 PROCEDURE — 6360000002 HC RX W HCPCS: Performed by: NURSE PRACTITIONER

## 2024-01-19 PROCEDURE — 97110 THERAPEUTIC EXERCISES: CPT

## 2024-01-19 PROCEDURE — 80048 BASIC METABOLIC PNL TOTAL CA: CPT

## 2024-01-19 PROCEDURE — 99233 SBSQ HOSP IP/OBS HIGH 50: CPT | Performed by: PSYCHIATRY & NEUROLOGY

## 2024-01-19 RX ORDER — SODIUM CHLORIDE 0.9 % (FLUSH) 0.9 %
10 SYRINGE (ML) INJECTION PRN
Status: DISCONTINUED | OUTPATIENT
Start: 2024-01-19 | End: 2024-01-26 | Stop reason: HOSPADM

## 2024-01-19 RX ADMIN — SENNOSIDES AND DOCUSATE SODIUM 2 TABLET: 50; 8.6 TABLET ORAL at 09:24

## 2024-01-19 RX ADMIN — SODIUM CHLORIDE TAB 1 GM 1 G: 1 TAB at 09:09

## 2024-01-19 RX ADMIN — SODIUM CHLORIDE, PRESERVATIVE FREE 10 ML: 5 INJECTION INTRAVENOUS at 17:19

## 2024-01-19 RX ADMIN — LISINOPRIL 40 MG: 20 TABLET ORAL at 09:24

## 2024-01-19 RX ADMIN — Medication 100 MG: at 09:24

## 2024-01-19 RX ADMIN — SODIUM CHLORIDE TAB 1 GM 1 G: 1 TAB at 16:01

## 2024-01-19 RX ADMIN — AMLODIPINE BESYLATE 10 MG: 10 TABLET ORAL at 09:24

## 2024-01-19 RX ADMIN — FOLIC ACID 1 MG: 1 TABLET ORAL at 09:25

## 2024-01-19 RX ADMIN — SODIUM CHLORIDE, PRESERVATIVE FREE 10 ML: 5 INJECTION INTRAVENOUS at 20:14

## 2024-01-19 RX ADMIN — ENOXAPARIN SODIUM 40 MG: 100 INJECTION SUBCUTANEOUS at 09:24

## 2024-01-19 RX ADMIN — POLYETHYLENE GLYCOL 3350 17 G: 17 POWDER, FOR SOLUTION ORAL at 09:24

## 2024-01-19 RX ADMIN — GADOTERIDOL 14 ML: 279.3 INJECTION, SOLUTION INTRAVENOUS at 17:18

## 2024-01-19 RX ADMIN — SODIUM CHLORIDE, PRESERVATIVE FREE 10 ML: 5 INJECTION INTRAVENOUS at 09:37

## 2024-01-19 RX ADMIN — SODIUM CHLORIDE TAB 1 GM 1 G: 1 TAB at 12:11

## 2024-01-19 ASSESSMENT — PAIN SCALES - GENERAL
PAINLEVEL_OUTOF10: 0
PAINLEVEL_OUTOF10: 0

## 2024-01-19 NOTE — PROGRESS NOTES
Advance Care Planning     Advance Care Planning Inpatient Note  Spiritual Care Department    Today's Date: 1/19/2024  Unit: STVZ 1C STEPDOWN    Received request from Other: Spiritual Care Consults .  Upon review of chart and communication with care team, patient's decision making abilities are not in question.. Patient was/were present in the room during visit.    Goals of ACP Conversation:  Discuss advance care planning documents    Health Care Decision Makers:     No healthcare decision makers have been documented.  Click here to complete HealthCare Decision Makers including selection of the Healthcare Decision Maker Relationship (ie \"Primary\")  Summary:  No Decision Maker named by patient at this time    Advance Care Planning Documents (Patient Wishes):  None     Assessment:   received request from consults for power of  documents. Pt was eating lunch and asked  to come back later.  returned around 3:30 p.m. Pt declined again at this time and ask  to try again tomorrow.     Interventions:  Patient DECLINED ACP conversation    Care Preferences Communicated:   No    Outcomes/Plan:  No ACP documents completed at this time.     Electronically signed by Chaplain Marlo Intern on 1/19/2024 at 4:20 PM         01/19/24 1611   Encounter Summary   Encounter Overview/Reason  Advance Care Planning   Service Provided For: Patient   Referral/Consult From: Multi-disciplinary team   Support System Family members   Last Encounter  01/19/24   Complexity of Encounter Low   Begin Time 1340   End Time  1530   Total Time Calculated 110 min   Advance Care Planning   Type Refused ACP conversation   Assessment/Intervention/Outcome   Assessment Calm   Intervention Active listening;Sustaining Presence/Ministry of presence   Outcome Engaged in conversation     Electronically signed by Shirley Caba on 1/19/2024 at 4:22 PM

## 2024-01-19 NOTE — PROGRESS NOTES
Occupational Therapy  Facility/Department: [address] STEPDOWN  Occupational Therapy Daily Treatment Note    Name: Jagdeep Quintana Sr.  : 1970  MRN: [id number]  Date of Service: 2024    Discharge Recommendations:  Patient would benefit from continued therapy after discharge  OT Equipment Recommendations  Other: Continue to assess as pt's mobility progresses to ensure that equiptment reccomended is appropriate.    Patient Diagnosis(es): The primary encounter diagnosis was Intracranial hemorrhage (HCC). A diagnosis of Intraparenchymal hemorrhage of brain (HCC) was also pertinent to this visit.  Past Medical History:  has a past medical history of Hypertension.  Past Surgical History:  has a past surgical history that includes Finger surgery.    Assessment   Performance deficits / Impairments: Decreased functional mobility ;Decreased ADL status;Decreased endurance;Decreased high-level IADLs;Decreased ROM;Decreased strength;Decreased fine motor control;Decreased cognition;Decreased safe awareness;Decreased balance;Decreased coordination  Assessment: Further therapy recommended at discharge.The patient should be able to tolerate at least 3 hours of therapy per day over 5 days or 15 hours over 7 days.   This patient may benefit from a Physical Medicine and Rehab consult.    Prognosis: Good  REQUIRES OT FOLLOW-UP: Yes  Activity Tolerance  Activity Tolerance: Patient Tolerated treatment well;Patient limited by fatigue        Plan   Occupational Therapy Plan  Times Per Week: 4-5x/wk  Current Treatment Recommendations: Strengthening, ROM, Balance training, Functional mobility training, Endurance training, Safety education & training, Patient/Caregiver education & training, Equipment evaluation, education, & procurement, Self-Care / ADL, Cognitive/Perceptual training, Positioning     Restrictions  Restrictions/Precautions  Restrictions/Precautions: Seizure, Fall Risk  Required Braces or Orthoses?: No  Position Activity  transfers/mobility, grooming task, and retired to recliner with all needs met and call light within reach, left with nursing staff. Pt demos ability to cross midline with BUEs and BLEs during functional tasks (grooming, R sock don/doff), pt also demos ability to rotate neck L/R and visually track L/R with ease. Demos slurred speech this date and somewhat difficult to understand. Pt declined further ADL care this date d/t wanting to wait later in day/personal preference. Pt demos unsteadiness when standing and R sided weakness/FM coordination deficits impacting ability to participate in occupations.     Activity Tolerance  Activity Tolerance: Patient limited by endurance;Patient limited by fatigue  Activity Tolerance Comments: R side weakness    Bed mobility  Supine to Sit: Contact guard assistance  Sit to Supine: Unable to assess (Retired to recliner)  Scooting: Contact guard assistance  Bed Mobility Comments: HOB elevated, increased time/effort, able to perform with CGA.    Cognition  Overall Cognitive Status: Exceptions  Arousal/Alertness: Appropriate responses to stimuli  Following Commands: Follows one step commands with repetition;Follows one step commands with increased time  Attention Span: Difficulty attending to directions;Attends with cues to redirect  Memory: Decreased recall of recent events  Safety Judgement: Decreased awareness of need for assistance;Decreased awareness of need for safety  Problem Solving: Decreased awareness of errors;Assistance required to identify errors made;Assistance required to correct errors made;Assistance required to generate solutions;Assistance required to implement solutions  Insights: Decreased awareness of deficits  Initiation: Requires cues for some  Sequencing: Requires cues for some  Cognition Comment: Pt demos poor safety awareness and impulsivity as evidenced by attempting to sit suddenly during functional mobility before therapist skilled instruction/readiness.

## 2024-01-19 NOTE — PROGRESS NOTES
Endovascular Neurosurgery Progress Note    SUBJECTIVE:     Still very dysarthric and right arm leg is getting much stronger, girlfriend at bedside    Review of Systems:  CONSTITUTIONAL:  negative for fevers, chills, fatigue and malaise    EYES:  negative for double vision, blurred vision and photophobia     HEENT:  negative for tinnitus, epistaxis and sore throat    RESPIRATORY:  negative for cough, shortness of breath, wheezing    CARDIOVASCULAR:  negative for chest pain, palpitations, syncope, edema    GASTROINTESTINAL:  negative for nausea, vomiting    GENITOURINARY:  negative for incontinence    MUSCULOSKELETAL:  negative for neck or back pain    NEUROLOGICAL:  Negative for weakness and tingling  negative for headaches and dizziness    PSYCHIATRIC:  negative for anxiety      Review of systems otherwise negative.      OBJECTIVE:     Vitals:    01/19/24 0838   BP: (!) 155/98   Pulse: 59   Resp: 15   Temp: 98.1 °F (36.7 °C)   SpO2: 90%        General:  Gen: normal habitus, NAD  HEENT: NCAT, mucosa moist  Cvs: RRR, S1 S2 normal  Resp: symmetric unlabored breathing  Abd: s/nd/nt  Ext: no edema  Skin: no lesions seen, warm and dry    Neuro:  Gen: awake and alert, oriented x3.   Lang/speech: no aphasia, very dysarthric. Follows all commands.  CN: PERRL, EOMI, VFF, V1-3 intact, face symmetric, hearing intact, shoulder shrug symmetric, tongue midline  Motor: grossly 5/5 UE and LE on the left, right arm 4/5, right leg 4/5 (no drift)   Sense: LT intact in all 4 ext.  Coord: FTN and HTS intact b/l  DTR: deferred  Gait: deferred    NIH Stroke Scale:   1a  Level of consciousness: 0 - alert; keenly responsive   1b. LOC questions:  0 - answers both questions correctly   1c. LOC commands: 0 - performs both tasks correctly   2.  Best Gaze: 0 - normal   3. Visual: 0 - no visual loss   4. Facial Palsy: 1 - minor paralysis (flattened nasolabial fold, asymmetric on smiling)   5a. Motor left arm: 0 - no drift, limb holds 90 (or  Lázaro  Electronically signed 1/19/2024 at 9:37 AM

## 2024-01-19 NOTE — CARE COORDINATION
Transitional Planning  Called Sadnra at [facility], BARI left on secure mail requesting update on auth.    1254 Received a call from Sandra at [facility], will start pre cert today.  No beds until Monday

## 2024-01-19 NOTE — PROGRESS NOTES
right arm  Tone: symmetric b/l arms and legs  Abnormal movements: No abnormal movements or tremor   Sensory function Diminished sensation over the right side of the body   Cerebellar No dysmetria or dysdiadochocinesia    Reflex function DTR:        2+ b/l symmetric in biceps, brachioradialis, patellar, calcaneal  Babinski b/l plantar downgoing   Gait                  Not assessed       Investigations:      Laboratory Testing:  No results found for this or any previous visit (from the past 24 hour(s)).  Recent Labs     01/18/24  0410   WBC 6.9   RBC 4.01*   HGB 13.7   HCT 39.6*   MCV 98.8   MCH 34.2*   MCHC 34.6   RDW 12.3      MPV 8.9     Recent Labs     01/18/24  0410   *   K 4.4   CL 96*   CO2 19*   BUN 13   CREATININE 0.7   GLUCOSE 86   CALCIUM 8.7     Hemoglobin A1C   Date Value Ref Range Status   01/15/2024 5.5 4.0 - 6.0 % Final       Assessment :      Primary Problem  Intraparenchymal hemorrhage of brain (HCC)    Active Hospital Problems    Diagnosis Date Noted    Polysubstance abuse (HCC) [F19.10] 01/19/2024    ETOH abuse [F10.10] 01/19/2024    Alcohol withdrawal syndrome, with delirium (HCC) [F10.931] 01/17/2024    Hyponatremia [E87.1] 01/16/2024    Intracranial hemorrhage (HCC) [I62.9] 01/15/2024    Intraparenchymal hemorrhage of brain (HCC) [I61.9] 01/14/2024    Uncontrolled hypertension [I10] 10/20/2015       Case of 53 y.o. M with PMH of HTN, alcohol and polysubstance abuse who presented with severe dysarthria, right-sided facial droop and weakness.  CT head revealed IPH left basal ganglia region.  SBP on admission> 200.  UDS came back positive for cocaine and methadone.      Plan:       Hypertensive left basal ganglia IPH in the setting of cocaine use  -CTA head and neck.  Negative for vascular abnormality  - Repeat CT head 1/16 stable  - Neurosurgery signed off, recommended outpatient follow-up  - Get MRI brain W WO contrast to rule out underlying tumor  - Keep SBP<160     Asymptomatic

## 2024-01-19 NOTE — PROGRESS NOTES
Physical Therapy  Facility/Department: [address] STEPDOWN  Physical Therapy Daily Treatment Note    Name: Jagdeep Quintana Sr.  : 1970  MRN: [id number]  Date of Service: 2024    Discharge Recommendations:  Patient would benefit from continued therapy after discharge   PT Equipment Recommendations  Equipment Needed: No      Patient Diagnosis(es): The primary encounter diagnosis was Intracranial hemorrhage (HCC). A diagnosis of Intraparenchymal hemorrhage of brain (HCC) was also pertinent to this visit.  Past Medical History:  has a past medical history of Hypertension.  Past Surgical History:  has a past surgical history that includes Finger surgery.    Assessment   Body Structures, Functions, Activity Limitations Requiring Skilled Therapeutic Intervention: Decreased functional mobility ;Decreased cognition;Decreased coordination;Decreased endurance;Decreased balance;Decreased strength;Decreased safe awareness  Assessment: Pt able to perform bed mobility and CGA, and required Ursula to perform STS transfers with RW. Pt ambulated 20ft x2 with RW requiring Ursula x2 for safety, and an additional 100ft x2 with RW progressing to Ursula with a chair follow. Pt most limited by R side weakness and decreased balance and endurance. Recommending continued PT to address deficits and maximize safety and independence with mobility. He is currently a fall risk with decreased awareness of deficits and would be unsafe to return to prior living arrangements at discharge.  Therapy Prognosis: Good  Requires PT Follow-Up: Yes  Activity Tolerance  Activity Tolerance: Patient limited by endurance;Patient limited by fatigue  Activity Tolerance Comments: R side weakness     Plan   Physical Therapy Plan  General Plan: 6-7 times per week  Current Treatment Recommendations: Strengthening, ROM, Balance training, Functional mobility training, Transfer training, Gait training, Safety education & training, Home exercise program, Therapeutic  made;Assistance required to generate solutions;Assistance required to implement solutions  Insights: Decreased awareness of deficits  Initiation: Requires cues for some  Sequencing: Requires cues for some  Cognition Comment: Pt demos poor safety awareness and impulsivity as evidenced by attempting to sit sudddenly during functional mobility before therapist skilled instruction/readiness. Required verbal  cues throughout for safety with fair return.     Objective   Bed mobility  Supine to Sit: Contact guard assistance  Sit to Supine: Unable to assess (Pt retired to seated in chair at end of session.)  Scooting: Contact guard assistance  Bed Mobility Comments: HOB elevated, increased time/effort, able to perform with CGA.  Transfers  Sit to Stand: Minimal Assistance  Stand to Sit: Minimal Assistance  Comment: RW used for transfers, pt performed x1 from EOB, x2 from chair with cueing required for correct hand placement throughout transfers, poor return demonstrated. Pt required Ursula for all transfers.  Ambulation  Surface: Level tile  Device: Rolling Walker  Assistance: Minimal assistance;2 Person assistance  Quality of Gait: ataxic, fair stability, decreased  strength on R side. R side weakness, buckling.  Gait Deviations: Slow Clare;Decreased step length;Decreased step height  Distance: 5ft to chair, seated rest, 20ft x2  Comments: Pt ambulated to chair, then took a seated rest break. Pt then ambulated into bathroom and stood at sink to perform ADLs, then ambulated back to chair. Pt fatigued and attempting to rush when walking back to chair, max cueing for safety and required physical assistance with safe RW management.  More Ambulation?: Yes  Ambulation 2  Surface - 2: level tile  Device 2: Rolling Walker  Other Apparatus 2: Wheelchair follow  Assistance 2: Minimal assistance  Quality of Gait 2: fair stability, R side weakness, decreased R  strength on RW, ataxic, poor safety awareness.  Gait Deviations:

## 2024-01-19 NOTE — PROGRESS NOTES
Speech Language Pathology  [facility]    Speech/Language Treatment Note    Date: 1/19/2024  Patient’s Name: Jagdeep Quintana Sr.  MRN: [id number]  Diagnosis:   Patient Active Problem List   Diagnosis Code    Uncontrolled hypertension I10    Chronic shoulder pain M25.519, G89.29    Acute low back pain M54.50    Cervical pain M54.2    Neck sprain S13.9XXA    Lumbar sprain S33.5XXA    DDD (degenerative disc disease), cervical M50.30    DDD (degenerative disc disease), lumbar M51.36    Smoker F17.200    Acquired spondylolisthesis M43.10    Intraparenchymal hemorrhage of brain (HCC) I61.9    Intracranial hemorrhage ([facility]) I62.9    Hyponatremia E87.1    Alcohol withdrawal syndrome, with delirium ([facility]) F10.931    Polysubstance abuse ([facility]) F19.10    ETOH abuse F10.10       Pain: 0/10    Cognitive Treatment    Treatment time: 13:48 - 14:13      Subjective: [x] Alert [x] Cooperative     [] Confused     [] Agitated    [] Lethargic      Objective/Assessment:    Orientation: Pt. Was oriented to name, date of birth, and current month with a min verbal cue. Pt. Was not oriented to location, or current year.     Speech: Pt. Seen for O/M treatment program for dysarthria.  Pt. Completed O/M exercises X 10 X 1 set with mod verbal cues, and models.  Education provided re: compensatory strategies to increase speech intelligibility/clarity.  Pt. Verbalized understanding.  Exercise program left at bedside.     Three-syllable words: 17/17 given min verbal cues to slow down and utilize the provided communication strategies, as well as a model.    Other: Pt. Demonstrated full engagement throughout the entirety of the session. Note pt. Did express fatigue while completing the facial exercise program.    Dysphagia: Pt finishing meal during ST's session. Note pt with several instances of immediate/delayed wet cough with puree. Sister noted pt always has a cough due to COPD. Sister reported pt's distaste for Mildly Thick (Nectar)

## 2024-01-20 PROBLEM — I65.02 VERTEBRAL ARTERY OCCLUSION, LEFT: Status: ACTIVE | Noted: 2024-01-20

## 2024-01-20 PROBLEM — I65.01: Status: ACTIVE | Noted: 2024-01-20

## 2024-01-20 LAB
ANION GAP SERPL CALCULATED.3IONS-SCNC: 11 MMOL/L (ref 9–17)
BASOPHILS # BLD: 0.05 K/UL (ref 0–0.2)
BASOPHILS NFR BLD: 1 % (ref 0–2)
BUN SERPL-MCNC: 13 MG/DL (ref 6–20)
CALCIUM SERPL-MCNC: 8.8 MG/DL (ref 8.6–10.4)
CHLORIDE SERPL-SCNC: 95 MMOL/L (ref 98–107)
CO2 SERPL-SCNC: 22 MMOL/L (ref 20–31)
CREAT SERPL-MCNC: 0.7 MG/DL (ref 0.7–1.2)
EOSINOPHIL # BLD: 0.27 K/UL (ref 0–0.44)
EOSINOPHILS RELATIVE PERCENT: 4 % (ref 1–4)
ERYTHROCYTE [DISTWIDTH] IN BLOOD BY AUTOMATED COUNT: 11.9 % (ref 11.8–14.4)
GFR SERPL CREATININE-BSD FRML MDRD: >60 ML/MIN/1.73M2
GLUCOSE SERPL-MCNC: 89 MG/DL (ref 70–99)
HCT VFR BLD AUTO: 40.1 % (ref 40.7–50.3)
HGB BLD-MCNC: 13.7 G/DL (ref 13–17)
IMM GRANULOCYTES # BLD AUTO: 0.03 K/UL (ref 0–0.3)
IMM GRANULOCYTES NFR BLD: 0 %
LYMPHOCYTES NFR BLD: 1.5 K/UL (ref 1.1–3.7)
LYMPHOCYTES RELATIVE PERCENT: 21 % (ref 24–43)
MCH RBC QN AUTO: 32.9 PG (ref 25.2–33.5)
MCHC RBC AUTO-ENTMCNC: 34.2 G/DL (ref 28.4–34.8)
MCV RBC AUTO: 96.4 FL (ref 82.6–102.9)
MONOCYTES NFR BLD: 0.92 K/UL (ref 0.1–1.2)
MONOCYTES NFR BLD: 13 % (ref 3–12)
NEUTROPHILS NFR BLD: 61 % (ref 36–65)
NEUTS SEG NFR BLD: 4.37 K/UL (ref 1.5–8.1)
NRBC BLD-RTO: 0 PER 100 WBC
PLATELET # BLD AUTO: 296 K/UL (ref 138–453)
PMV BLD AUTO: 8.9 FL (ref 8.1–13.5)
POTASSIUM SERPL-SCNC: 4.3 MMOL/L (ref 3.7–5.3)
RBC # BLD AUTO: 4.16 M/UL (ref 4.21–5.77)
SODIUM SERPL-SCNC: 128 MMOL/L (ref 135–144)
WBC OTHER # BLD: 7.1 K/UL (ref 3.5–11.3)

## 2024-01-20 PROCEDURE — 80048 BASIC METABOLIC PNL TOTAL CA: CPT

## 2024-01-20 PROCEDURE — 6370000000 HC RX 637 (ALT 250 FOR IP): Performed by: NURSE PRACTITIONER

## 2024-01-20 PROCEDURE — 2580000003 HC RX 258

## 2024-01-20 PROCEDURE — 85025 COMPLETE CBC W/AUTO DIFF WBC: CPT

## 2024-01-20 PROCEDURE — 36415 COLL VENOUS BLD VENIPUNCTURE: CPT

## 2024-01-20 PROCEDURE — 2060000000 HC ICU INTERMEDIATE R&B

## 2024-01-20 PROCEDURE — 6360000002 HC RX W HCPCS: Performed by: NURSE PRACTITIONER

## 2024-01-20 PROCEDURE — 6370000000 HC RX 637 (ALT 250 FOR IP)

## 2024-01-20 PROCEDURE — 99232 SBSQ HOSP IP/OBS MODERATE 35: CPT | Performed by: PSYCHIATRY & NEUROLOGY

## 2024-01-20 PROCEDURE — 99233 SBSQ HOSP IP/OBS HIGH 50: CPT | Performed by: PSYCHIATRY & NEUROLOGY

## 2024-01-20 RX ADMIN — SENNOSIDES AND DOCUSATE SODIUM 2 TABLET: 50; 8.6 TABLET ORAL at 09:54

## 2024-01-20 RX ADMIN — SODIUM CHLORIDE TAB 1 GM 1 G: 1 TAB at 09:56

## 2024-01-20 RX ADMIN — Medication 100 MG: at 09:54

## 2024-01-20 RX ADMIN — SODIUM CHLORIDE, PRESERVATIVE FREE 10 ML: 5 INJECTION INTRAVENOUS at 09:57

## 2024-01-20 RX ADMIN — FOLIC ACID 1 MG: 1 TABLET ORAL at 09:54

## 2024-01-20 RX ADMIN — SODIUM CHLORIDE TAB 1 GM 1 G: 1 TAB at 13:12

## 2024-01-20 RX ADMIN — POLYETHYLENE GLYCOL 3350 17 G: 17 POWDER, FOR SOLUTION ORAL at 09:55

## 2024-01-20 RX ADMIN — LISINOPRIL 40 MG: 20 TABLET ORAL at 09:54

## 2024-01-20 RX ADMIN — AMLODIPINE BESYLATE 10 MG: 10 TABLET ORAL at 09:54

## 2024-01-20 RX ADMIN — SODIUM CHLORIDE, PRESERVATIVE FREE 10 ML: 5 INJECTION INTRAVENOUS at 22:33

## 2024-01-20 RX ADMIN — SODIUM CHLORIDE TAB 1 GM 1 G: 1 TAB at 16:43

## 2024-01-20 RX ADMIN — ENOXAPARIN SODIUM 40 MG: 100 INJECTION SUBCUTANEOUS at 09:55

## 2024-01-20 ASSESSMENT — PAIN SCALES - GENERAL: PAINLEVEL_OUTOF10: 0

## 2024-01-20 NOTE — PROGRESS NOTES
Advance Care Planning     Advance Care Planning Inpatient Note  [facility] Department    Today's Date: 1/20/2024  Unit: STVZ 1C STEPDOWN    Received request from HealthCare Provider.  Upon review of chart and communication with care team, patient's decision making abilities are not in question.. Patient was present in the room during visit.    Goals of ACP Conversation:  Discuss advance care planning documents    Health Care Decision Makers:       Summary:  No Decision Maker named by patient at this time    Advance Care Planning Documents (Patient Wishes):  None     Assessment:  Upon entering the room, writer observes pt sitting up in bed eating breakfast. Pt was open to  visit.  was following up with ACP conversation per pt request from 1/19/24 to come back the next day. Pt said they are declining documents and do not want to fill them out.  will remove request from consult list.     Interventions:  Patient DECLINED ACP conversation    Care Preferences Communicated:   No    Outcomes/Plan:  Patient declines ACP documents at this time. No further needs requested by pt.     Electronically signed by Chaplain Marlo Intern on 1/20/2024 at 2:11 PM           01/20/24 1410   Encounter Summary   Encounter Overview/Reason  Advance Care Planning;Follow-up   Service Provided For: Patient   Referral/Consult From: Multi-disciplinary team   Support System Family members   Last Encounter  01/20/24   Complexity of Encounter Low   Begin Time 1030   End Time  1035   Total Time Calculated 5 min   Advance Care Planning   Type Refused ACP conversation   Assessment/Intervention/Outcome   Assessment Calm   Intervention Active listening;Sustaining Presence/Ministry of presence   Outcome Engaged in conversation;Expressed Gratitude;Refused/Declined     Electronically signed by Shirley Caba on 1/20/2024 at 2:15 PM

## 2024-01-20 NOTE — PROGRESS NOTES
Endovascular Neurosurgery Progress Note    SUBJECTIVE:     Speech improving    Eating breakfast     No overnight event    Review of Systems:  CONSTITUTIONAL:  negative for fevers, chills, fatigue and malaise    EYES:  negative for double vision, blurred vision and photophobia     HEENT:  negative for tinnitus, epistaxis and sore throat    RESPIRATORY:  negative for cough, shortness of breath, wheezing    CARDIOVASCULAR:  negative for chest pain, palpitations, syncope, edema    GASTROINTESTINAL:  negative for nausea, vomiting    GENITOURINARY:  negative for incontinence    MUSCULOSKELETAL:  negative for neck or back pain    NEUROLOGICAL:  Negative for weakness and tingling  negative for headaches and dizziness    PSYCHIATRIC:  negative for anxiety      Review of systems otherwise negative.      OBJECTIVE:     Vitals:    01/20/24 0400   BP: 138/70   Pulse: 68   Resp: 22   Temp: 97.8 °F (36.6 °C)   SpO2: 95%        General:  Gen: normal habitus, NAD  HEENT: NCAT, mucosa moist  Cvs: RRR, S1 S2 normal  Resp: symmetric unlabored breathing  Abd: s/nd/nt  Ext: no edema  Skin: no lesions seen, warm and dry    Neuro:  Gen: awake and alert, oriented x3.   Lang/speech: no aphasia, very dysarthric. Follows all commands.  CN: PERRL, EOMI, VFF, V1-3 intact, face symmetric, hearing intact, shoulder shrug symmetric, tongue midline  Motor: grossly 5/5 UE and LE on the left, right arm 4/5, right leg 4/5 (no drift)   Sense: LT intact in all 4 ext.  Coord: FTN and HTS intact b/l  DTR: deferred  Gait: deferred    NIH Stroke Scale:   1a  Level of consciousness: 0 - alert; keenly responsive   1b. LOC questions:  0 - answers both questions correctly   1c. LOC commands: 0 - performs both tasks correctly   2.  Best Gaze: 0 - normal   3. Visual: 0 - no visual loss   4. Facial Palsy: 1 - minor paralysis (flattened nasolabial fold, asymmetric on smiling)   5a. Motor left arm: 0 - no drift, limb holds 90 (or 45) degrees for full 10 seconds    5b.  Motor right arm: 0   6a. Motor left le - no drift; leg holds 30 degree position for full 5 seconds   6b  Motor right le - no drift; leg holds 30 degree position for full 5 seconds   7. Limb Ataxia: 0 - absent   8.  Sensory: 1 - mild to moderate sensory loss; patient feels pinprick is less sharp or is dull on the affected side; there is a loss of superficial pain with pinprick but patient is aware of being touched    9. Best Language:  0 - no aphasia, normal   10. Dysarthria: 2 - severe; patient speech is so slurred as to be unintelligible in the absence of or our of proportion to any dysphagia, or is mute/anarthric    11. Extinction and Inattention: 0 - no abnormality         Total:   4     MRS: 3      LABS:   Reviewed.  Lab Results   Component Value Date    HGB 13.7 2024    WBC 7.1 2024     2024     (L) 2024    BUN 13 2024    CREATININE 0.7 2024    AST 24 2020    ALT 23 2020    APTT 27.8 2024    INR 0.9 2024      No results found for: \"COVID19\"    RADIOLOGY:   Images were personally reviewed including:    CTA head neck 2024: chronic left VA occlusion      R VA origin severe stenosis          ASSESSMENT:     52 yo male with history of HTN, alcoholism, and polysubstance abuse who presents with acute onset aphasia and right sided weakness. Initial -210. CT head showed acute left basal ganglia IPH, ICH score 0. 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     PLAN:     - no endovascular intervention for now  - aspirin 81mg when possible  - out patient follow up of R VA origin severe stenosis    - f/u with Dr. Estrella in 3 months    Case discussed with Dr. Estrella attending.    Jay Jay Archer MD PGY 8  Stroke, Neurocritical Care & Neurointervention  [facility]  [facility] Stroke [facility] [place]  Electronically signed

## 2024-01-20 NOTE — PROGRESS NOTES
cocaine)  Stroke Education Topics: Medication Compliance  Importance of Followup     Patient or family members expressed understanding on topics that were discussed and all their questions were answered.    Follow-up further recommendations after discussing case with the attending.  The plan was discussed with the patient, patient's family and the medical staff.   Consultations:   IP CONSULT TO NEUROSURGERY  IP CONSULT TO NEUROCRITICAL CARE  IP CONSULT TO ENDOVASCULAR NEUROSURGERY  IP CONSULT TO PHYSICAL MEDICINE REHAB  IP CONSULT TO SPIRITUAL SERVICES    Patient is admitted as inpatient status because of co-morbidities listed above, severity of signs and symptoms as outlined, requirement for current medical therapies and most importantly because of direct risk to patient if care not provided in a hospital setting.    Tonia Brooks MD  Neurology Resident PGY-2   1/20/2024  4:33 PM    Copy sent to Giovanna Carvalho, APRN - CNP

## 2024-01-21 LAB
ANION GAP SERPL CALCULATED.3IONS-SCNC: 10 MMOL/L (ref 9–17)
BASOPHILS # BLD: 0.05 K/UL (ref 0–0.2)
BASOPHILS NFR BLD: 1 % (ref 0–2)
BUN SERPL-MCNC: 15 MG/DL (ref 6–20)
CALCIUM SERPL-MCNC: 8.8 MG/DL (ref 8.6–10.4)
CHLORIDE SERPL-SCNC: 97 MMOL/L (ref 98–107)
CO2 SERPL-SCNC: 23 MMOL/L (ref 20–31)
CREAT SERPL-MCNC: 0.7 MG/DL (ref 0.7–1.2)
EOSINOPHIL # BLD: 0.32 K/UL (ref 0–0.44)
EOSINOPHILS RELATIVE PERCENT: 4 % (ref 1–4)
ERYTHROCYTE [DISTWIDTH] IN BLOOD BY AUTOMATED COUNT: 12 % (ref 11.8–14.4)
GFR SERPL CREATININE-BSD FRML MDRD: >60 ML/MIN/1.73M2
GLUCOSE SERPL-MCNC: 89 MG/DL (ref 70–99)
HCT VFR BLD AUTO: 40.9 % (ref 40.7–50.3)
HGB BLD-MCNC: 14 G/DL (ref 13–17)
IMM GRANULOCYTES # BLD AUTO: <0.03 K/UL (ref 0–0.3)
IMM GRANULOCYTES NFR BLD: 0 %
LYMPHOCYTES NFR BLD: 1.27 K/UL (ref 1.1–3.7)
LYMPHOCYTES RELATIVE PERCENT: 17 % (ref 24–43)
MCH RBC QN AUTO: 33.7 PG (ref 25.2–33.5)
MCHC RBC AUTO-ENTMCNC: 34.2 G/DL (ref 28.4–34.8)
MCV RBC AUTO: 98.6 FL (ref 82.6–102.9)
MONOCYTES NFR BLD: 0.87 K/UL (ref 0.1–1.2)
MONOCYTES NFR BLD: 11 % (ref 3–12)
NEUTROPHILS NFR BLD: 67 % (ref 36–65)
NEUTS SEG NFR BLD: 5.08 K/UL (ref 1.5–8.1)
NRBC BLD-RTO: 0 PER 100 WBC
PLATELET # BLD AUTO: 311 K/UL (ref 138–453)
PMV BLD AUTO: 9.1 FL (ref 8.1–13.5)
POTASSIUM SERPL-SCNC: 4.8 MMOL/L (ref 3.7–5.3)
RBC # BLD AUTO: 4.15 M/UL (ref 4.21–5.77)
SODIUM SERPL-SCNC: 130 MMOL/L (ref 135–144)
WBC OTHER # BLD: 7.6 K/UL (ref 3.5–11.3)

## 2024-01-21 PROCEDURE — 85025 COMPLETE CBC W/AUTO DIFF WBC: CPT

## 2024-01-21 PROCEDURE — 6360000002 HC RX W HCPCS: Performed by: NURSE PRACTITIONER

## 2024-01-21 PROCEDURE — 6370000000 HC RX 637 (ALT 250 FOR IP)

## 2024-01-21 PROCEDURE — 99231 SBSQ HOSP IP/OBS SF/LOW 25: CPT | Performed by: PSYCHIATRY & NEUROLOGY

## 2024-01-21 PROCEDURE — 36415 COLL VENOUS BLD VENIPUNCTURE: CPT

## 2024-01-21 PROCEDURE — 2060000000 HC ICU INTERMEDIATE R&B

## 2024-01-21 PROCEDURE — 2580000003 HC RX 258

## 2024-01-21 PROCEDURE — 6370000000 HC RX 637 (ALT 250 FOR IP): Performed by: NURSE PRACTITIONER

## 2024-01-21 PROCEDURE — 99232 SBSQ HOSP IP/OBS MODERATE 35: CPT | Performed by: PSYCHIATRY & NEUROLOGY

## 2024-01-21 PROCEDURE — 80048 BASIC METABOLIC PNL TOTAL CA: CPT

## 2024-01-21 RX ADMIN — SODIUM CHLORIDE, PRESERVATIVE FREE 10 ML: 5 INJECTION INTRAVENOUS at 19:44

## 2024-01-21 RX ADMIN — FOLIC ACID 1 MG: 1 TABLET ORAL at 09:14

## 2024-01-21 RX ADMIN — Medication 100 MG: at 09:13

## 2024-01-21 RX ADMIN — AMLODIPINE BESYLATE 10 MG: 10 TABLET ORAL at 09:23

## 2024-01-21 RX ADMIN — SODIUM CHLORIDE, PRESERVATIVE FREE 10 ML: 5 INJECTION INTRAVENOUS at 09:25

## 2024-01-21 RX ADMIN — LISINOPRIL 40 MG: 20 TABLET ORAL at 09:23

## 2024-01-21 RX ADMIN — SODIUM CHLORIDE TAB 1 GM 1 G: 1 TAB at 09:14

## 2024-01-21 RX ADMIN — ENOXAPARIN SODIUM 40 MG: 100 INJECTION SUBCUTANEOUS at 09:23

## 2024-01-21 RX ADMIN — SENNOSIDES AND DOCUSATE SODIUM 2 TABLET: 50; 8.6 TABLET ORAL at 09:13

## 2024-01-21 NOTE — PLAN OF CARE
Problem: Discharge Planning  Goal: Discharge to home or other facility with appropriate resources  Outcome: Progressing     Problem: Safety - Adult  Goal: Free from fall injury  Outcome: Progressing  Flowsheets (Taken 1/20/2024 1956)  Free From Fall Injury: Instruct family/caregiver on patient safety     Problem: Pain  Goal: Verbalizes/displays adequate comfort level or baseline comfort level  Outcome: Progressing     Problem: Skin/Tissue Integrity  Goal: Absence of new skin breakdown  Description: 1.  Monitor for areas of redness and/or skin breakdown  2.  Assess vascular access sites hourly  3.  Every 4-6 hours minimum:  Change oxygen saturation probe site  4.  Every 4-6 hours:  If on nasal continuous positive airway pressure, respiratory therapy assess nares and determine need for appliance change or resting period.  Outcome: Progressing     Problem: ABCDS Injury Assessment  Goal: Absence of physical injury  Outcome: Progressing

## 2024-01-21 NOTE — PROGRESS NOTES
[facility] Neurology   IN-PATIENT SERVICE   [facility]    Progress Note             Date:   1/21/2024  Patient name:  Jagdeep Quintana Sr.  Date of admission:  1/14/2024  6:26 PM  MRN:   [id number]  Account:  [id number]  YOB: 1970  PCP:    Giovanna Martinez APRN - CNP  Room:   [address] [place] [address]  Code Status:    Full Code    Chief Complaint:     Chief Complaint   Patient presents with    Aphasia     LKW 1800    Extremity Weakness     RUE and RLE    Facial Droop     Right side       Interval hx:     The patient was seen and examined at bedside. Is vitally stable, alert and oriented x 3. No acute events overnight.  MRI brain resulted showed - subacute left putamen hematoma as described above with intrinsic T1 hyperintensity and faint peripheral enhancement without nodularity favored to  represent expected breakdown of the blood-brain barrier given the subacute age of the hematoma.  Dysarthria continues to improve  Awaiting placement to acute rehab      Brief History of Present Illness:     The patient is a 53 y.o.  Non- / non  male who presents with Aphasia (LKW 1800), Extremity Weakness (RUE and RLE), and Facial Droop (Right side)   and he is admitted to the hospital for the management of left basilar IPH  PMH significant for HTN, alcohol and polysubstance abuse.  Patient was brought in by EMS on 1/15 with complaints of right-sided muscle weakness, facial droop and dysarthria.  Upon arrival to ER he was found to be significantly hypertensive with SBP> 200.  Stat CT head was obtained and showed IPH left basal ganglia.  He was admitted under NICU for further management.  UDS came back positive for cocaine and methadone.  He initially was started on Cardene drip for the management of hypertension but subsequently was transitioned to oral therapy with lisinopril and Norvasc.  Control CT head the next day after admission showed no significant change in size.  Over his course in NICU  hemorrhagic stroke   hypertension, smoking, and polysubstance abuse (including cocaine)  Stroke Education Topics: Medication Compliance  Importance of Followup     Patient or family members expressed understanding on topics that were discussed and all their questions were answered.    Follow-up further recommendations after discussing case with the attending.  The plan was discussed with the patient, patient's family and the medical staff.   Consultations:   IP CONSULT TO NEUROSURGERY  IP CONSULT TO NEUROCRITICAL CARE  IP CONSULT TO ENDOVASCULAR NEUROSURGERY  IP CONSULT TO PHYSICAL MEDICINE REHAB  IP CONSULT TO SPIRITUAL SERVICES    Patient is admitted as inpatient status because of co-morbidities listed above, severity of signs and symptoms as outlined, requirement for current medical therapies and most importantly because of direct risk to patient if care not provided in a hospital setting.    Tonia Brooks MD  Neurology Resident PGY-2   1/21/2024  5:39 PM    Copy sent to Giovanna Carvalho, APRN - CNP

## 2024-01-21 NOTE — PLAN OF CARE
Problem: Discharge Planning  Goal: Discharge to home or other facility with appropriate resources  1/21/2024 1120 by Jf Cheney RN  Outcome: Progressing  1/21/2024 1119 by Jf Cheney RN  Outcome: Progressing  1/21/2024 0553 by Neli Sexton RN  Outcome: Progressing     Problem: Safety - Adult  Goal: Free from fall injury  1/21/2024 1120 by Jf Cheney RN  Outcome: Progressing  1/21/2024 1119 by Jf Cheney RN  Outcome: Progressing  1/21/2024 0553 by Neli Sexton RN  Outcome: Progressing  Flowsheets (Taken 1/20/2024 1956)  Free From Fall Injury: Instruct family/caregiver on patient safety     Problem: Pain  Goal: Verbalizes/displays adequate comfort level or baseline comfort level  1/21/2024 1120 by Jf Cheney RN  Outcome: Progressing  1/21/2024 1119 by Jf Cheney RN  Outcome: Progressing  1/21/2024 0553 by Neli Sexton RN  Outcome: Progressing     Problem: Skin/Tissue Integrity  Goal: Absence of new skin breakdown  Description: 1.  Monitor for areas of redness and/or skin breakdown  2.  Assess vascular access sites hourly  3.  Every 4-6 hours minimum:  Change oxygen saturation probe site  4.  Every 4-6 hours:  If on nasal continuous positive airway pressure, respiratory therapy assess nares and determine need for appliance change or resting period.  1/21/2024 1120 by Jf Cheney RN  Outcome: Progressing  1/21/2024 1119 by Jf Cheney RN  Outcome: Progressing  1/21/2024 0553 by Neli Sexton RN  Outcome: Progressing     Problem: ABCDS Injury Assessment  Goal: Absence of physical injury  1/21/2024 1120 by Jf Cheney RN  Outcome: Progressing  1/21/2024 1119 by Jf Cheney RN  Outcome: Progressing  1/21/2024 0553 by Neli Sexton RN  Outcome: Progressing

## 2024-01-21 NOTE — PROGRESS NOTES
Endovascular Neurosurgery Progress Note    SUBJECTIVE:     Walking around the [address] with a walker with PT    No overnight event    Review of Systems:  CONSTITUTIONAL:  negative for fevers, chills, fatigue and malaise    EYES:  negative for double vision, blurred vision and photophobia     HEENT:  negative for tinnitus, epistaxis and sore throat    RESPIRATORY:  negative for cough, shortness of breath, wheezing    CARDIOVASCULAR:  negative for chest pain, palpitations, syncope, edema    GASTROINTESTINAL:  negative for nausea, vomiting    GENITOURINARY:  negative for incontinence    MUSCULOSKELETAL:  negative for neck or back pain    NEUROLOGICAL:  Negative for weakness and tingling  negative for headaches and dizziness    PSYCHIATRIC:  negative for anxiety      Review of systems otherwise negative.      OBJECTIVE:     Vitals:    01/21/24 0930   BP: (!) 136/100   Pulse: 84   Resp: 15   Temp: 98 °F (36.7 °C)   SpO2: 99%        General:  Gen: normal habitus, NAD  HEENT: NCAT, mucosa moist  Cvs: RRR, S1 S2 normal  Resp: symmetric unlabored breathing  Abd: s/nd/nt  Ext: no edema  Skin: no lesions seen, warm and dry    Neuro:  Gen: awake and alert, oriented x3.   Lang/speech: no aphasia, very dysarthric. Follows all commands.  CN: PERRL, EOMI, VFF, V1-3 intact, face symmetric, hearing intact, shoulder shrug symmetric, tongue midline  Motor: grossly 5/5 UE and LE on the left, right arm 4+/5, right leg 4+/5 (no drift)   Sense: LT intact in all 4 ext.  Coord: FTN and HTS intact b/l  DTR: deferred  Gait: normal with a walker    NIH Stroke Scale:   1a  Level of consciousness: 0 - alert; keenly responsive   1b. LOC questions:  0 - answers both questions correctly   1c. LOC commands: 0 - performs both tasks correctly   2.  Best Gaze: 0 - normal   3. Visual: 0 - no visual loss   4. Facial Palsy: 1 - minor paralysis (flattened nasolabial fold, asymmetric on smiling)   5a. Motor left arm: 0 - no drift, limb holds 90 (or 45)

## 2024-01-21 NOTE — PLAN OF CARE
Problem: Discharge Planning  Goal: Discharge to home or other facility with appropriate resources  1/21/2024 1119 by Jf Cheney RN  Outcome: Progressing  1/21/2024 0553 by Neli Sexton RN  Outcome: Progressing     Problem: Safety - Adult  Goal: Free from fall injury  1/21/2024 1119 by Jf Cheney RN  Outcome: Progressing  1/21/2024 0553 by Neli Sexton RN  Outcome: Progressing  Flowsheets (Taken 1/20/2024 1956)  Free From Fall Injury: Instruct family/caregiver on patient safety     Problem: Pain  Goal: Verbalizes/displays adequate comfort level or baseline comfort level  1/21/2024 1119 by Jf Cheney RN  Outcome: Progressing  1/21/2024 0553 by Neli Sexton RN  Outcome: Progressing     Problem: Skin/Tissue Integrity  Goal: Absence of new skin breakdown  Description: 1.  Monitor for areas of redness and/or skin breakdown  2.  Assess vascular access sites hourly  3.  Every 4-6 hours minimum:  Change oxygen saturation probe site  4.  Every 4-6 hours:  If on nasal continuous positive airway pressure, respiratory therapy assess nares and determine need for appliance change or resting period.  1/21/2024 1119 by Jf Cheney RN  Outcome: Progressing  1/21/2024 0553 by Neli Sexton RN  Outcome: Progressing     Problem: ABCDS Injury Assessment  Goal: Absence of physical injury  1/21/2024 1119 by Jf Cheney RN  Outcome: Progressing  1/21/2024 0553 by Neli Sexton RN  Outcome: Progressing

## 2024-01-22 LAB
ANION GAP SERPL CALCULATED.3IONS-SCNC: 11 MMOL/L (ref 9–16)
BASOPHILS # BLD: 0.05 K/UL (ref 0–0.2)
BASOPHILS NFR BLD: 1 % (ref 0–2)
BUN SERPL-MCNC: 16 MG/DL (ref 6–20)
CALCIUM SERPL-MCNC: 8.7 MG/DL (ref 8.6–10.4)
CHLORIDE SERPL-SCNC: 99 MMOL/L (ref 98–107)
CO2 SERPL-SCNC: 21 MMOL/L (ref 20–31)
CREAT SERPL-MCNC: 0.7 MG/DL (ref 0.7–1.2)
EOSINOPHIL # BLD: 0.32 K/UL (ref 0–0.44)
EOSINOPHILS RELATIVE PERCENT: 4 % (ref 1–4)
ERYTHROCYTE [DISTWIDTH] IN BLOOD BY AUTOMATED COUNT: 12.2 % (ref 11.8–14.4)
GFR SERPL CREATININE-BSD FRML MDRD: >60 ML/MIN/1.73M2
GLUCOSE SERPL-MCNC: 94 MG/DL (ref 74–99)
HCT VFR BLD AUTO: 40.3 % (ref 40.7–50.3)
HGB BLD-MCNC: 13.9 G/DL (ref 13–17)
IMM GRANULOCYTES # BLD AUTO: 0.03 K/UL (ref 0–0.3)
IMM GRANULOCYTES NFR BLD: 0 %
LYMPHOCYTES NFR BLD: 1.16 K/UL (ref 1.1–3.7)
LYMPHOCYTES RELATIVE PERCENT: 16 % (ref 24–43)
MCH RBC QN AUTO: 33.8 PG (ref 25.2–33.5)
MCHC RBC AUTO-ENTMCNC: 34.5 G/DL (ref 28.4–34.8)
MCV RBC AUTO: 98.1 FL (ref 82.6–102.9)
MONOCYTES NFR BLD: 0.85 K/UL (ref 0.1–1.2)
MONOCYTES NFR BLD: 12 % (ref 3–12)
NEUTROPHILS NFR BLD: 67 % (ref 36–65)
NEUTS SEG NFR BLD: 4.94 K/UL (ref 1.5–8.1)
NRBC BLD-RTO: 0 PER 100 WBC
PLATELET # BLD AUTO: 293 K/UL (ref 138–453)
PMV BLD AUTO: 8.7 FL (ref 8.1–13.5)
POTASSIUM SERPL-SCNC: 4.5 MMOL/L (ref 3.7–5.3)
RBC # BLD AUTO: 4.11 M/UL (ref 4.21–5.77)
SODIUM SERPL-SCNC: 131 MMOL/L (ref 136–145)
WBC OTHER # BLD: 7.4 K/UL (ref 3.5–11.3)

## 2024-01-22 PROCEDURE — 80048 BASIC METABOLIC PNL TOTAL CA: CPT

## 2024-01-22 PROCEDURE — 92526 ORAL FUNCTION THERAPY: CPT

## 2024-01-22 PROCEDURE — 97116 GAIT TRAINING THERAPY: CPT

## 2024-01-22 PROCEDURE — 99232 SBSQ HOSP IP/OBS MODERATE 35: CPT | Performed by: PSYCHIATRY & NEUROLOGY

## 2024-01-22 PROCEDURE — 6360000002 HC RX W HCPCS: Performed by: NURSE PRACTITIONER

## 2024-01-22 PROCEDURE — 6370000000 HC RX 637 (ALT 250 FOR IP): Performed by: NURSE PRACTITIONER

## 2024-01-22 PROCEDURE — 92507 TX SP LANG VOICE COMM INDIV: CPT

## 2024-01-22 PROCEDURE — 2580000003 HC RX 258

## 2024-01-22 PROCEDURE — 97110 THERAPEUTIC EXERCISES: CPT

## 2024-01-22 PROCEDURE — 85025 COMPLETE CBC W/AUTO DIFF WBC: CPT

## 2024-01-22 PROCEDURE — 2060000000 HC ICU INTERMEDIATE R&B

## 2024-01-22 PROCEDURE — 36415 COLL VENOUS BLD VENIPUNCTURE: CPT

## 2024-01-22 PROCEDURE — 97535 SELF CARE MNGMENT TRAINING: CPT

## 2024-01-22 RX ADMIN — LISINOPRIL 40 MG: 20 TABLET ORAL at 09:50

## 2024-01-22 RX ADMIN — SODIUM CHLORIDE, PRESERVATIVE FREE 10 ML: 5 INJECTION INTRAVENOUS at 09:51

## 2024-01-22 RX ADMIN — Medication 100 MG: at 09:50

## 2024-01-22 RX ADMIN — FOLIC ACID 1 MG: 1 TABLET ORAL at 09:50

## 2024-01-22 RX ADMIN — SODIUM CHLORIDE, PRESERVATIVE FREE 10 ML: 5 INJECTION INTRAVENOUS at 23:42

## 2024-01-22 RX ADMIN — SENNOSIDES AND DOCUSATE SODIUM 2 TABLET: 50; 8.6 TABLET ORAL at 09:49

## 2024-01-22 RX ADMIN — ENOXAPARIN SODIUM 40 MG: 100 INJECTION SUBCUTANEOUS at 09:50

## 2024-01-22 RX ADMIN — AMLODIPINE BESYLATE 10 MG: 10 TABLET ORAL at 09:50

## 2024-01-22 ASSESSMENT — ENCOUNTER SYMPTOMS: APNEA: 0

## 2024-01-22 NOTE — PLAN OF CARE
Problem: Discharge Planning  Goal: Discharge to home or other facility with appropriate resources  Outcome: Progressing  Flowsheets (Taken 1/21/2024 1942)  Discharge to home or other facility with appropriate resources:   Identify barriers to discharge with patient and caregiver   Arrange for needed discharge resources and transportation as appropriate     Problem: Safety - Adult  Goal: Free from fall injury  Outcome: Progressing  Flowsheets (Taken 1/21/2024 1925)  Free From Fall Injury: Instruct family/caregiver on patient safety     Problem: Skin/Tissue Integrity  Goal: Absence of new skin breakdown  Description: 1.  Monitor for areas of redness and/or skin breakdown  2.  Assess vascular access sites hourly  3.  Every 4-6 hours minimum:  Change oxygen saturation probe site  4.  Every 4-6 hours:  If on nasal continuous positive airway pressure, respiratory therapy assess nares and determine need for appliance change or resting period.  Outcome: Progressing     Problem: ABCDS Injury Assessment  Goal: Absence of physical injury  Outcome: Progressing  Flowsheets (Taken 1/21/2024 1925)  Absence of Physical Injury: Implement safety measures based on patient assessment

## 2024-01-22 NOTE — PROGRESS NOTES
Occupational Therapy  Facility/Department: [address] STEPDOWN  Occupational Therapy Daily Treatment Note    Name: Jagdeep Quintana Sr.  : 1970  MRN: [id number]  Date of Service: 2024  Chief Complaint   Patient presents with    Aphasia     LKW 1800    Extremity Weakness     RUE and RLE    Facial Droop     Right side       Discharge Recommendations:  Patient would benefit from continued therapy after discharge  OT Equipment Recommendations  Other: Continue to assess as pt's mobility progresses to ensure that equiptment reccomended is appropriate.       Patient Diagnosis(es): The primary encounter diagnosis was Intracranial hemorrhage (HCC). A diagnosis of Intraparenchymal hemorrhage of brain (HCC) was also pertinent to this visit.  Past Medical History:  has a past medical history of Hypertension.  Past Surgical History:  has a past surgical history that includes Finger surgery.           Assessment   Performance deficits / Impairments: Decreased functional mobility ;Decreased ADL status;Decreased endurance;Decreased high-level IADLs;Decreased ROM;Decreased strength;Decreased fine motor control;Decreased cognition;Decreased safe awareness;Decreased balance;Decreased coordination  Assessment: RN ok'd OT tx this date. Pt began session supine in bed. Pt impulsively sat unsupported EOB without writer instruction. Pt demo'd washing feet and donning clean socks while seated with MIN A from therapist to thread socks over toes. Pt engaging in STS transfer and func mob around room with CGA for static standing and MIN A for dynamic standing d/t decreased balance. Pt standing at sink engaging in oral hygiene, UB bathing and dressing with CGA - MIN A. Pt with decreased safety awareness, R FM and coordination, RUE functional reach, and aphasia this date. Pt would continue to benefit from OT services to address deficits listed above and improve overall functional performance prior to discharge.  Prognosis: Good  REQUIRES OT  A Little  How much help is needed for putting on and taking off regular upper body clothing?: A Little  How much help is needed for taking care of personal grooming?: A Little  How much help for eating meals?: A Little  AM-PAC Inpatient Daily Activity Raw Score: 18  AM-PAC Inpatient ADL T-Scale Score : 38.66  ADL Inpatient CMS 0-100% Score: 46.65  ADL Inpatient CMS G-Code Modifier : CK         Goals  Short Term Goals  Time Frame for Short Term Goals: By discharge, pt will:  Short Term Goal 1: Demo safe functional transfers with SBA, LRD PRN and <2 cues for initiation/sequencing to promote increased engagement ADL activity (updated by Vika RODRIGUEZR/L on 01/22/2024)  Short Term Goal 2: Demo 10 minutes of dynamic standing tolerance with CGA  to promote increased independence throughout ADLs (updated by Vika RODRIGUEZR/L on 01/22/2024)  Short Term Goal 3: Demo UB ADLs with SBA and use of DME/adaptive strategies PRN (updated by Vika RODRIGUEZR/L on 01/22/2024)  Short Term Goal 4: Demo LB ADLs with CGA and use of DME/adaptive strategies PRN (updated by Vika RODRIGUEZR/L on 01/22/2024)  Short Term Goal 5: Participate in R UE A/AROM and strenghening for 10 minutes each visit to promote increased functional use throughout ADLs and to avoid contracture formation  Short Term Goal 6: Participate in 8 minutes of proprioceptive activity to RUE for promotion of increased functional use throughout ADLs  Short Term Goal 7: NOTIFY OTR/L WHEN GOALS ARE APPROPRIATE TO BE PROGRESSED       Therapy Time   Individual Concurrent Group Co-treatment   Time In 1001         Time Out 1030         Minutes 29         Timed Code Treatment Minutes: 29 Minutes       Paola Larios OTR/MAGDIEL

## 2024-01-22 NOTE — PROGRESS NOTES
Speech Language Pathology  [facility]    Speech Language and Dysphagia Treatment Note    Date: 1/22/2024  Patient’s Name: Jagdeep Quintana Sr.  MRN: [id number]  Diagnosis:   Patient Active Problem List   Diagnosis Code    Uncontrolled hypertension I10    Chronic shoulder pain M25.519, G89.29    Acute low back pain M54.50    Cervical pain M54.2    Neck sprain S13.9XXA    Lumbar sprain S33.5XXA    DDD (degenerative disc disease), cervical M50.30    DDD (degenerative disc disease), lumbar M51.36    Smoker F17.200    Acquired spondylolisthesis M43.10    Intraparenchymal hemorrhage of brain (HCC) I61.9    Intracranial hemorrhage (HCC) I62.9    Hyponatremia E87.1    Alcohol withdrawal syndrome, with delirium ([facility]) F10.931    Polysubstance abuse (HCC) F19.10    ETOH abuse F10.10    Vertebral artery occlusion, 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 Oriented to month given min verbal cues. Not oriented to location or year when given 2 choices.   Repeating 3-syllable words: 5/5  Repeating rote phrases: 2/2  Completing sentences: 8/10 increased to 9/10 with self correction.  Category members (concrete): 19/22 increased to 20/22 with min verbal cues.      Speech: Repeating 3-syllable words: 5/5  Repeating rote phrases: 2/2    ST reviewed O/M treatment program for dysarthria, pt demonstrated understanding.  Education provided re: compensatory strategies to increase speech intelligibility/clarity.  Pt. Verbalized understanding.  Exercise program and strategies left at bedside. Pt benefited  from min-mod verbal cues throughout therapy session to implement strategies, continues with decreased intelligibility in spontaneous phrases/conversation.    Dysphagia: Pt currently on Dysphagia Minced and Moist (Dysphagia II) with Mildly Thick (Nectar) following MBSS completed on 1/17/24. RN and pt report pt is tolerating current diet however pt reports distaste of puree/minced and moist textures. ST observed pt with multiple trials of puree and soft solid with no overt s/s of aspiration. No pocketing or oral residue noted. Pt self fed and benefited from cues to take smaller bites. Pt tolerated regular solid x3 with no overt s/s of aspiration and adequate mastication, however fourth trial pt with +coughing with trial of Mildly Thick (Nectar) liquid wash. Additional trials of nectar pt with no overt s/s of aspiration.     Recommend advancing solids to Easy to Chew, continue Mildly Thick (Nectar). Recommend repeat Modified Barium Swallow Study 1/23/24 to reassess liquids/possible advancement (silent aspiration of thin on prior MBSS). Recommend small sips and bites, only feed when alert and awake and upright at 90 degrees for all PO intake.  Results and recommendations reported to RN.      Plan:  [x] Continue ST services    [] Discharge from ST:      Discharge recommendations: []  Further therapy recommended at discharge.The patient should be able to tolerate at least 3 hours of therapy per day over 5 days or 15 hours over 7 days. [x] Further therapy recommended at discharge.   [] No therapy recommended at discharge.      Completed by: Vanessa Alvares  Clinician    Cosigned By: Fatou White M.S.CCC/SLP

## 2024-01-22 NOTE — PROGRESS NOTES
Endovascular Neurosurgery Progress Note    SUBJECTIVE:     NAEO    Review of Systems:  CONSTITUTIONAL:  negative for fevers, chills, fatigue and malaise    EYES:  negative for double vision, blurred vision and photophobia     HEENT:  negative for tinnitus, epistaxis and sore throat    RESPIRATORY:  negative for cough, shortness of breath, wheezing    CARDIOVASCULAR:  negative for chest pain, palpitations, syncope, edema    GASTROINTESTINAL:  negative for nausea, vomiting    GENITOURINARY:  negative for incontinence    MUSCULOSKELETAL:  negative for neck or back pain    NEUROLOGICAL:  Negative for weakness and tingling  negative for headaches and dizziness    PSYCHIATRIC:  negative for anxiety      Review of systems otherwise negative.      OBJECTIVE:     Vitals:    01/22/24 0324   BP: (!) 140/82   Pulse: 69   Resp: 17   Temp: 98.4 °F (36.9 °C)   SpO2:         General:  Gen: normal habitus, NAD  HEENT: NCAT, mucosa moist  Cvs: RRR, S1 S2 normal  Resp: symmetric unlabored breathing  Abd: s/nd/nt  Ext: no edema  Skin: no lesions seen, warm and dry    Neuro:  Gen: awake and alert, oriented x3.   Lang/speech: no aphasia, very dysarthric. Follows all commands.  CN: PERRL, EOMI, VFF, V1-3 intact, face symmetric, hearing intact, shoulder shrug symmetric, tongue midline  Motor: grossly 5/5 UE and LE on the left, right arm 4+/5, right leg 4+/5 (no drift)   Sense: LT intact in all 4 ext.  Coord: FTN and HTS intact b/l  DTR: deferred  Gait: normal with a walker    NIH Stroke Scale:   1a  Level of consciousness: 0 - alert; keenly responsive   1b. LOC questions:  0 - answers both questions correctly   1c. LOC commands: 0 - performs both tasks correctly   2.  Best Gaze: 0 - normal   3. Visual: 0 - no visual loss   4. Facial Palsy: 1 - minor paralysis (flattened nasolabial fold, asymmetric on smiling)   5a. Motor left arm: 0 - no drift, limb holds 90 (or 45) degrees for full 10 seconds   5b.  Motor right arm: 0   6a. Motor left

## 2024-01-22 NOTE — PROGRESS NOTES
[facility] Neurology   IN-PATIENT SERVICE   [facility]    Progress Note             Date:   1/22/2024  Patient name:  Jagdeep Quintana Sr.  Date of admission:  1/14/2024  6:26 PM  MRN:   [id number]  Account:  [id number]  YOB: 1970  PCP:    Giovanna Martinez APRN - CNP  Room:   [address] [place] [address]  Code Status:    Full Code    Chief Complaint:     Chief Complaint   Patient presents with    Aphasia     LKW 1800    Extremity Weakness     RUE and RLE    Facial Droop     Right side       Interval hx:     The patient was seen and examined at bedside. Is vitally stable, alert and oriented x 3. No acute events overnight.  Dysarthria and R-sided weakness continue to improve  Awaiting placement to acute rehab      Brief History of Present Illness:     The patient is a 53 y.o.  Non- / non  male who presents with Aphasia (LKW 1800), Extremity Weakness (RUE and RLE), and Facial Droop (Right side)   and he is admitted to the hospital for the management of left basilar IPH  PMH significant for HTN, alcohol and polysubstance abuse.  Patient was brought in by EMS on 1/15 with complaints of right-sided muscle weakness, facial droop and dysarthria.  Upon arrival to ER he was found to be significantly hypertensive with SBP> 200.  Stat CT head was obtained and showed IPH left basal ganglia.  He was admitted under NICU for further management.  UDS came back positive for cocaine and methadone.  He initially was started on Cardene drip for the management of hypertension but subsequently was transitioned to oral therapy with lisinopril and Norvasc.  Control CT head the next day after admission showed no significant change in size.  Over his course in NICU patient has had symptoms of alcohol withdrawal that appears to be subsiding this time. The last dose of phenobarbital was given on 1/18 at 8 am      Past Medical History:     Past Medical History:   Diagnosis Date    Hypertension         Past Surgical  Range    WBC 7.4 3.5 - 11.3 k/uL    RBC 4.11 (L) 4.21 - 5.77 m/uL    Hemoglobin 13.9 13.0 - 17.0 g/dL    Hematocrit 40.3 (L) 40.7 - 50.3 %    MCV 98.1 82.6 - 102.9 fL    MCH 33.8 (H) 25.2 - 33.5 pg    MCHC 34.5 28.4 - 34.8 g/dL    RDW 12.2 11.8 - 14.4 %    Platelets 293 138 - 453 k/uL    MPV 8.7 8.1 - 13.5 fL    NRBC Automated 0.0 0.0 per 100 WBC    Neutrophils % 67 (H) 36 - 65 %    Lymphocytes % 16 (L) 24 - 43 %    Monocytes % 12 3 - 12 %    Eosinophils % 4 1 - 4 %    Basophils % 1 0 - 2 %    Immature Granulocytes 0 0 %    Neutrophils Absolute 4.94 1.50 - 8.10 k/uL    Lymphocytes Absolute 1.16 1.10 - 3.70 k/uL    Monocytes Absolute 0.85 0.10 - 1.20 k/uL    Eosinophils Absolute 0.32 0.00 - 0.44 k/uL    Basophils Absolute 0.05 0.00 - 0.20 k/uL    Absolute Immature Granulocyte 0.03 0.00 - 0.30 k/uL   Basic Metabolic Panel w/ Reflex to MG    Collection Time: 01/22/24  4:40 AM   Result Value Ref Range    Sodium 131 (L) 136 - 145 mmol/L    Potassium 4.5 3.7 - 5.3 mmol/L    Chloride 99 98 - 107 mmol/L    CO2 21 20 - 31 mmol/L    Anion Gap 11 9 - 16 mmol/L    Glucose 94 74 - 99 mg/dL    BUN 16 6 - 20 mg/dL    Creatinine 0.7 0.70 - 1.20 mg/dL    Est, Glom Filt Rate >60 >60 mL/min/1.73m2    Calcium 8.7 8.6 - 10.4 mg/dL     Recent Labs     01/22/24  0440   WBC 7.4   RBC 4.11*   HGB 13.9   HCT 40.3*   MCV 98.1   MCH 33.8*   MCHC 34.5   RDW 12.2      MPV 8.7       Recent Labs     01/22/24  0440   *   K 4.5   CL 99   CO2 21   BUN 16   CREATININE 0.7   GLUCOSE 94   CALCIUM 8.7       Hemoglobin A1C   Date Value Ref Range Status   01/15/2024 5.5 4.0 - 6.0 % Final       Assessment :      Primary Problem  Intraparenchymal hemorrhage of brain (HCC)    Active Hospital Problems    Diagnosis Date Noted    Vertebral artery occlusion, left [I65.02] 01/20/2024    Asymptomatic vertebral artery stenosis, right [I65.01] 01/20/2024    Polysubstance abuse (HCC) [F19.10] 01/19/2024    ETOH abuse [F10.10] 01/19/2024    Alcohol

## 2024-01-22 NOTE — PROGRESS NOTES
Physical Therapy  Facility/Department: [address] STEPDOWN  Physical Therapy Daily Treatment Note    Name: Jagdeep Quintana Sr.  : 1970  MRN: [id number]  Date of Service: 2024    Discharge Recommendations:  Patient would benefit from continued therapy after discharge   PT Equipment Recommendations  Equipment Needed: Yes  Mobility Devices: Walker  Walker: Rolling      Patient Diagnosis(es): The primary encounter diagnosis was Intracranial hemorrhage (HCC). A diagnosis of Intraparenchymal hemorrhage of brain (HCC) was also pertinent to this visit.  Past Medical History:  has a past medical history of Hypertension.  Past Surgical History:  has a past surgical history that includes Finger surgery.    Assessment   Body Structures, Functions, Activity Limitations Requiring Skilled Therapeutic Intervention: Decreased functional mobility ;Decreased cognition;Decreased coordination;Decreased endurance;Decreased balance;Decreased strength;Decreased safe awareness  Assessment: Pt able to perform bed mobility and transfers with CGA. Pt ambulated 100ft x3 with RW and Ursula, fair stability with no significant LOB. Pt most limited by R side weakness and decreased balance and endurance. Recommending continued PT to address deficits and maximize safety and independence with mobility. He is currently a fall risk with decreased awareness of deficits and would be unsafe to return to prior living arrangements at discharge.  Therapy Prognosis: Good  Requires PT Follow-Up: Yes  Activity Tolerance  Activity Tolerance: Patient tolerated treatment well;Patient limited by endurance  Activity Tolerance Comments: R side weakness, aphasia.     Plan   Physical Therapy Plan  General Plan: 6-7 times per week  Current Treatment Recommendations: Strengthening, ROM, Balance training, Functional mobility training, Transfer training, Gait training, Safety education & training, Home exercise program, Therapeutic activities, Patient/Caregiver  deficits  Initiation: Requires cues for some  Sequencing: Requires cues for some  Cognition Comment: Pt demos poor safety awareness and impulsivity, Required verbal  cues throughout for safety.     Objective   Bed mobility  Supine to Sit: Contact guard assistance  Sit to Supine: Contact guard assistance  Scooting: Contact guard assistance  Bed Mobility Comments: HOB elevated, increased time/effort, able to perform with CGA.  Transfers  Sit to Stand: Contact guard assistance  Stand to Sit: Contact guard assistance  Comment: RW used for transfers, pt performed x2 from EOB with cueing required for correct hand placement throughout transfers, poor return demonstrated.  Ambulation  Surface: Level tile  Device: Rolling Walker  Assistance: Minimal assistance  Quality of Gait: ataxic, fair stability, decreased  strength on R side. R side weakness, R knee buckling., very narrow ROWDY, able to correct with cues.  Gait Deviations: Slow Clare;Decreased step length;Decreased step height  Distance: 100ft x3  Comments: Pt with no significant LOB, required cueing to correct narrow ROWDY throughout, pt with noted R side deficits needing physical assistance to maintain R  on RW.  More Ambulation?: No  Stairs/Curb  Stairs?: No     Balance  Posture: Fair  Sitting - Static: Fair  Sitting - Dynamic: Fair  Standing - Static: Fair  Standing - Dynamic: Fair  Comments: standing balance assessed with RW, able to sit EOB and unsupported with CGA.    Exercise Treatment:  Seated LE exercise program: Long Arc Quads, hip abduction/adduction, heel/toe raises, and marches. Reps: x10 BLE  Comments: Pt performed while seated EOB.   BUE exercises: shoulder flexion, shoulder shrugs, punches. Reps: x5 BUE      AM-PAC - Mobility  AM-PAC Basic Mobility - Inpatient   How much help is needed turning from your back to your side while in a flat bed without using bedrails?: A Little  How much help is needed moving from lying on your back to sitting on the

## 2024-01-23 ENCOUNTER — APPOINTMENT (OUTPATIENT)
Dept: GENERAL RADIOLOGY | Age: 54
End: 2024-01-23
Payer: COMMERCIAL

## 2024-01-23 LAB
ANION GAP SERPL CALCULATED.3IONS-SCNC: 10 MMOL/L (ref 9–17)
BUN SERPL-MCNC: 13 MG/DL (ref 6–20)
CALCIUM SERPL-MCNC: 8.8 MG/DL (ref 8.6–10.4)
CHLORIDE SERPL-SCNC: 97 MMOL/L (ref 98–107)
CO2 SERPL-SCNC: 24 MMOL/L (ref 20–31)
CREAT SERPL-MCNC: 0.6 MG/DL (ref 0.7–1.2)
GFR SERPL CREATININE-BSD FRML MDRD: >60 ML/MIN/1.73M2
GLUCOSE SERPL-MCNC: 82 MG/DL (ref 70–99)
POTASSIUM SERPL-SCNC: 4.3 MMOL/L (ref 3.7–5.3)
SODIUM SERPL-SCNC: 131 MMOL/L (ref 135–144)

## 2024-01-23 PROCEDURE — 97535 SELF CARE MNGMENT TRAINING: CPT

## 2024-01-23 PROCEDURE — 6360000002 HC RX W HCPCS: Performed by: NURSE PRACTITIONER

## 2024-01-23 PROCEDURE — 6370000000 HC RX 637 (ALT 250 FOR IP): Performed by: NURSE PRACTITIONER

## 2024-01-23 PROCEDURE — 36415 COLL VENOUS BLD VENIPUNCTURE: CPT

## 2024-01-23 PROCEDURE — 92611 MOTION FLUOROSCOPY/SWALLOW: CPT

## 2024-01-23 PROCEDURE — 97110 THERAPEUTIC EXERCISES: CPT

## 2024-01-23 PROCEDURE — 97129 THER IVNTJ 1ST 15 MIN: CPT

## 2024-01-23 PROCEDURE — 97530 THERAPEUTIC ACTIVITIES: CPT

## 2024-01-23 PROCEDURE — 2060000000 HC ICU INTERMEDIATE R&B

## 2024-01-23 PROCEDURE — 99232 SBSQ HOSP IP/OBS MODERATE 35: CPT | Performed by: PSYCHIATRY & NEUROLOGY

## 2024-01-23 PROCEDURE — 74230 X-RAY XM SWLNG FUNCJ C+: CPT

## 2024-01-23 PROCEDURE — 2580000003 HC RX 258

## 2024-01-23 PROCEDURE — 97116 GAIT TRAINING THERAPY: CPT

## 2024-01-23 PROCEDURE — 80048 BASIC METABOLIC PNL TOTAL CA: CPT

## 2024-01-23 PROCEDURE — 92507 TX SP LANG VOICE COMM INDIV: CPT

## 2024-01-23 RX ADMIN — Medication 100 MG: at 08:12

## 2024-01-23 RX ADMIN — SODIUM CHLORIDE, PRESERVATIVE FREE 10 ML: 5 INJECTION INTRAVENOUS at 22:55

## 2024-01-23 RX ADMIN — ENOXAPARIN SODIUM 40 MG: 100 INJECTION SUBCUTANEOUS at 08:11

## 2024-01-23 RX ADMIN — FOLIC ACID 1 MG: 1 TABLET ORAL at 08:12

## 2024-01-23 RX ADMIN — SENNOSIDES AND DOCUSATE SODIUM 2 TABLET: 50; 8.6 TABLET ORAL at 08:12

## 2024-01-23 RX ADMIN — AMLODIPINE BESYLATE 10 MG: 10 TABLET ORAL at 08:12

## 2024-01-23 RX ADMIN — LISINOPRIL 40 MG: 20 TABLET ORAL at 08:12

## 2024-01-23 RX ADMIN — SODIUM CHLORIDE, PRESERVATIVE FREE 10 ML: 5 INJECTION INTRAVENOUS at 08:13

## 2024-01-23 ASSESSMENT — ENCOUNTER SYMPTOMS: APNEA: 0

## 2024-01-23 NOTE — CARE COORDINATION
Transitional planning: Phone call to Sandra with [facility] to inquire about status of precert. It is still pending.

## 2024-01-23 NOTE — PROGRESS NOTES
stair performance.  Education Method: Verbal;Demonstration  Barriers to Learning: Cognition  Education Outcome: Continued education needed;Verbalized understanding;Demonstrated understanding      Therapy Time   Individual Concurrent Group Co-treatment   Time In 1115         Time Out 1155         Minutes 40         Timed Code Treatment Minutes: 38 Minutes       Brit Richards PTA

## 2024-01-23 NOTE — PROGRESS NOTES
Endovascular Neurosurgery Progress Note    SUBJECTIVE:     NAEO    Review of Systems:  CONSTITUTIONAL:  negative for fevers, chills, fatigue and malaise    EYES:  negative for double vision, blurred vision and photophobia     HEENT:  negative for tinnitus, epistaxis and sore throat    RESPIRATORY:  negative for cough, shortness of breath, wheezing    CARDIOVASCULAR:  negative for chest pain, palpitations, syncope, edema    GASTROINTESTINAL:  negative for nausea, vomiting    GENITOURINARY:  negative for incontinence    MUSCULOSKELETAL:  negative for neck or back pain    NEUROLOGICAL:  Negative for weakness and tingling  negative for headaches and dizziness    PSYCHIATRIC:  negative for anxiety      Review of systems otherwise negative.      OBJECTIVE:     Vitals:    01/23/24 0051   BP: 137/72   Pulse: 51   Resp: 14   Temp: 98 °F (36.7 °C)   SpO2: 98%        General:  Gen: normal habitus, NAD  HEENT: NCAT, mucosa moist  Cvs: RRR, S1 S2 normal  Resp: symmetric unlabored breathing  Abd: s/nd/nt  Ext: no edema  Skin: no lesions seen, warm and dry    Neuro:  Gen: awake and alert, oriented x3.   Lang/speech: no aphasia, very dysarthric. Follows all commands.  CN: PERRL, EOMI, VFF, V1-3 intact, face symmetric, hearing intact, shoulder shrug symmetric, tongue midline  Motor: grossly 5/5 UE and LE on the left, right arm 4+/5, right leg 4+/5 (no drift)   Sense: LT intact in all 4 ext.  Coord: FTN and HTS intact b/l  DTR: deferred  Gait: normal with a walker    NIH Stroke Scale:   1a  Level of consciousness: 0 - alert; keenly responsive   1b. LOC questions:  0 - answers both questions correctly   1c. LOC commands: 0 - performs both tasks correctly   2.  Best Gaze: 0 - normal   3. Visual: 0 - no visual loss   4. Facial Palsy: 1 - minor paralysis (flattened nasolabial fold, asymmetric on smiling)   5a. Motor left arm: 0 - no drift, limb holds 90 (or 45) degrees for full 10 seconds   5b.  Motor right arm: 0   6a. Motor left  le - no drift; leg holds 30 degree position for full 5 seconds   6b  Motor right le - no drift; leg holds 30 degree position for full 5 seconds   7. Limb Ataxia: 0 - absent   8.  Sensory: 1 - mild to moderate sensory loss; patient feels pinprick is less sharp or is dull on the affected side; there is a loss of superficial pain with pinprick but patient is aware of being touched    9. Best Language:  0 - no aphasia, normal   10. Dysarthria: 2 - severe; patient speech is so slurred as to be unintelligible in the absence of or our of proportion to any dysphagia, or is mute/anarthric    11. Extinction and Inattention: 0 - no abnormality         Total:   4     MRS: 2      LABS:   Reviewed.  Lab Results   Component Value Date    HGB 13.9 2024    WBC 7.4 2024     2024     (L) 2024    BUN 16 2024    CREATININE 0.7 2024    AST 24 2020    ALT 23 2020    APTT 27.8 2024    INR 0.9 2024      No results found for: \"COVID19\"    RADIOLOGY:   Images were personally reviewed including:    CTA head neck 2024: chronic left VA occlusion      R VA origin severe stenosis          ASSESSMENT:     52 yo male with history of HTN, alcoholism, and polysubstance abuse who presents with acute onset aphasia and right sided weakness. Initial -210. CT head showed acute left basal ganglia IPH, ICH score 0. 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     PLAN:     - no endovascular intervention for now  - aspirin 81mg when possible (ideally in 1-2 weeks post bleed)   - out patient follow up of R VA origin severe stenosis    - f/u with Dr. Estrella in 3 months    Case discussed with Dr. Estrella attending.    Milli Tam MD    Stroke, Neurocritical Care & Neurointervention  [facility]  [facility] [place]  Electronically signed 2024

## 2024-01-23 NOTE — PROCEDURES
INSTRUMENTAL SWALLOW REPORT  MODIFIED BARIUM SWALLOW    NAME: Jagdeep Quintana Sr.   : 1970  MRN: [id number]       Date of Eval: 2024              Referring Diagnosis(es):      Past Medical History:  has a past medical history of Hypertension.  Past Surgical History:  has a past surgical history that includes Finger surgery.      Type of Study: Repeat MBS    Patient Complaints/Reason for Referral:  Jagdeep Quintana Sr. was referred for a MBS to assess the efficiency of his/her swallow function, assess for aspiration, and to make recommendations regarding safe dietary consistencies, effective compensatory strategies, and safe eating environment.       Onset of problem:      53-year-old male with past medical history HTN, DDD, smoking who presents to the ED as a race stroke alert.  Last known well was approximately 1800 earlier today.  He presented with right upper and right lower extremity weakness, aphasia, dysarthria, right-sided facial droop.  He was found to be hypertensive ( on arrival).  NIH 13 for aphasia, right sided drift, facial droop, sensory loss. EMS reported no use of blood thinners.  The patient has incomprehensible speech, global aphasia, for this reason history is limited.  Imaging significant for IPH left basal ganglia.  Patient started on Cardene drip for SBP goal < 140.  Patient admitted to neuro ICU for further care.       Behavior/Cognition/Vision/Hearing:  Behavior/Cognition: Alert;Cooperative  Vision: Impaired  Hearing: Within functional limits    Impressions:  Patient presents with safe swallow for Regular diet with thin liquids with chin tuck and no straws as evidenced by no observed aspiration noted with consistencies tested.  Recommend small sips and bites, only feed when alert and awake and upright at 90 degrees for all PO intake.  Recommend close monitoring for overt/clinical s/s of aspiration and D/C PO intake and complete Modified Barium Swallow Study should  they occur.  Results and recommendations reported to RN.      Patient Position: Lateral       Consistencies Administered: Regular;Soft & Bite Sized;Pureed;Mildly Thick cup;Thin cup      Dysphagia Outcome Severity Scale: Level 6: Within functional limits/Modified independence  Penetration-Aspiration Scale (PAS): 6 - Material enters the airway, passes below the vocal folds, and is ejected into the larynx or out of the airway    Recommended Diet:  Solid consistency: Regular  Liquid consistency: Thin (with chin tuck) NO STRAWS  Liquid administration via: Cup    Medication administration: Meds in puree    Safe Swallow Protocol:  Supervision: Close  Compensatory Swallowing Strategies : Alternate solids and liquids;Small bites/sips;Upright as possible for all oral intake;Chin tuck;No straws;Eat/Feed slowly      Recommendations/Treatment  Requires SLP Intervention: Yes   3-5X/week  Further therapy recommended at discharge.      D/C Recommendations: Ongoing speech therapy is recommended during this hospitalization;Ongoing speech therapy is recommended at next level of care  Postural Changes and/or Swallow Maneuvers: Upright;Chin tuck;Upright 90 degrees      Recommended Exercises:    Therapeutic Interventions: Diet tolerance monitoring;Laryngeal exercises    Education: Images and recommendations were reviewed with pt following this exam.   Patient Education: yes  Patient Education Response: Verbalizes understanding      Goals:    Long Term:       To Maximize safety with intake, optimize nutrition/hydration and minimize risk for aspiration.      Short Term:    Dysphagia Goals: The patient will tolerate recommended diet without observed clinical signs of aspiration      Oral Preparation / Oral Phase: Impaired     Decreased bolus formation with spillover to pyriform with soft and dry solids      Pharyngeal Phase: Impaired     Puree: No penetration no aspiration min pyriform stasis  Soft Solids: spill to pyriform no penetration

## 2024-01-23 NOTE — PROGRESS NOTES
Occupational Therapy  Facility/Department: [address] STEPDOWN  Occupational Therapy Daily Treatment Note    Name: Jagdeep Quintana Sr.  : 1970  MRN: [id number]  Date of Service: 2024    Discharge Recommendations:  Patient would benefit from continued therapy after discharge  OT Equipment Recommendations  Other: Continue to assess as pt's mobility progresses to ensure that equiptment reccomended is appropriate.       Patient Diagnosis(es): The primary encounter diagnosis was Intracranial hemorrhage (HCC). A diagnosis of Intraparenchymal hemorrhage of brain (HCC) was also pertinent to this visit.  Past Medical History:  has a past medical history of Hypertension.  Past Surgical History:  has a past surgical history that includes Finger surgery.    Assessment   Performance deficits / Impairments: Decreased functional mobility ;Decreased ADL status;Decreased endurance;Decreased high-level IADLs;Decreased ROM;Decreased strength;Decreased fine motor control;Decreased cognition;Decreased safe awareness;Decreased balance;Decreased coordination  Assessment: Further therapy recommended at discharge.The patient should be able to tolerate at least 3 hours of therapy per day over 5 days or 15 hours over 7 days.   This patient may benefit from a Physical Medicine and Rehab consult.    Prognosis: Good  REQUIRES OT FOLLOW-UP: Yes  Activity Tolerance  Activity Tolerance: Patient Tolerated treatment well        Plan   Occupational Therapy Plan  Times Per Week: 4-5x/wk  Current Treatment Recommendations: Strengthening, ROM, Balance training, Functional mobility training, Endurance training, Safety education & training, Patient/Caregiver education & training, Equipment evaluation, education, & procurement, Self-Care / ADL, Cognitive/Perceptual training, Positioning     Restrictions  Restrictions/Precautions  Restrictions/Precautions: Seizure, Fall Risk  Required Braces or Orthoses?: No  Position Activity Restriction  Other  toileting (which includes using toilet, bedpan, or urinal)?: A Little  How much help is needed for putting on and taking off regular upper body clothing?: A Little  How much help is needed for taking care of personal grooming?: A Little  How much help for eating meals?: A Little  AM-[facility] Inpatient Daily Activity Raw Score: 18  AM-PAC Inpatient ADL T-Scale Score : 38.66  ADL Inpatient CMS 0-100% Score: 46.65  ADL Inpatient CMS G-Code Modifier : CK    Goals  Short Term Goals  Time Frame for Short Term Goals: By discharge, pt will:  Short Term Goal 1: Demo safe functional transfers with SBA, LRD PRN and <2 cues for initiation/sequencing to promote increased engagement ADL activity (updated by Vika RODRIGUEZR/L on 01/22/2024)  Short Term Goal 2: Demo 10 minutes of dynamic standing tolerance with CGA  to promote increased independence throughout ADLs (updated by Vika OTR/L on 01/22/2024)  Short Term Goal 3: Demo UB ADLs with SBA and use of DME/adaptive strategies PRN (updated by Vika OTR/L on 01/22/2024)  Short Term Goal 4: Demo LB ADLs with CGA and use of DME/adaptive strategies PRN (updated by Vika OTR/L on 01/22/2024)  Short Term Goal 5: Participate in R UE A/AROM and strenghening for 10 minutes each visit to promote increased functional use throughout ADLs and to avoid contracture formation  Short Term Goal 6: Participate in 8 minutes of proprioceptive activity to RUE for promotion of increased functional use throughout ADLs  Short Term Goal 7: NOTIFY OTR/L WHEN GOALS ARE APPROPRIATE TO BE PROGRESSED       Therapy Time   Individual Concurrent Group Co-treatment   Time In 1538         Time Out 1603         Minutes 25         Timed Code Treatment Minutes: 23 Minutes       SHAKIRA Evans/MAGDIEL

## 2024-01-23 NOTE — PROGRESS NOTES
[facility] Neurology   IN-PATIENT SERVICE   [facility]    Progress Note             Date:   1/23/2024  Patient name:  Jagdeep Quintana Sr.  Date of admission:  1/14/2024  6:26 PM  MRN:   [id number]  Account:  [id number]  YOB: 1970  PCP:    Giovanna Martinez APRN - CNP  Room:   [address] [place] [address]  Code Status:    Full Code    Chief Complaint:     Chief Complaint   Patient presents with    Aphasia     LKW 1800    Extremity Weakness     RUE and RLE    Facial Droop     Right side       Interval hx:     Seen and examined at bedside    Patient initially had aphasia with symptoms, with extremely elevated blood pressure of 205 systolic.-Status post left basal ganglia IPH with ICH score of 0.  Endovascular still with no intervention and will want aspirin 81 mg with possible    Continues to have some residual dysarthria aphasia and right hemiparesis with a positive UDS for cocaine and methadone    Went down for swallow study, awaiting results    Continue dual antiplatelets for now    [facility]        Brief History of Present Illness:     The patient is a 53 y.o.  Non- / non  male who presents with Aphasia (LKW 1800), Extremity Weakness (RUE and RLE), and Facial Droop (Right side)   and he is admitted to the hospital for the management of left basilar IPH  PMH significant for HTN, alcohol and polysubstance abuse.  Patient was brought in by EMS on 1/15 with complaints of right-sided muscle weakness, facial droop and dysarthria.  Upon arrival to ER he was found to be significantly hypertensive with SBP> 200.  Stat CT head was obtained and showed IPH left basal ganglia.  He was admitted under NICU for further management.  UDS came back positive for cocaine and methadone.  He initially was started on Cardene drip for the management of hypertension but subsequently was transitioned to oral therapy with lisinopril and Norvasc.  Control CT head the next day after  of the blood-brain barrier given the subacute age of the hematoma.  - Keep SBP<160     Asymptomatic bilateral [place] severe stenosis versus occlusion  - NEV recommended outpatient follow-up for possible stenting  - Start aspirin in 1-2 weeks     Hyponatremia likely 2/2 alcohol abuse  - Salt tablets with meals 3 times daily  - Free water restriction 1500  - Continue to monitor daily, this morning Na 131     Essential hypertension  -Continue Norvasc 10 mg, lisinopril 40 mg daily     Alcohol abuse  -Monitor for withdrawal symptoms     Diet: Dysphagia-minced and moist  DVT prophylaxis: Lovenox 40 mg daily  PT/OT/SW        Patient and Family Stroke Education     Patient and/or family Education discussed today:  Stroke type: hemorrhagic stroke   hypertension, smoking, and polysubstance abuse (including cocaine)  Stroke Education Topics: Medication Compliance  Importance of Followup     Patient or family members expressed understanding on topics that were discussed and all their questions were answered.    Follow-up further recommendations after discussing case with the attending.  The plan was discussed with the patient, patient's family and the medical staff.   Consultations:   IP CONSULT TO NEUROSURGERY  IP CONSULT TO NEUROCRITICAL CARE  IP CONSULT TO ENDOVASCULAR NEUROSURGERY  IP CONSULT TO PHYSICAL MEDICINE REHAB  IP CONSULT TO SPIRITUAL SERVICES    Patient is admitted as inpatient status because of co-morbidities listed above, severity of signs and symptoms as outlined, requirement for current medical therapies and most importantly because of direct risk to patient if care not provided in a hospital setting.    Dimitri Peoples MD  Neurology Resident PGY-2   1/23/2024  8:23 AM    Copy sent to Giovanna Carvalho, APRN - CNP

## 2024-01-23 NOTE — PLAN OF CARE
Problem: Discharge Planning  Goal: Discharge to home or other facility with appropriate resources  1/23/2024 1834 by Rosalia Robin, RN  Outcome: Progressing  1/23/2024 0516 by Loretta Meehan, RN  Outcome: Progressing  Flowsheets (Taken 1/22/2024 2000)  Discharge to home or other facility with appropriate resources: Identify barriers to discharge with patient and caregiver     Problem: Safety - Adult  Goal: Free from fall injury  1/23/2024 1834 by Rosalia Robin, RN  Outcome: Progressing  1/23/2024 0516 by Loretta Meehan RN  Outcome: Progressing     Problem: Pain  Goal: Verbalizes/displays adequate comfort level or baseline comfort level  Outcome: Progressing     Problem: Skin/Tissue Integrity  Goal: Absence of new skin breakdown  Description: 1.  Monitor for areas of redness and/or skin breakdown  2.  Assess vascular access sites hourly  3.  Every 4-6 hours minimum:  Change oxygen saturation probe site  4.  Every 4-6 hours:  If on nasal continuous positive airway pressure, respiratory therapy assess nares and determine need for appliance change or resting period.  Outcome: Progressing     Problem: ABCDS Injury Assessment  Goal: Absence of physical injury  Outcome: Progressing

## 2024-01-23 NOTE — PLAN OF CARE
Problem: Discharge Planning  Goal: Discharge to home or other facility with appropriate resources  Outcome: Progressing  Flowsheets (Taken 1/22/2024 2000)  Discharge to home or other facility with appropriate resources: Identify barriers to discharge with patient and caregiver     Problem: Safety - Adult  Goal: Free from fall injury  Outcome: Progressing

## 2024-01-23 NOTE — CARE COORDINATION
SW met with patient to assess support and complete PHQ-9 screen.  Pt stated he was independent prior to admission including driving.  Pt admitted to drinking a 24/pack of beer a week.  When SW asked patient if he uses drugs patient admitted to drug use however did not tell writer what drugs stating \"it doesn't matter\".  Patient denied feelings of depression, suicidal or homicidal thoughts.  Patient's support system includes his sister and family.    Patient is planning to [place] to [facility] for continued rehab.      Patient Health Questionnaire-9 (PHQ-9)    Over the last 2 weeks, how often have you been bothered by any of the following problems?    1. Little Interest or pleasure in doing things?   [x] Not at all  [] Several Days  [] More than half the day  []  Nearly every day    2. Feeling down, depressed or hopeless?    [x] Not at all  [] Several Days  [] More than half the day  []  Nearly every day    3. Trouble falling or staying asleep, or sleeping too much?   [x] Not at all  [] Several Days  [] More than half the day  []  Nearly every day    4. Feeling tired or having little energy?   [x] Not at all  [] Several Days  [] More than half the day  []  Nearly every day    5. Poor apettite or overeating?   [x] Not at all  [] Several Days  [] More than half the day  []  Nearly every day    6. Feeling bad about yourself-or that you are a failure or have let yourself or your family down?   [x] Not at all  [] Several Days  [] More than half the day  []  Nearly every day    7. Trouble concentrating on things, such as reading the newspaper or watching television?   [x] Not at all  [] Several Days  [] More than half the day  []  Nearly every day    8. Moving or speaking so slowly that other people could have noticed? Or the opposite-being so fidgety or restless that you have been moving around a lot more than usual?   [x] Not at all  [] Several Days  [] More than half the day  []  Nearly every day    9. Thoughts that

## 2024-01-24 PROCEDURE — 99232 SBSQ HOSP IP/OBS MODERATE 35: CPT | Performed by: PSYCHIATRY & NEUROLOGY

## 2024-01-24 PROCEDURE — 97129 THER IVNTJ 1ST 15 MIN: CPT

## 2024-01-24 PROCEDURE — 2580000003 HC RX 258

## 2024-01-24 PROCEDURE — 92507 TX SP LANG VOICE COMM INDIV: CPT

## 2024-01-24 PROCEDURE — 2060000000 HC ICU INTERMEDIATE R&B

## 2024-01-24 PROCEDURE — 6370000000 HC RX 637 (ALT 250 FOR IP): Performed by: NURSE PRACTITIONER

## 2024-01-24 PROCEDURE — 97110 THERAPEUTIC EXERCISES: CPT

## 2024-01-24 PROCEDURE — 97116 GAIT TRAINING THERAPY: CPT

## 2024-01-24 RX ADMIN — Medication 100 MG: at 08:41

## 2024-01-24 RX ADMIN — SODIUM CHLORIDE, PRESERVATIVE FREE 10 ML: 5 INJECTION INTRAVENOUS at 08:46

## 2024-01-24 RX ADMIN — LISINOPRIL 40 MG: 20 TABLET ORAL at 08:41

## 2024-01-24 RX ADMIN — SENNOSIDES AND DOCUSATE SODIUM 2 TABLET: 50; 8.6 TABLET ORAL at 08:41

## 2024-01-24 RX ADMIN — AMLODIPINE BESYLATE 10 MG: 10 TABLET ORAL at 08:41

## 2024-01-24 RX ADMIN — FOLIC ACID 1 MG: 1 TABLET ORAL at 08:41

## 2024-01-24 ASSESSMENT — ENCOUNTER SYMPTOMS
SHORTNESS OF BREATH: 0
DIARRHEA: 0
NAUSEA: 0
BACK PAIN: 0
COUGH: 0
CHEST TIGHTNESS: 0
SORE THROAT: 0
ABDOMINAL DISTENTION: 0
COLOR CHANGE: 0

## 2024-01-24 NOTE — PROGRESS NOTES
[facility] Neurology   IN-PATIENT SERVICE   [facility]    Progress Note             Date:   1/24/2024  Patient name:  Jagdeep Quintana Sr.  Date of admission:  1/14/2024  6:26 PM  MRN:   [id number]  Account:  [id number]  YOB: 1970  PCP:    Giovanna Martinez APRN - CNP  Room:   [address] [place] [address]  Code Status:    Full Code    Chief Complaint:     Chief Complaint   Patient presents with    Aphasia     LKW 1800    Extremity Weakness     RUE and RLE    Facial Droop     Right side       Interval hx:     Seen and examined at bedside    Patient doing this well this morning, slightly less aphasic and unable to understand his speech better.    Pastsed barium swallow study yesterday patient now on regular diet    Waiting placement      Brief History of Present Illness:     The patient is a 53 y.o.  Non- / non  male who presents with Aphasia (LKW 1800), Extremity Weakness (RUE and RLE), and Facial Droop (Right side)   and he is admitted to the hospital for the management of left basilar IPH  PMH significant for HTN, alcohol and polysubstance abuse.  Patient was brought in by EMS on 1/15 with complaints of right-sided muscle weakness, facial droop and dysarthria.  Upon arrival to ER he was found to be significantly hypertensive with SBP> 200.  Stat CT head was obtained and showed IPH left basal ganglia.  He was admitted under NICU for further management.  UDS came back positive for cocaine and methadone.  He initially was started on Cardene drip for the management of hypertension but subsequently was transitioned to oral therapy with lisinopril and Norvasc.  Control CT head the next day after admission showed no significant change in size.  Over his course in NICU patient has had symptoms of alcohol withdrawal that appears to be subsiding this time. The last dose of phenobarbital was given on 1/18 at 8 am           Past Medical History:     Past Medical History:   Diagnosis Date

## 2024-01-24 NOTE — PROGRESS NOTES
Speech Language Pathology  [facility]    Cognitive and Dysarthria Treatment Note    Date: 1/24/2024  Patient’s Name: Jagdeep Quintana Sr.  MRN: [id number]    Patient Active Problem List   Diagnosis Code    Uncontrolled hypertension I10    Chronic shoulder pain M25.519, G89.29    Acute low back pain M54.50    Cervical pain M54.2    Neck sprain S13.9XXA    Lumbar sprain S33.5XXA    DDD (degenerative disc disease), cervical M50.30    DDD (degenerative disc disease), lumbar M51.36    Smoker F17.200    Acquired spondylolisthesis M43.10    Intraparenchymal hemorrhage of brain (HCC) I61.9    Intracranial hemorrhage (HCC) I62.9    Hyponatremia E87.1    Alcohol withdrawal syndrome, with delirium (HCC) F10.931    Polysubstance abuse (HCC) F19.10    ETOH abuse F10.10    Vertebral artery occlusion, left I65.02    Asymptomatic vertebral artery stenosis, right I65.01     Pain: 0/10    Cognitive Treatment    Treatment time: 0812-0304      Subjective: [x] Alert [x] Cooperative     [] Confused     [] Agitated    [] Lethargic      Objective/Assessment:    Recall: Short-term recall of 3-units: 0/3 increased to 1/3 with min verbal cues, 1/3 increased to 2/3 with min verbal cues, 0/3 increased to 3/3 with min-mod verbal cues    Problem Solving/Reasoning: Determining category exclusions: 6/10 increased to 9/10 with min verbal cues and repetition  Category members (concrete): 13/19 increased to 19/19 with min verbal cues  Making word deductions: 6/9 increased to 9/9 with min-mod verbal cues and repetition  Stating situational problems: 3/6 increased to 6/6 with min-mod verbal cues    Other: Pt. Seen for O/M treatment program for dysarthria (facial weakness).  Pt. Completed O/M exercises X 5 X 3 sets with min verbal cues. Pt. Continues with mild-moderate dysarthria.  Education provided re: compensatory strategies to increase speech intelligibility/clarity.  Pt. Verbalized understanding.  Exercise program remains at  bedside.      Plan:  [x] Continue ST services    [] Discharge from ST:      Discharge recommendations: []  Further therapy recommended at discharge.The patient should be able to tolerate at least 3 hours of therapy per day over 5 days or 15 hours over 7 days. [x] Further therapy recommended at discharge.   [] No therapy recommended at discharge.      Completed by: Vanessa Alvares  Clinician    Cosigned By: Beatrice Schroeder M.A.CCC/SLP

## 2024-01-24 NOTE — PLAN OF CARE
Problem: Discharge Planning  Goal: Discharge to home or other facility with appropriate resources  1/24/2024 1727 by Rosalia Robin RN  Outcome: Progressing  1/24/2024 0514 by Loretta Meehan RN  Outcome: Progressing     Problem: Safety - Adult  Goal: Free from fall injury  1/24/2024 1727 by Rosalia Robin RN  Outcome: Progressing  1/24/2024 0514 by Loretta Meehan RN  Outcome: Progressing     Problem: Pain  Goal: Verbalizes/displays adequate comfort level or baseline comfort level  1/24/2024 1727 by Rosalia Robin RN  Outcome: Progressing  1/24/2024 0514 by Loretta Meehan RN  Outcome: Progressing     Problem: Skin/Tissue Integrity  Goal: Absence of new skin breakdown  Description: 1.  Monitor for areas of redness and/or skin breakdown  2.  Assess vascular access sites hourly  3.  Every 4-6 hours minimum:  Change oxygen saturation probe site  4.  Every 4-6 hours:  If on nasal continuous positive airway pressure, respiratory therapy assess nares and determine need for appliance change or resting period.  Outcome: Progressing     Problem: ABCDS Injury Assessment  Goal: Absence of physical injury  Outcome: Progressing

## 2024-01-24 NOTE — PROGRESS NOTES
Physical Therapy  Facility/Department: [address]  Physical Therapy Daily Treatment Note    Name: Jagdeep Quintana Sr.  : 1970  MRN: [id number]  Date of Service: 2024    Discharge Recommendations:  Patient would benefit from continued therapy after discharge   PT Equipment Recommendations  Equipment Needed: No      Patient Diagnosis(es): The primary encounter diagnosis was Intracranial hemorrhage (HCC). A diagnosis of Intraparenchymal hemorrhage of brain (HCC) was also pertinent to this visit.  Past Medical History:  has a past medical history of Hypertension.  Past Surgical History:  has a past surgical history that includes Finger surgery.    Assessment   Body Structures, Functions, Activity Limitations Requiring Skilled Therapeutic Intervention: Decreased functional mobility ;Decreased cognition;Decreased coordination;Decreased endurance;Decreased balance;Decreased strength;Decreased safe awareness  Assessment: Pt ambulated 50ft with RW and SBA, and an additional 200ft x2 with no AD and CGA provided for safety. Pt overall steady throughout ambulation, most limited by decreased balance and R side weakness. Recommending continued PT to address deficits and maximize safety and independence with mobility.  Therapy Prognosis: Good  Requires PT Follow-Up: Yes  Activity Tolerance  Activity Tolerance: Patient tolerated treatment well  Activity Tolerance Comments: R side weakness, aphasia.     Plan   Physical Therapy Plan  General Plan:  (5-6x/week)  Current Treatment Recommendations: Strengthening, ROM, Balance training, Functional mobility training, Transfer training, Gait training, Safety education & training, Home exercise program, Therapeutic activities, Patient/Caregiver education & training, Equipment evaluation, education, & procurement, Neuromuscular re-education, Endurance training  Safety Devices  Type of Devices: Bed alarm in place, Call light within reach, Gait belt, Left in bed, Nurse  Contact guard assistance  Bed Mobility Comments: Pt began session up in chair, retired to supine in bed at end of session, performed with CGA with HOB slightly elevated.  Transfers  Sit to Stand: Stand by assistance  Stand to Sit: Stand by assistance  Comment: Transfers performed with no AD multiple times throughout session, SBA provided d/t impulsivity and pt standing before therapist readiness.  Ambulation  Surface: Level tile  Device: Rolling Walker  Assistance: Stand by assistance  Quality of Gait: fair stability, decreased  strength on R side, occassional narrow ROWDY  Gait Deviations: Slow Clare;Decreased step length;Decreased step height  Distance: 50ft  Comments: Pt overall slow and steady with no significant LOB.  More Ambulation?: Yes  Ambulation 2  Surface - 2: level tile  Device 2: No device  Assistance 2: Contact guard assistance (for safety)  Quality of Gait 2: narrow ROWDY, fair stability  Gait Deviations: Slow Clare;Decreased step length;Decreased step height  Distance: 200ft x2  Comments: Pt overall slow and steady without use of AD during ambulation, no true LOB noted.  Stairs/Curb  Stairs?: No     Balance  Posture: Good  Sitting - Static: Good  Sitting - Dynamic: Fair  Standing - Static: Fair  Standing - Dynamic: Fair  Comments: standing balance assessed with and without AD.    Exercise Treatment:   Seated LE exercise program: Long Arc Quads, hip abduction/adduction, heel/toe raises, and marches. Reps: x10 BLE  Comments: Pt performed while seated EOB.       AM-PAC - Mobility  AM-PAC Basic Mobility - Inpatient   How much help is needed turning from your back to your side while in a flat bed without using bedrails?: A Little  How much help is needed moving from lying on your back to sitting on the side of a flat bed without using bedrails?: A Little  How much help is needed moving to and from a bed to a chair?: A Little  How much help is needed standing up from a chair using your arms?: A

## 2024-01-24 NOTE — PLAN OF CARE
Problem: Discharge Planning  Goal: Discharge to home or other facility with appropriate resources  1/24/2024 0514 by Loretta Meehan, RN  Outcome: Progressing     Problem: Safety - Adult  Goal: Free from fall injury  1/24/2024 0514 by Loretta Meehan, RN  Outcome: Progressing     Problem: Pain  Goal: Verbalizes/displays adequate comfort level or baseline comfort level  1/24/2024 0514 by Loretta Meehan, RN  Outcome: Progressing

## 2024-01-24 NOTE — PROGRESS NOTES
Endovascular Neurosurgery Progress Note    SUBJECTIVE:     NAEO    Review of Systems:  CONSTITUTIONAL:  negative for fevers, chills, fatigue and malaise    EYES:  negative for double vision, blurred vision and photophobia     HEENT:  negative for tinnitus, epistaxis and sore throat    RESPIRATORY:  negative for cough, shortness of breath, wheezing    CARDIOVASCULAR:  negative for chest pain, palpitations, syncope, edema    GASTROINTESTINAL:  negative for nausea, vomiting    GENITOURINARY:  negative for incontinence    MUSCULOSKELETAL:  negative for neck or back pain    NEUROLOGICAL:  Negative for weakness and tingling  negative for headaches and dizziness    PSYCHIATRIC:  negative for anxiety      Review of systems otherwise negative.      OBJECTIVE:     Vitals:    01/24/24 0433   BP: (!) 152/87   Pulse: 59   Resp: 15   Temp: 98.2 °F (36.8 °C)   SpO2: 96%        General:  Gen: normal habitus, NAD  HEENT: NCAT, mucosa moist  Cvs: RRR, S1 S2 normal  Resp: symmetric unlabored breathing  Abd: s/nd/nt  Ext: no edema  Skin: no lesions seen, warm and dry    Neuro:  Gen: awake and alert, oriented x3.   Lang/speech: no aphasia, very dysarthric. Follows all commands.  CN: PERRL, EOMI, VFF, V1-3 intact, face symmetric, hearing intact, shoulder shrug symmetric, tongue midline  Motor: grossly 5/5 UE and LE on the left, right arm 4+/5, right leg 4+/5 (no drift)   Sense: LT intact in all 4 ext.  Coord: FTN and HTS intact b/l  DTR: deferred  Gait: normal with a walker    NIH Stroke Scale:   1a  Level of consciousness: 0 - alert; keenly responsive   1b. LOC questions:  0 - answers both questions correctly   1c. LOC commands: 0 - performs both tasks correctly   2.  Best Gaze: 0 - normal   3. Visual: 0 - no visual loss   4. Facial Palsy: 1 - minor paralysis (flattened nasolabial fold, asymmetric on smiling)   5a. Motor left arm: 0 - no drift, limb holds 90 (or 45) degrees for full 10 seconds   5b.  Motor right arm: 0   6a. Motor

## 2024-01-24 NOTE — DISCHARGE INSTR - COC
Continuity of Care Form    Patient Name: Jagdeep Tolbert Sr.   :  1970  MRN:  [id number]    Admit date:  2024  Discharge date:  ***    Code Status Order: Full Code   Advance Directives:     Admitting Physician:  Kali Oneil DO  PCP: Giovanna Martinez APRN - CNP    Discharging Nurse: ***  Discharging Hospital Unit/Room#: 0143/0143-01  Discharging Unit Phone Number: ***    Emergency Contact:   Extended Emergency Contact Information  Primary Emergency Contact: Antonella Tolbert  Address: [place] [address]  Home Phone: [phone]  Relation: Parent  Secondary Emergency Contact: golden tolbert  Home Phone: [phone]  Relation: Brother/Sister  Preferred language: English   needed? No    Past Surgical History:  Past Surgical History:   Procedure Laterality Date    FINGER SURGERY         Immunization History:     There is no immunization history on file for this patient.    Active Problems:  Patient Active Problem List   Diagnosis Code    Uncontrolled hypertension I10    Chronic shoulder pain M25.519, G89.29    Acute low back pain M54.50    Cervical pain M54.2    Neck sprain S13.9XXA    Lumbar sprain S33.5XXA    DDD (degenerative disc disease), cervical M50.30    DDD (degenerative disc disease), lumbar M51.36    Smoker F17.200    Acquired spondylolisthesis M43.10    Intraparenchymal hemorrhage of brain (HCC) I61.9    Intracranial hemorrhage (HCC) I62.9    Hyponatremia E87.1    Alcohol withdrawal syndrome, with delirium (HCC) F10.931    Polysubstance abuse (HCC) F19.10    ETOH abuse F10.10    Vertebral artery occlusion, left I65.02    Asymptomatic vertebral artery stenosis, right I65.01       Isolation/Infection:   Isolation            No Isolation          Patient Infection Status       None to display            Nurse Assessment:  Last Vital Signs: BP (!) 148/83   Pulse 88   Temp 98 °F (36.7 °C) (Axillary)   Resp 23   Ht 1.803 m (5' 11\")   Wt 76.7 kg (169 lb)   SpO2 95%   BMI 23.57

## 2024-01-25 PROCEDURE — 6370000000 HC RX 637 (ALT 250 FOR IP): Performed by: NURSE PRACTITIONER

## 2024-01-25 PROCEDURE — 92507 TX SP LANG VOICE COMM INDIV: CPT

## 2024-01-25 PROCEDURE — 99232 SBSQ HOSP IP/OBS MODERATE 35: CPT | Performed by: PSYCHIATRY & NEUROLOGY

## 2024-01-25 PROCEDURE — 99232 SBSQ HOSP IP/OBS MODERATE 35: CPT | Performed by: PHYSICAL MEDICINE & REHABILITATION

## 2024-01-25 PROCEDURE — 2060000000 HC ICU INTERMEDIATE R&B

## 2024-01-25 PROCEDURE — 2580000003 HC RX 258

## 2024-01-25 PROCEDURE — 6360000002 HC RX W HCPCS: Performed by: NURSE PRACTITIONER

## 2024-01-25 PROCEDURE — 92526 ORAL FUNCTION THERAPY: CPT

## 2024-01-25 PROCEDURE — 97129 THER IVNTJ 1ST 15 MIN: CPT

## 2024-01-25 RX ADMIN — AMLODIPINE BESYLATE 10 MG: 10 TABLET ORAL at 09:16

## 2024-01-25 RX ADMIN — FOLIC ACID 1 MG: 1 TABLET ORAL at 09:16

## 2024-01-25 RX ADMIN — SODIUM CHLORIDE, PRESERVATIVE FREE 10 ML: 5 INJECTION INTRAVENOUS at 09:16

## 2024-01-25 RX ADMIN — ENOXAPARIN SODIUM 40 MG: 100 INJECTION SUBCUTANEOUS at 09:15

## 2024-01-25 RX ADMIN — SODIUM CHLORIDE, PRESERVATIVE FREE 10 ML: 5 INJECTION INTRAVENOUS at 19:54

## 2024-01-25 RX ADMIN — Medication 100 MG: at 09:15

## 2024-01-25 RX ADMIN — LISINOPRIL 40 MG: 20 TABLET ORAL at 09:15

## 2024-01-25 ASSESSMENT — ENCOUNTER SYMPTOMS
ABDOMINAL DISTENTION: 0
COLOR CHANGE: 0
DIARRHEA: 0
CHEST TIGHTNESS: 0
COUGH: 0
SHORTNESS OF BREATH: 0
ABDOMINAL PAIN: 0
NAUSEA: 0
BACK PAIN: 0

## 2024-01-25 ASSESSMENT — PAIN SCALES - GENERAL
PAINLEVEL_OUTOF10: 0

## 2024-01-25 NOTE — PLAN OF CARE
Problem: Discharge Planning  Goal: Discharge to home or other facility with appropriate resources  1/25/2024 1712 by Kymberly Gray, RN  Outcome: Progressing  Flowsheets (Taken 1/25/2024 0800)  Discharge to home or other facility with appropriate resources:   Identify barriers to discharge with patient and caregiver   Arrange for needed discharge resources and transportation as appropriate   Identify discharge learning needs (meds, wound care, etc)   Refer to discharge planning if patient needs post-hospital services based on physician order or complex needs related to functional status, cognitive ability or social support system   Arrange for interpreters to assist at discharge as needed  1/25/2024 0604 by Neli Sexton, RN  Outcome: Progressing  Flowsheets (Taken 1/24/2024 2000)  Discharge to home or other facility with appropriate resources: Identify barriers to discharge with patient and caregiver     Problem: Safety - Adult  Goal: Free from fall injury  1/25/2024 1712 by Kymberly Gray, RN  Outcome: Progressing  Flowsheets (Taken 1/25/2024 0734)  Free From Fall Injury: Instruct family/caregiver on patient safety  1/25/2024 0604 by Neli Sexton, RN  Outcome: Progressing  Flowsheets (Taken 1/24/2024 2311)  Free From Fall Injury: Instruct family/caregiver on patient safety     Problem: Pain  Goal: Verbalizes/displays adequate comfort level or baseline comfort level  1/25/2024 1712 by Kymberly Gray, RN  Outcome: Progressing  Flowsheets  Taken 1/25/2024 1558  Verbalizes/displays adequate comfort level or baseline comfort level:   Encourage patient to monitor pain and request assistance   Assess pain using appropriate pain scale   Administer analgesics based on type and severity of pain and evaluate response   Implement non-pharmacological measures as appropriate and evaluate response   Consider cultural and social influences on pain and pain management   Notify Licensed Independent  injury  Outcome: Progressing  Flowsheets (Taken 1/25/2024 5525)  Absence of Physical Injury: Implement safety measures based on patient assessment

## 2024-01-25 NOTE — PROGRESS NOTES
0 - no drift; leg holds 30 degree position for full 5 seconds   6b  Motor right le - no drift; leg holds 30 degree position for full 5 seconds   7. Limb Ataxia: 0 - absent   8.  Sensory: 1 - mild to moderate sensory loss; patient feels pinprick is less sharp or is dull on the affected side; there is a loss of superficial pain with pinprick but patient is aware of being touched    9. Best Language:  0 - no aphasia, normal   10. Dysarthria: 2 - severe; patient speech is so slurred as to be unintelligible in the absence of or our of proportion to any dysphagia, or is mute/anarthric    11. Extinction and Inattention: 0 - no abnormality         Total:   4     MRS: 2      LABS:   Reviewed.  Lab Results   Component Value Date    HGB 13.9 2024    WBC 7.4 2024     2024     (L) 2024    BUN 13 2024    CREATININE 0.6 (L) 2024    AST 24 2020    ALT 23 2020    APTT 27.8 2024    INR 0.9 2024      No results found for: \"COVID19\"    RADIOLOGY:   Images were personally reviewed including:    CTA head neck 2024: chronic left VA occlusion      R VA origin severe stenosis          ASSESSMENT:     52 yo male with history of HTN, alcoholism, and polysubstance abuse who presents with acute onset aphasia and right sided weakness. Initial -210. CT head showed acute left basal ganglia IPH, ICH score 0. 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     PLAN:     - no endovascular intervention for now  - aspirin 81mg when possible (ideally in 1-2 weeks post bleed)   - out patient follow up of R VA origin severe stenosis    - f/u with Dr. Estrella in 3 months    Case discussed with Dr. Estrella attending.    Milli Tam MD    Stroke, Neurocritical Care & Neurointervention  [facility]  [facility] [place]  Electronically signed 2024 at  6:38 AM

## 2024-01-25 NOTE — PLAN OF CARE
Problem: Discharge Planning  Goal: Discharge to home or other facility with appropriate resources  1/25/2024 0604 by Neli Sexton, RN  Outcome: Progressing  Flowsheets (Taken 1/24/2024 2000)  Discharge to home or other facility with appropriate resources: Identify barriers to discharge with patient and caregiver     Problem: Safety - Adult  Goal: Free from fall injury  1/25/2024 0604 by Neli Sexton, RN  Outcome: Progressing  Flowsheets (Taken 1/24/2024 2311)  Free From Fall Injury: Instruct family/caregiver on patient safety     Problem: Pain  Goal: Verbalizes/displays adequate comfort level or baseline comfort level  1/25/2024 0604 by Neli Sexton, RN  Outcome: Progressing  Flowsheets (Taken 1/25/2024 0015)  Verbalizes/displays adequate comfort level or baseline comfort level: Encourage patient to monitor pain and request assistance     Problem: Skin/Tissue Integrity  Goal: Absence of new skin breakdown  Description: 1.  Monitor for areas of redness and/or skin breakdown  2.  Assess vascular access sites hourly  3.  Every 4-6 hours minimum:  Change oxygen saturation probe site  4.  Every 4-6 hours:  If on nasal continuous positive airway pressure, respiratory therapy assess nares and determine need for appliance change or resting period.  1/25/2024 0604 by Neli Sexton, RN  Outcome: Progressing

## 2024-01-25 NOTE — PROGRESS NOTES
[facility]  Occupational Therapy Not Seen Note    DATE: 2024    NAME: Jagdeep Quintana .  MRN: [id number]   : 1970      Patient not seen this date for Occupational Therapy due to:    Patient Declined: Pt declines participation in OT this date. OT will check back tomorrow as able.    Next Scheduled Treatment:       Electronically signed by DUSTY Bañuelos on 2024 at 3:53 PM

## 2024-01-25 NOTE — PROGRESS NOTES
[facility]   IN-PATIENT SERVICE   [facility]    Progress Note             Date:   1/25/2024  Patient name:  Jagdeep Quintana Sr.  Date of admission:  1/14/2024  6:26 PM  MRN:   [id number]  Account:  [id number]  YOB: 1970  PCP:    Giovanna Martinez APRN - CNP  Room:   [address] [place] [address]  Code Status:    Full Code    Chief Complaint:     Chief Complaint   Patient presents with    Aphasia     LKW 1800    Extremity Weakness     RUE and RLE    Facial Droop     Right side       Interval hx:     Seen and examined this morning    Tolerating full diet    Awaiting placement    Brief History of Present Illness:     The patient is a 53 y.o.  Non- / non  male who presents with Aphasia (LKW 1800), Extremity Weakness (RUE and RLE), and Facial Droop (Right side)   and he is admitted to the hospital for the management of left basilar IPH  PMH significant for HTN, alcohol and polysubstance abuse.  Patient was brought in by EMS on 1/15 with complaints of right-sided muscle weakness, facial droop and dysarthria.  Upon arrival to ER he was found to be significantly hypertensive with SBP> 200.  Stat CT head was obtained and showed IPH left basal ganglia.  He was admitted under NICU for further management.  UDS came back positive for cocaine and methadone.  He initially was started on Cardene drip for the management of hypertension but subsequently was transitioned to oral therapy with lisinopril and Norvasc.  Control CT head the next day after admission showed no significant change in size.  Over his course in NICU patient has had symptoms of alcohol withdrawal that appears to be subsiding this time. The last dose of phenobarbital was given on 1/18 at 8 am           Past Medical History:     Past Medical History:   Diagnosis Date    Hypertension         Past Surgical History:     Past Surgical History:   Procedure Laterality Date    FINGER SURGERY          Medications Prior to Admission:  current medical therapies and most importantly because of direct risk to patient if care not provided in a hospital setting.    Dimitri Peoples MD  Neurology Resident PGY-2  1/25/2024  1:49 PM    Copy sent to Giovanna Carvalho, APRN - CNP

## 2024-01-25 NOTE — PROGRESS NOTES
Speech Language Pathology  [facility]    Cognitive/Language/Oral Motor Treatment Note    Date: 1/25/2024  Patient’s Name: Jagdeep Quintana Sr.  MRN: [id number]    Patient Active Problem List   Diagnosis Code    Uncontrolled hypertension I10    Chronic shoulder pain M25.519, G89.29    Acute low back pain M54.50    Cervical pain M54.2    Neck sprain S13.9XXA    Lumbar sprain S33.5XXA    DDD (degenerative disc disease), cervical M50.30    DDD (degenerative disc disease), lumbar M51.36    Smoker F17.200    Acquired spondylolisthesis M43.10    Intraparenchymal hemorrhage of brain (HCC) I61.9    Intracranial hemorrhage (HCC) I62.9    Hyponatremia E87.1    Alcohol withdrawal syndrome, with delirium (HCC) F10.931    Polysubstance abuse (HCC) F19.10    ETOH abuse F10.10    Vertebral artery occlusion, left I65.02    Asymptomatic vertebral artery stenosis, right I65.01       Pain: 0/10    Cognitive Treatment    Treatment time: 1499-4853      Subjective: [x] Alert [x] Cooperative     [] Confused     [] Agitated    [] Lethargic      Objective/Assessment:    Recall: Short-term recall of 3-units: 0/3 did not increase with max verbal cues, 0/3 increased to 1/3 with min-mod verbal cues, 0/3 did not increase with max verbal cues.   Memory and mental manipulation (word order--3 words): 3/5 increased to 5/5 when given first word and visual cues.    Problem Solving/Reasoning: State the category (concrete): 5/10 increased to 9/10 with min verbal cues, choice of 2, and repetition.   Adding to category: 8/10 increased to 10/10 with min verbal cues.  Multiple definitions: 4/10 increased to 8/10 with min-mod verbal cues.   Analogies: 4/10 increased to 9/10 with min verbal cues.    Dysphagia: Pt. started on reg diet with thin liquids with use of chin tuck as per speech therapy recommendations. Pt. provided with thin liquid trials. Education provided regarding chin tuck with thin liquid trials. Pt. verbalized understanding.

## 2024-01-25 NOTE — PROGRESS NOTES
Physical Medicine & Rehabilitation  Progress Note        Admitting Physician: Patricio Centeno DO    Primary Care Provider: Giovanna Martinez APRN - CNP     Chief Complaint: R sided weakness    Brief History:    This is a follow up to the initial consult on Mr. Jagdeep Quintana Sr. is a 53 y.o. right handed male who was admitted to [facility] on 1/14/2024 with Aphasia (LKW 1800), Extremity Weakness (RUE and RLE), and Facial Droop (Right side)    Patient admitted with R sided weakness for stroke alert with R facial droop, dysarthria, and aphasia. SBP >200 mm Hg on arrival to ED. Initial NIHSS 13. Diagnostic studies confirmed IPH L basal ganglia. He was started on Cardene drip for SBP < 140 mm Hg and admitted for neuro ICU management. He is in neuro stepdown now. Neurosurgery managed conservatively.     Neuro endovascular: following R vertebral artery severe stenosis and chronic L vertebral artery  occlusion. Recommending ASA 1-2 weeks after bleed, then for outpatient follow up with Dr. Estrella 3 months.    Subjective:  The patient is resting comfortably. The patient's mobility is improved. He has had significant improvement in his mobility and speech in past few days.       ROS:  Constitutional: negative for anorexia, chills, fatigue, fevers, sweats and weight loss  Eyes: negative for redness and visual disturbance  Ears, nose, mouth, throat, and face: negative for earaches, epistaxis, sore throat and tinnitus  Respiratory: negative for cough and shortness of breath  Cardiovascular: negative for chest pain, dyspnea and palpitations  Gastrointestinal: negative for abdominal pain, change in bowel habits, constipation, nausea and vomiting  Genitourinary:negative for dysuria, frequency, hesitancy and urinary incontinence  Integument/breast: negative for pruritus and rash  Musculoskeletal:negative for stiff joints  Neurological: negative for dizziness, headaches   Behavioral/Psych: negative for decreased appetite, depression

## 2024-01-25 NOTE — CARE COORDINATION
10:15 Spoke with Deandra at [facility], no precert as of yet, she will call Eloisa and call writer back  10:55 Call placed to Deandra, she has left message with [employer], await call back, she will call when they have precert. Deandra has verified ins, Eloisa is pts primary

## 2024-01-26 ENCOUNTER — APPOINTMENT (OUTPATIENT)
Dept: CT IMAGING | Age: 54
End: 2024-01-26
Payer: COMMERCIAL

## 2024-01-26 VITALS
RESPIRATION RATE: 18 BRPM | HEIGHT: 71 IN | HEART RATE: 68 BPM | OXYGEN SATURATION: 96 % | SYSTOLIC BLOOD PRESSURE: 142 MMHG | DIASTOLIC BLOOD PRESSURE: 80 MMHG | WEIGHT: 169 LBS | BODY MASS INDEX: 23.66 KG/M2 | TEMPERATURE: 98.3 F

## 2024-01-26 PROCEDURE — 97530 THERAPEUTIC ACTIVITIES: CPT

## 2024-01-26 PROCEDURE — 99239 HOSP IP/OBS DSCHRG MGMT >30: CPT | Performed by: PSYCHIATRY & NEUROLOGY

## 2024-01-26 PROCEDURE — 97116 GAIT TRAINING THERAPY: CPT

## 2024-01-26 PROCEDURE — 2580000003 HC RX 258

## 2024-01-26 PROCEDURE — 6370000000 HC RX 637 (ALT 250 FOR IP): Performed by: NURSE PRACTITIONER

## 2024-01-26 PROCEDURE — 70450 CT HEAD/BRAIN W/O DYE: CPT

## 2024-01-26 PROCEDURE — 94761 N-INVAS EAR/PLS OXIMETRY MLT: CPT

## 2024-01-26 PROCEDURE — 97129 THER IVNTJ 1ST 15 MIN: CPT

## 2024-01-26 PROCEDURE — 92507 TX SP LANG VOICE COMM INDIV: CPT

## 2024-01-26 RX ORDER — ASPIRIN 81 MG/1
81 TABLET ORAL DAILY
Qty: 90 TABLET | Refills: 5 | Status: SHIPPED | OUTPATIENT
Start: 2024-01-26

## 2024-01-26 RX ADMIN — Medication 100 MG: at 08:00

## 2024-01-26 RX ADMIN — AMLODIPINE BESYLATE 10 MG: 10 TABLET ORAL at 08:00

## 2024-01-26 RX ADMIN — FOLIC ACID 1 MG: 1 TABLET ORAL at 08:00

## 2024-01-26 RX ADMIN — LISINOPRIL 40 MG: 20 TABLET ORAL at 07:59

## 2024-01-26 RX ADMIN — SENNOSIDES AND DOCUSATE SODIUM 2 TABLET: 50; 8.6 TABLET ORAL at 07:59

## 2024-01-26 RX ADMIN — SODIUM CHLORIDE, PRESERVATIVE FREE 10 ML: 5 INJECTION INTRAVENOUS at 08:16

## 2024-01-26 ASSESSMENT — ENCOUNTER SYMPTOMS
SORE THROAT: 0
SHORTNESS OF BREATH: 0
BACK PAIN: 0
ABDOMINAL DISTENTION: 0
NAUSEA: 0
CHEST TIGHTNESS: 0
ABDOMINAL PAIN: 0
COLOR CHANGE: 0
COUGH: 0
DIARRHEA: 0

## 2024-01-26 NOTE — PROGRESS NOTES
[facility]   IN-PATIENT SERVICE   [facility]    Progress Note             Date:   1/26/2024  Patient name:  Jagdeep Quintana Sr.  Date of admission:  1/14/2024  6:26 PM  MRN:   [id number]  Account:  [id number]  YOB: 1970  PCP:    Giovanna Martinez APRN - CNP  Room:   [address] [place] [address]  Code Status:    Full Code    Chief Complaint:     Chief Complaint   Patient presents with    Aphasia     LKW 1800    Extremity Weakness     RUE and RLE    Facial Droop     Right side       Interval hx:     Seen and examined    Patient was restless to leave    No other medical/neurological issues, patient cleared for dc and awaiting     Update: discharged with home care    Brief History of Present Illness:     The patient is a 53 y.o.  Non- / non  male who presents with Aphasia (LKW 1800), Extremity Weakness (RUE and RLE), and Facial Droop (Right side)   and he is admitted to the hospital for the management of left basilar IPH  PMH significant for HTN, alcohol and polysubstance abuse.  Patient was brought in by EMS on 1/15 with complaints of right-sided muscle weakness, facial droop and dysarthria.  Upon arrival to ER he was found to be significantly hypertensive with SBP> 200.  Stat CT head was obtained and showed IPH left basal ganglia.  He was admitted under NICU for further management.  UDS came back positive for cocaine and methadone.  He initially was started on Cardene drip for the management of hypertension but subsequently was transitioned to oral therapy with lisinopril and Norvasc.  Control CT head the next day after admission showed no significant change in size.  Over his course in NICU patient has had symptoms of alcohol withdrawal that appears to be subsiding this time. The last dose of phenobarbital was given on 1/18 at 8 am           Past Medical History:     Past Medical History:   Diagnosis Date    Hypertension         Past Surgical History:     Past Surgical History:  of co-morbidities listed above, severity of signs and symptoms as outlined, requirement for current medical therapies and most importantly because of direct risk to patient if care not provided in a hospital setting.    Dimitri Peoples MD  Neurology Resident PGY-2  1/26/2024  9:38 AM    Copy sent to Giovanna Carvalho, APRN - CNP

## 2024-01-26 NOTE — PROGRESS NOTES
Endovascular Neurosurgery Progress Note    SUBJECTIVE:     NAEO    Review of Systems:  CONSTITUTIONAL:  negative for fevers, chills, fatigue and malaise    EYES:  negative for double vision, blurred vision and photophobia     HEENT:  negative for tinnitus, epistaxis and sore throat    RESPIRATORY:  negative for cough, shortness of breath, wheezing    CARDIOVASCULAR:  negative for chest pain, palpitations, syncope, edema    GASTROINTESTINAL:  negative for nausea, vomiting    GENITOURINARY:  negative for incontinence    MUSCULOSKELETAL:  negative for neck or back pain    NEUROLOGICAL:  Negative for weakness and tingling  negative for headaches and dizziness    PSYCHIATRIC:  negative for anxiety      Review of systems otherwise negative.      OBJECTIVE:     Vitals:    01/26/24 1141   BP: 139/83   Pulse: 74   Resp: 17   Temp: 98.2 °F (36.8 °C)   SpO2: 96%        General:  Gen: normal habitus, NAD  HEENT: NCAT, mucosa moist  Cvs: RRR, S1 S2 normal  Resp: symmetric unlabored breathing  Abd: s/nd/nt  Ext: no edema  Skin: no lesions seen, warm and dry    Neuro:  Gen: awake and alert, oriented x3.   Lang/speech: no aphasia, very dysarthric. Follows all commands.  CN: PERRL, EOMI, VFF, V1-3 intact, face symmetric, hearing intact, shoulder shrug symmetric, tongue midline  Motor: grossly 5/5 UE and LE on the left, right arm 4+/5, right leg 4+/5 (no drift)   Sense: LT intact in all 4 ext.  Coord: FTN and HTS intact b/l  DTR: deferred  Gait: normal with a walker    NIH Stroke Scale:   1a  Level of consciousness: 0 - alert; keenly responsive   1b. LOC questions:  0 - answers both questions correctly   1c. LOC commands: 0 - performs both tasks correctly   2.  Best Gaze: 0 - normal   3. Visual: 0 - no visual loss   4. Facial Palsy: 1 - minor paralysis (flattened nasolabial fold, asymmetric on smiling)   5a. Motor left arm: 0 - no drift, limb holds 90 (or 45) degrees for full 10 seconds   5b.  Motor right arm: 0   6a. Motor left

## 2024-01-26 NOTE — PROGRESS NOTES
Speech Language Pathology  [facility]    Cognitive and Dysarthria Treatment Note    Date: 1/26/2024  Patient’s Name: Jagdeep Quintana Sr.  MRN: [id number]    Patient Active Problem List   Diagnosis Code    Uncontrolled hypertension I10    Chronic shoulder pain M25.519, G89.29    Acute low back pain M54.50    Cervical pain M54.2    Neck sprain S13.9XXA    Lumbar sprain S33.5XXA    DDD (degenerative disc disease), cervical M50.30    DDD (degenerative disc disease), lumbar M51.36    Smoker F17.200    Acquired spondylolisthesis M43.10    Intraparenchymal hemorrhage of brain (HCC) I61.9    Intracranial hemorrhage (HCC) I62.9    Hyponatremia E87.1    Alcohol withdrawal syndrome, with delirium (HCC) F10.931    Polysubstance abuse (HCC) F19.10    ETOH abuse F10.10    Vertebral artery occlusion, left I65.02    Asymptomatic vertebral artery stenosis, right I65.01       Pain: 0/10    Cognitive Treatment    Treatment time: 5689-6707      Subjective: [x] Alert [x] Cooperative     [] Confused     [] Agitated    [] Lethargic      Objective/Assessment:    Recall: Delayed recall of 3-units (visual stimuli): 0/3 increased to 1/3 with min verbal cues, 0/3 increased to 3/3 with min-mod verbal cues, 0/3 increased to 3/3 with min verbal cues.   Auditory memory (5-6-word sentences): 10/10     Organization: Sequencing: 3/5 increased to 5/5 with min verbal cues and repetition.    Problem Solving/Reasoning: Multiple uses for objects: 14/18 increased to 18/18 with min verbal cues and repetition.  State the category (concrete): 8/10 increased to 10/10 with min-mod verbal cues.  Adding to category: 4/10 increased to 10/10 with min verbal cues and repetition.    Other: Pt. Seen for O/M treatment program for dysarthria.  Pt. Completed O/M exercises X 3-5 X 4 sets with min-mod visual and verbal cues. Note fatigue as exercises progressed. Education provided re: compensatory strategies to increase speech intelligibility/clarity.

## 2024-01-26 NOTE — PROGRESS NOTES
Physical Therapy  Facility/Department: [address] STEPDOWN  Physical Therapy Daily Treatment Note    Name: Jagdeep Quintana Sr.  : 1970  MRN: [id number]  Date of Service: 2024    Discharge Recommendations:  Patient would benefit from continued therapy after discharge   PT Equipment Recommendations  Equipment Needed: No      Patient Diagnosis(es): The primary encounter diagnosis was Intracranial hemorrhage (HCC). A diagnosis of Intraparenchymal hemorrhage of brain (HCC) was also pertinent to this visit.  Past Medical History:  has a past medical history of Hypertension.  Past Surgical History:  has a past surgical history that includes Finger surgery.    Assessment   Body Structures, Functions, Activity Limitations Requiring Skilled Therapeutic Intervention: Decreased functional mobility ;Decreased cognition;Decreased coordination;Decreased endurance;Decreased balance;Decreased strength;Decreased safe awareness  Assessment: Pt ambulated 400ft with no AD and CGA, also performed 1 flight of stairs with no AD and CGA provided for safety. Pt would benefit from continued PT to address deficits and maximize safety and independence with mobility.  Therapy Prognosis: Good  Requires PT Follow-Up: Yes  Activity Tolerance  Activity Tolerance: Patient tolerated treatment well     Plan   Physical Therapy Plan  General Plan:  (5-6x/week)  Current Treatment Recommendations: Strengthening, ROM, Balance training, Functional mobility training, Transfer training, Gait training, Safety education & training, Home exercise program, Therapeutic activities, Patient/Caregiver education & training, Equipment evaluation, education, & procurement, Neuromuscular re-education, Endurance training  Safety Devices  Type of Devices: Call light within reach, Gait belt, Left in bed, Nurse notified  Restraints  Restraints Initially in Place: No  Restraints: .     Restrictions  Restrictions/Precautions  Restrictions/Precautions: Seizure, Fall  Risk  Required Braces or Orthoses?: No  Position Activity Restriction  Other position/activity restrictions: SBP<160; R VA severe stenosis and chronic left VA occlusion; Aphasia     Subjective   General  Patient assessed for rehabilitation services?: Yes  Response To Previous Treatment: Patient with no complaints from previous session.  Family / Caregiver Present: No  Follows Commands: Within Functional Limits  General Comment  Comments: Pt retired to supine in bed at end of session with call light in reach.  Subjective  Subjective: Pt and RN agreeable to PT this morning. Pt seated in chair upon arrival with no c/o pain. Pt pleasant and cooperative throughout session, eager to go home.       Cognition   Orientation  Overall Orientation Status: Within Functional Limits  Orientation Level: Oriented X4  Cognition  Overall Cognitive Status: Exceptions  Arousal/Alertness: Appropriate responses to stimuli  Following Commands: Follows multistep commands with increased time;Follows multistep commands with repitition  Attention Span: Appears intact  Safety Judgement: Decreased awareness of need for assistance;Decreased awareness of need for safety  Problem Solving: Decreased awareness of errors;Assistance required to correct errors made;Assistance required to generate solutions;Assistance required to implement solutions  Insights: Fully aware of deficits  Initiation: Requires cues for some  Sequencing: Requires cues for some  Cognition Comment: Pt demos poor safety awareness and impulsivity, Required verbal cues throughout for safety. Pt aphasic, requiring increased time to express want/needs.     Objective   Bed mobility  Supine to Sit: Unable to assess  Sit to Supine: Stand by assistance  Scooting: Stand by assistance  Bed Mobility Comments: Pt began session up in chair, retired to supine in bed at end of session, performed with SBA with HOB slightly elevated.  Transfers  Sit to Stand: Stand by assistance  Stand to Sit:

## 2024-01-26 NOTE — PLAN OF CARE
Problem: Discharge Planning  Goal: Discharge to home or other facility with appropriate resources  1/26/2024 0514 by Neli Setxon, RN  Outcome: Progressing  Flowsheets (Taken 1/25/2024 1956)  Discharge to home or other facility with appropriate resources: Identify barriers to discharge with patient and caregiver     Problem: Safety - Adult  Goal: Free from fall injury  1/26/2024 0514 by Neli Sexton, RN  Outcome: Progressing  Flowsheets (Taken 1/25/2024 1921)  Free From Fall Injury: Instruct family/caregiver on patient safety     Problem: Pain  Goal: Verbalizes/displays adequate comfort level or baseline comfort level  1/26/2024 0514 by Neli Sexton, RN  Outcome: Progressing     Problem: Skin/Tissue Integrity  Goal: Absence of new skin breakdown  Description: 1.  Monitor for areas of redness and/or skin breakdown  2.  Assess vascular access sites hourly  3.  Every 4-6 hours minimum:  Change oxygen saturation probe site  4.  Every 4-6 hours:  If on nasal continuous positive airway pressure, respiratory therapy assess nares and determine need for appliance change or resting period.  1/26/2024 0514 by Neli Sexton, RN  Outcome: Progressing     Problem: ABCDS Injury Assessment  Goal: Absence of physical injury  1/26/2024 0514 by Neli Sexton, RN  Outcome: Progressing  Flowsheets (Taken 1/25/2024 1921)  Absence of Physical Injury: Implement safety measures based on patient assessment  1/25/2024 1712 by Kymberly Gray, RN  Outcome: Progressing  Flowsheets (Taken 1/25/2024 0734)  Absence of Physical Injury: Implement safety measures based on patient assessment

## 2024-02-07 ENCOUNTER — HOSPITAL ENCOUNTER (OUTPATIENT)
Dept: PHYSICAL THERAPY | Age: 54
Setting detail: THERAPIES SERIES
Discharge: HOME | End: 2024-02-07
Payer: COMMERCIAL

## 2024-02-07 PROCEDURE — 97162 PT EVAL MOD COMPLEX 30 MIN: CPT

## 2024-02-07 NOTE — CONSULTS
shoulder flexion and right elbow flexion strength improve to 4+/5  ? Function: Patient able to stand upright during walking  Patient to be independent with home exercise program as demonstrated by performance with correct form without cues.  Demonstrate Knowledge of fall prevention  LTG: (to be met in 16 treatments)  Right sided body pain improve to 1/10 or less at max after 60 minutes of physical activity   Right hip drop to resolve with gait  Control of right arm to be equal to left arm  Right hip flex/ext and right ankle DF/PF improve to 4+/5.  Stroke Impact Scale 16 improve to 10% functionally impaired or less.    Patient goals: \"To be back to normal\"    Rehab Potential:  [x] Good  [] Fair  [] Poor   Suggested Professional Referral:  [x] No  [] Yes:  Barriers to Goal Achievement:  [] No  [x] Yes: Patient continues to smoke and drink, though he reports \"less\" than pre-stroke  Domestic Concerns:  [x] No  [] Yes:    Pt. Education:  [x] Plans/Goals, Risks/Benefits discussed  [] Home exercise program  Method of Education: [x] Verbal  [] Demo  [] Written -- POC only based on insurance restrictions  Comprehension of Education:  [x] Verbalizes understanding.  [] Demonstrates understanding.  [] Needs Review.  [] Demonstrates/verbalizes understanding of HEP/Ed previously given.    Treatment Plan:  [x] Therapeutic Exercise   52938  [] Iontophoresis: 4 mg/mL Dexamethasone Sodium Phosphate  mAmin  37690   [x] Therapeutic Activity  73956 [] Vasopneumatic cold with compression  10584    [x] Gait Training   17261 [] Ultrasound   88049   [x] Neuromuscular Re-education  85824 [x] Electrical Stimulation Unattended  52151   [x] Manual Therapy  31225 [x] Electrical Stimulation Attended  85811   [x] Instruction in HEP  [] Lumbar/Cervical Traction  90721   [] Aquatic Therapy   86091 [x] Cold/hotpack    [] Massage   05952      [] Dry Needling, 1 or 2 muscles  24189   [] Biofeedback, first 15 minutes   94321  [] Biofeedback,

## 2024-02-08 ENCOUNTER — HOSPITAL ENCOUNTER (OUTPATIENT)
Dept: SPEECH THERAPY | Age: 54
Setting detail: THERAPIES SERIES
Discharge: HOME | End: 2024-02-08
Payer: COMMERCIAL

## 2024-02-08 ENCOUNTER — HOSPITAL ENCOUNTER (OUTPATIENT)
Dept: OCCUPATIONAL THERAPY | Age: 54
Setting detail: THERAPIES SERIES
Discharge: HOME | End: 2024-02-08
Payer: COMMERCIAL

## 2024-02-08 NOTE — FLOWSHEET NOTE
[facility] Fall Risk Assessment    Patient Name:  Jagdeep Quintana Sr.  : 1970    Risk Factor Scale  Score   History of Falls [x] Yes  [] No 25  0 25   Secondary Diagnosis [x] Yes  [] No 15  0 15   Ambulatory Aid [] Furniture  [] Crutches/cane/walker  [x] None/bedrest/wheelchair/nurse 30  15  0 0   IV/Heparin Lock [] Yes  [x] No 20  0 0   Gait/Transferring [] Impaired  [x] Weak  [] Normal/bedrest/immobile 20  10  0 10   Mental Status [] Forgets limitations  [x] Oriented to own ability 15  0 0      Total: 50     Based on the Assessment score: check the appropriate box.    []  No intervention needed   Low =   Score of 0-24    []  Use standard prevention interventions Moderate =  Score of 24-44   [] Give patient handout and discuss fall prevention strategies   [] Establish goal of education for patient/family RE: fall prevention strategies    [x]  Use high risk prevention interventions High = Score of 45 and higher   [x] Give patient handout and discuss fall prevention strategies   [x] Establish goal of education for patient/family Re: fall prevention strategies   [] Discuss lifeline / other resources -- N/A    Electronically signed by:   Nataliya Rangel, PT  Date: 2024

## 2024-02-08 NOTE — FLOWSHEET NOTE
[x] [facility]       Occupational Therapy       [address]       Phone: [phone]       Fax: [phone] [] [facility] Occupational  Therapy at [place]       [address]       Phone: [phone]       Fax: [phone] [] [facility]  Outpatient Rehabilitation &  Therapy  [address]  P: [phone]  F: [phone]           Occupational Therapy Cancel/No Show note    Date: 2024  Patient: Jagdeep Quintana Sr.  : 1970  MRN: [id number]  Cancels/No Shows to date:     For today's appointment patient:  []  Cancelled  []  Rescheduled appointment  [x]  No-show     Reason given by patient:  []  Patient ill  []  Conflicting appointment  []  No transportation    []  Conflict with work  []  No reason given  []  Other:     Comments: Speech therapist called patient twice with no answer or response.     Electronically signed by: ERIN Frey/MAGDIEL

## 2024-02-08 NOTE — SIGNIFICANT EVENT
[x] [facility]  Outpatient Rehabilitation &  Therapy  [address]  [phone]  [phone] [] [facility]  Outpatient Rehabilitation &  Therapy  [address]  P: [phone]  F: [phone] [] [facility]  Outpatient Rehabilitation &  Therapy  [address]  P: [phone]  F: [phone] [] [facility]  Outpatient Rehabilitation &  Therapy  [address]  [phone]  [phone] [] [facility]  Outpatient Rehabilitation &  Therapy  [address]   P: [phone]  F: [phone]  [] [facility]  Outpatient Rehabilitation &  Therapy  [address]  P: [phone]  F: [phone] [] [facility]  Outpatient Rehabilitation &  Therapy  [address]  P: [phone]  F: [phone] [] [facility]  Outpatient Rehabilitation &  Therapy  [address]  P: [phone]  F: [phone] [] [facility]  Outpatient Rehabilitation &  Therapy  [address]  P: [phone]  F: [phone]  [] [facility] Outpatient Rehabilitation &  Therapy  [address]  P: [phone]  F: [phone]     Speech Therapy Cancel/No Show note    Date: 2024  Patient: Jagdeep Quintana Sr.  : 1970  MRN: [id number]    Cancels/No Shows to date:     For today's appointment patient:    []  Cancelled    [] Rescheduled appointment    [x] No-show     Reason given by patient:    []  Patient ill    []  Conflicting appointment    [] No transportation      [] Conflict with work    [x] No reason given    [] Weather related    [] COVID-19    [x] Other:      Comments:  Clinician called patient twice with no response. Will follow-up.       [] Next appointment was confirmed    Electronically signed by: Carmelita Dunbar, SLP

## 2024-02-14 ENCOUNTER — HOSPITAL ENCOUNTER (OUTPATIENT)
Dept: PHYSICAL THERAPY | Age: 54
Setting detail: THERAPIES SERIES
Discharge: HOME | End: 2024-02-14
Payer: COMMERCIAL

## 2024-02-14 PROCEDURE — 97110 THERAPEUTIC EXERCISES: CPT

## 2024-02-14 NOTE — FLOWSHEET NOTE
[x] [facility]  Outpatient Rehabilitation &  Therapy  [address]  [phone]  [phone]     Physical Therapy Daily Treatment Note    Date:  2024  Patient Name:  Jagdeep Quintana Sr.    :  1970  MRN: [id number]  Physician:  Maribell Dai MD (hospitalist)  [facility] - phone: [phone]  (sees Dr. Ro today, 24) Fax [phone]  Insurance: Julissa Amin, [facility]; 20 visits hard max,     Approval was received for 8 visits, from 24 to 24.  Authorization number [id number]   Medical Diagnosis:   I62.9 (ICD-10-CM) - Intracranial hemorrhage (HCC)   I61.9 (ICD-10-CM) - Intraparenchymal hemorrhage of brain (HCC)   Rehab Codes: R 53.1, M 62.81, R 29.3, Z 91.81, R 26.29,   Onset date: 24                 Next 's appt.: 24; Dr. Estrella , Dr. Weems     Visit# / total visits:  (8 visits per insurance auth);      Cancels/No Shows: 0/0    Subjective:    Pain:  [] Yes  [x] No    Location: Right thigh and right side of body/arm          Pain Rating: (0-10 scale) 0/10  Pain altered Tx:  [] Yes  [x] No  Action:  Comments: Patient reports no pain at arrival. Mild unsteadiness ambulating from front waiting room to clinic area. Patient's girlfriend presents hard printed copies of referrals for all three disciplines.     Objective:  Modalities:   Precautions:   Exercises:  Exercise Reps/ Time Weight/ Level Comments   Scifit 5m L3          Standing      Gastroc stretch 3x30\"     Hip ABD 15x Blue    Hip EXT 15x Blue    Heel raises 2x10  Mirror    SL heel raises 2x10  RLE   TKE            Corner stretch 3x30\"     DB Flexion 2x10 3lb    DB ABD 2x10 3lb          BITS   Add next for UE coordination         Supine      Bridges 2x10  Cues for glut activation         Prone      Prone hip ext 10x  Tactile cues for glut activation prior to lift  RLE only   Prone knee flexion 15x ea               Sidelying         Hip ABD  15x

## 2024-02-21 ENCOUNTER — HOSPITAL ENCOUNTER (OUTPATIENT)
Dept: OCCUPATIONAL THERAPY | Age: 54
Setting detail: THERAPIES SERIES
Discharge: HOME | End: 2024-02-21
Payer: COMMERCIAL

## 2024-02-21 PROCEDURE — 97166 OT EVAL MOD COMPLEX 45 MIN: CPT

## 2024-02-21 NOTE — CONSULTS
[x] [facility]  Outpatient Rehabilitation &  Therapy  [address]  [phone]  F: [phone] [] [facility]  Outpatient Rehabilitation &  Therapy  [address]  P: [phone]  F: [phone] [] [facility]  Outpatient Rehabilitation &  Therapy  [address]  P: [phone]  F: [phone] [] [facility]  Outpatient Rehabilitation &  Therapy  [address]   P: [phone]  F: [phone] [] [facility]   Outpatient Rehabilitation   & Therapy  [address]  P: [phone]   F: [phone]       Occupational Therapy Hand & Upper Extremity  Initial Evaluation    Date: 2024      Patient: Jagdeep Quintana Sr.  : 1970  MRN: [id number]  Referring Provider:  Maribell Dai MD  Insurance: Other, [place] AFTER EVAL  Medical Diagnosis: I61.9 (ICD-10-CM) - Intraparenchymal hemorrhage of brain,  I62.9 (ICD-10-CM) - Intracranial hemorrhage   Rehab Codes: numbness R20.2,, lack of coordination R27.8,, fine motor skills loss R29.818,, muscle weakness generalized M62.81,, or muscle wasting of hand M62.54,   Onset Date: 24    Next  Appt: Dr. Estrella 24, Dr. Weems 24    Subjective:   CC: Patient reports biggest complaint is R hand weakness.   HPI: Patient referred to outpatient OT for intracranial hemorrhage. (24) Patient's brother called EMS - couldn't move right side, could not talk or get up, and right side of face was droopy. Patient had blood on brain, and had a burst from BP being too high. Patient smokes and drinks. Did not have home therapy, went home on 2024.     Mechanism of Injury: CVA  Surgery Date: NA  Precautions: None    Involved Extremity: Right   Dominant Extremity: Right    Past Medical History: Refer to Epic and HTN          Comorbidities: Stroke    Medications: Refer to Medical chart in [facility]  Allergies:  Refer to Medical chart in

## 2024-02-22 ENCOUNTER — APPOINTMENT (OUTPATIENT)
Dept: OCCUPATIONAL THERAPY | Age: 54
End: 2024-02-22
Payer: COMMERCIAL

## 2024-02-22 ENCOUNTER — HOSPITAL ENCOUNTER (OUTPATIENT)
Dept: PHYSICAL THERAPY | Age: 54
Setting detail: THERAPIES SERIES
Discharge: HOME | End: 2024-02-22
Payer: COMMERCIAL

## 2024-02-22 PROCEDURE — 97110 THERAPEUTIC EXERCISES: CPT

## 2024-02-22 NOTE — FLOWSHEET NOTE
[x] [facility]  Outpatient Rehabilitation &  Therapy  [address]  [phone]  [phone]     Physical Therapy Daily Treatment Note    Date:  2024  Patient Name:  Jagdeep Quintana Sr.    :  1970  MRN: [id number]  Physician:  Maribell Dai MD (hospitalist)  PCP is Dr. Francisco Ro at [facility] - phone: [phone]  (sees Dr. Ro today, 24) Fax [phone]  Insurance: Julissa Amin, [facility] Medicaid; 20 visits hard max,     Approval was received for 8 visits, from 24 to 24.  Authorization number [id number]   Medical Diagnosis:   I62.9 (ICD-10-CM) - Intracranial hemorrhage (HCC)   I61.9 (ICD-10-CM) - Intraparenchymal hemorrhage of brain (HCC)   Rehab Codes: R 53.1, M 62.81, R 29.3, Z 91.81, R 26.29  Onset date: 24                 Next 's appt.: 24; Dr. Estrella , Dr. Weems     Visit# / total visits: 3/8 (8 visits per insurance auth);      Cancels/No Shows: 0/0    Subjective:    Pain:  [] Yes  [x] No    Location: Right thigh and right side of body/arm          Pain Rating: (0-10 scale) 0/10  Pain altered Tx:  [] Yes  [x] No  Action:  Comments: Patient reports no pain this date.     Objective:  Modalities:   Precautions:   Exercises:  Exercise Reps/ Time Weight/ Level Comments   Scifit 5m L3          Standing      Gastroc stretch 3x30\"     Hip ABD 15x Blue    Hip EXT 15x Blue    Heel raises 2x10  No Mirror used     SL heel raises 2x10  RLE   TKE            Corner stretch 3x30\"     DB Flexion 2x10 3lb    DB ABD 2x10 3lb    Wall taps  2x5 Lime Added   Up, out, down = 1         BITS   Add next for UE coordination         Supine      Bridges 2x10  Cues for glut activation and increased ROM         Prone      Prone hip ext 10x  Tactile cues for glut activation prior to lift  RLE only   Prone knee flexion 15x ea               Sidelying         Hip ABD  15x         Clamshells 15x Blue    Other:       Treatment Charges: Mins Units   []

## 2024-02-23 NOTE — PRE-CERTIFICATION NOTE
[x] [facility]  Outpatient Rehabilitation &  Therapy  [address]  [phone]  [phone] [] [facility]  Outpatient Rehabilitation &  Therapy  [address]  P: [phone]  F: [phone] [] [facility]  Outpatient Rehabilitation &  Therapy  [address]  P: [phone]  F: [phone] [] [facility]  Outpatient Rehabilitation &  Therapy  [address]  [phone]  [phone] [] [facility]  Outpatient Rehabilitation &  Therapy  [address]   P: [phone]  F: [phone]  [] [facility]  Outpatient Rehabilitation &  Therapy  [address]  P: [phone]  F: [phone] [] [facility]  Outpatient Rehabilitation &  Therapy  [address]  P: [phone]  F: [phone] [] [facility]  Outpatient Rehabilitation &  Therapy  [address]  P: [phone]  F: [phone] [] [facility]  Outpatient Rehabilitation &  Therapy  [address]  P: [phone]  F: [phone]  [] [facility] Outpatient Rehabilitation &  Therapy  [address]  P: [phone]  F: [phone]          Therapy Pre-certification Note      2/23/2024    Jagdeep Quintana Sr.  1970   [id number]      Insurance approval was received for Occupational Therapy from [employer] on 2/22/2024.  Approval was received for 16 visits, from 2/26/2024 to 5/26/2024.  Authorization number [id number].    Patient was contacted to be scheduled and is scheduled 2/29/24.      Electronically signed by Nataliya Rangel PT on 2/23/2024 at 7:33 AM

## 2024-02-28 ENCOUNTER — HOSPITAL ENCOUNTER (OUTPATIENT)
Dept: PHYSICAL THERAPY | Age: 54
Setting detail: THERAPIES SERIES
Discharge: HOME | End: 2024-02-28
Payer: COMMERCIAL

## 2024-02-28 PROCEDURE — 97110 THERAPEUTIC EXERCISES: CPT

## 2024-02-28 NOTE — FLOWSHEET NOTE
[x] [facility]  Outpatient Rehabilitation &  Therapy  [address]  [phone]  [phone]     Physical Therapy Daily Treatment Note    Date:  2024  Patient Name:  Jagdeep Quintana Sr.    :  1970  MRN: [id number]  Physician:  Maribell Dai MD (hospitalist)  PCP is Dr. Francisco Ro at [facility] - phone: [phone]  (sees Dr. Ro today, 24) Fax [phone]  Insurance: Julissa Amin, [facility] Medicaid; 20 visits hard max,     Approval was received for 8 visits, from 24 to 24.  Authorization number [id number]   Medical Diagnosis:   I62.9 (ICD-10-CM) - Intracranial hemorrhage (HCC)   I61.9 (ICD-10-CM) - Intraparenchymal hemorrhage of brain (HCC)   Rehab Codes: R 53.1, M 62.81, R 29.3, Z 91.81, R 26.29  Onset date: 24                 Next 's appt.: 24; Dr. Estrella , Dr. Weems     Visit# / total visits:  (8 visits per insurance auth);      Cancels/No Shows: 0/0    Subjective:    Pain:  [] Yes  [x] No    Location: Right thigh and right side of body/arm          Pain Rating: (0-10 scale) 0/10  Pain altered Tx:  [] Yes  [x] No  Action:  Comments:  Patient reports no pain. When questioned what he is keeping up with at home, patient doesn't have specific answer. Reports he doesn't necessarily feel improvements since starting therapy, but \"nothing is worse\".     Objective:  Modalities:   Precautions:   Exercises:  Exercise Reps/ Time Weight/ Level Comments   Scifit 5m L3          Standing      Gastroc stretch 3x30\"     Hip ABD 15x Blue    Hip EXT 15x Blue    Heel raises 2x20  Alternating with double leg  Increased reps    SL heel raises - RLE 2x20  \"  Increased reps    Modified SL stance w/ DB twist 15x 4lb Added    Modified SL stance w/ DB flexion 15x 4lb Added          Corner stretch 3x30\"     DB Flexion 2x10 3lb    DB ABD 2x10 3lb    Wall taps  2x5 Lime Added   Up, out, down = 1         BITS 10 min  Visual scanning  -

## 2024-02-29 ENCOUNTER — HOSPITAL ENCOUNTER (OUTPATIENT)
Dept: PHYSICAL THERAPY | Age: 54
Setting detail: THERAPIES SERIES
Discharge: HOME | End: 2024-02-29
Payer: COMMERCIAL

## 2024-02-29 ENCOUNTER — HOSPITAL ENCOUNTER (OUTPATIENT)
Dept: OCCUPATIONAL THERAPY | Age: 54
Setting detail: THERAPIES SERIES
Discharge: HOME | End: 2024-02-29
Payer: COMMERCIAL

## 2024-02-29 PROCEDURE — 97110 THERAPEUTIC EXERCISES: CPT

## 2024-02-29 NOTE — FLOWSHEET NOTE
Translation 1 series  Added, Completed    Digi-Flex 50 reps blue Added, Completed    Flexbar 10 reps green Added, Completed    Power Web- composite fist 30 reps red Added, Completed    O'Ede Pegboard 3 rows In/out without tweezers  Added, Completed   Other:      Assessment: [x] Progressing toward goals. Foam cube issued for HEP, verbalized/demonstrated good knowledge and understanding. Significant muscle wasting observed in thenar eminence of R hand. Strength and FMC most limited. Forearm, wrist, hand, thumb and digits 2-5 ROM WFL, able to form full composite fist and oppose to all digits. Introduced to additional strengthening and FMC exercises, easily frustrated when dropping objects or taking increased time to complete activities, few rest breaks needed due to fatigue. Patient to PT.    [] No change.  [] Other     [x] Patient would continue to benefit from skilled occupational therapy services in order to: Improve and Maximize  functional /grasp, Strength, and Coordination deficit in order to Ensure safe return to work, perform ADLs with increased safety, and improve functional use of UE in ADL performance     Short Term Goals: (  8    Treatments)  Increase R  strength to 60 pounds and all R pinches by 5 pounds for ease with daily activities   Increase R FMC as evidenced by a decrease of 15 seconds on 9 hole peg test   Increase function:UE Functional Index Score 58/80 or more points to promote increased functional abilities  Patient to be independent with home exercise program as demonstrated by performance with correct form without cues     Long Term Goals: (  16  Treatments)  Increase R  strength to 70 pounds and all R pinches by 3 pounds for ease with daily activities   Increase R FMC as evidenced by a decrease of 10 seconds on 9 hole peg test   Increase function:UE Functional Index Score 70/80 or more points to promote increased functional abilities     Patient Goals: \"get my right side

## 2024-02-29 NOTE — FLOWSHEET NOTE
Prone      Prone hip ext 10x  Tactile cues for glut activation prior to lift  RLE only   Prone knee flexion 15x ea               Sidelying         Hip ABD  15x         Clamshells 15x Blue    Other:       Treatment Charges: Mins Units   []  Modalities     [x]  Ther Exercise 40 3   []  Manual Therapy     []  Ther Activities     []  Neuro Re-ed     []  Vasocompression     [] Gait     []  Other     Total Billable time 40 3       Assessment: [x] Progressing toward goals. Continue with balance interventions initiated at previous treatment with improved technique and tolerance this date. Min to no LOB during modified SLS as well as generally improved activity tolerance demonstrated throughout treatment. Continue with BITS training with addition of light 1lb ankle weight around RUE for light resistance with good tolerance.       [] No change.     [] Other:   [x] Patient would continue to benefit from skilled physical therapy services in order to:  improve gait and balance, reduce risk of falls, improve ability to continue to raise right arm overhead, as well as improve cognitive abilities through the use of BITS     STG: (to be met in 8 treatments)  ? Pain: Right sided body pain improve to 2/10 at max with movements lasting 30 minutes or greater  ? Strength: Right shoulder flexion and right elbow flexion strength improve to 4+/5  ? Function: Patient able to stand upright during walking  Patient to be independent with home exercise program as demonstrated by performance with correct form without cues.  Demonstrate Knowledge of fall prevention  LTG: (to be met in 16 treatments)  Right sided body pain improve to 1/10 or less at max after 60 minutes of physical activity   Right hip drop to resolve with gait  Control of right arm to be equal to left arm  Right hip flex/ext and right ankle DF/PF improve to 4+/5.  Stroke Impact Scale 16 improve to 10% functionally impaired or less.     Patient goals: \"To be back to

## 2024-03-01 ENCOUNTER — OFFICE VISIT (OUTPATIENT)
Dept: NEUROLOGY | Age: 54
End: 2024-03-01
Payer: COMMERCIAL

## 2024-03-01 VITALS
DIASTOLIC BLOOD PRESSURE: 83 MMHG | HEIGHT: 71 IN | BODY MASS INDEX: 23.52 KG/M2 | SYSTOLIC BLOOD PRESSURE: 150 MMHG | WEIGHT: 168 LBS | HEART RATE: 89 BPM

## 2024-03-01 DIAGNOSIS — I62.9 INTRACRANIAL HEMORRHAGE (HCC): Primary | ICD-10-CM

## 2024-03-01 PROCEDURE — G8427 DOCREV CUR MEDS BY ELIG CLIN: HCPCS | Performed by: STUDENT IN AN ORGANIZED HEALTH CARE EDUCATION/TRAINING PROGRAM

## 2024-03-01 PROCEDURE — 3017F COLORECTAL CA SCREEN DOC REV: CPT | Performed by: STUDENT IN AN ORGANIZED HEALTH CARE EDUCATION/TRAINING PROGRAM

## 2024-03-01 PROCEDURE — G8420 CALC BMI NORM PARAMETERS: HCPCS | Performed by: STUDENT IN AN ORGANIZED HEALTH CARE EDUCATION/TRAINING PROGRAM

## 2024-03-01 PROCEDURE — 99205 OFFICE O/P NEW HI 60 MIN: CPT | Performed by: STUDENT IN AN ORGANIZED HEALTH CARE EDUCATION/TRAINING PROGRAM

## 2024-03-01 PROCEDURE — 3079F DIAST BP 80-89 MM HG: CPT | Performed by: STUDENT IN AN ORGANIZED HEALTH CARE EDUCATION/TRAINING PROGRAM

## 2024-03-01 PROCEDURE — G8484 FLU IMMUNIZE NO ADMIN: HCPCS | Performed by: STUDENT IN AN ORGANIZED HEALTH CARE EDUCATION/TRAINING PROGRAM

## 2024-03-01 PROCEDURE — 3077F SYST BP >= 140 MM HG: CPT | Performed by: STUDENT IN AN ORGANIZED HEALTH CARE EDUCATION/TRAINING PROGRAM

## 2024-03-01 PROCEDURE — 4004F PT TOBACCO SCREEN RCVD TLK: CPT | Performed by: STUDENT IN AN ORGANIZED HEALTH CARE EDUCATION/TRAINING PROGRAM

## 2024-03-01 NOTE — PROGRESS NOTES
without atrophy or fasciculation      Motor function  Normal muscle bulk and tone; strength 4+/5 in RUE and RLE, 5/5 in LE and LLE      Sensory function Intact to light touch, pinprick, vibration, proprioception on all 4 extremities      Cerebellar Intact fine motor movement. No involuntary movements or tremors. No ataxia or dysmetria on finger to nose or heel to shin testing      Reflex function DTR 2+ on bilateral UE and LE, symmetric. Negative Babinski      Gait                  Antalgic gait          Medical Decision Making:     This is a 53-year-old patient with a history of hypertension, alcoholism, polysubstance abuse who presented to the hospital on January 15 for acute onset of aphasia and right-sided weakness.  Patient had a CT done which showed an acute left basal ganglia ganglia intraparenchymal hemorrhage secondary to uncontrolled HTN.  Patient reports he has been taking his medications as prescribed for blood pressure. Urine drug screening tested positive for cocaine and methadone.  Patient was counseled at length about hypertension medications and refraining from recreational drug use.      Plan:  Follow-up with endovascular for right vertebral severe stenosis and chronic left vertebral occlusion.    Continue aspirin 81 mg daily  Repeat CT head without contrast  Patient has a follow-up with neurosurgery and endovascular scheduled  Follow-up with neurology as needed    I spent a total of 50 minutes on the date of the service which included preparing to see the patient, face-to-face patient care, completing clinical documentation, obtaining and/or reviewing separately obtained history, performing a medically appropriate examination, counseling and educating the patient/family/caregiver and ordering medications, tests, or procedures.      Beatrice Hester MD   Neurology & Sleep Medicine  [facility]

## 2024-03-06 ENCOUNTER — HOSPITAL ENCOUNTER (OUTPATIENT)
Dept: PHYSICAL THERAPY | Age: 54
Setting detail: THERAPIES SERIES
Discharge: HOME | End: 2024-03-06
Payer: COMMERCIAL

## 2024-03-06 ENCOUNTER — HOSPITAL ENCOUNTER (OUTPATIENT)
Dept: SPEECH THERAPY | Age: 54
Setting detail: THERAPIES SERIES
Discharge: HOME | End: 2024-03-06
Payer: COMMERCIAL

## 2024-03-06 ENCOUNTER — HOSPITAL ENCOUNTER (OUTPATIENT)
Dept: OCCUPATIONAL THERAPY | Age: 54
Setting detail: THERAPIES SERIES
Discharge: HOME | End: 2024-03-06
Payer: COMMERCIAL

## 2024-03-06 DIAGNOSIS — R41.89 MODERATE COGNITIVE IMPAIRMENT: ICD-10-CM

## 2024-03-06 DIAGNOSIS — I69.320 APHASIA FOLLOWING CEREBROVASCULAR ACCIDENT (CVA): ICD-10-CM

## 2024-03-06 DIAGNOSIS — I69.391 DYSPHAGIA FOLLOWING CEREBROVASCULAR ACCIDENT (CVA): Primary | ICD-10-CM

## 2024-03-06 DIAGNOSIS — I69.322 DYSARTHRIA FOLLOWING CEREBROVASCULAR ACCIDENT: ICD-10-CM

## 2024-03-06 PROCEDURE — 92523 SPEECH SOUND LANG COMPREHEN: CPT

## 2024-03-06 PROCEDURE — 92610 EVALUATE SWALLOWING FUNCTION: CPT

## 2024-03-06 PROCEDURE — 97110 THERAPEUTIC EXERCISES: CPT

## 2024-03-06 PROCEDURE — 97116 GAIT TRAINING THERAPY: CPT

## 2024-03-06 NOTE — FLOWSHEET NOTE
[x] [facility]       Occupational Therapy       [address]       Phone: [phone]       Fax: [phone] [] [facility] Occupational  Therapy at [place]       [address]       Phone: [phone]       Fax: [phone] [] [facility]  Outpatient Rehabilitation &  Therapy  [address]  P: [phone]  F: [phone]           Occupational Therapy Cancel/No Show note    Date: 3/6/2024  Patient: Jagdeep WOOD Mariano Sr.  : 1970  MRN: [id number]  Cancels/No Shows to date:     For today's appointment patient:  [x]  Cancelled  []  Rescheduled appointment  []  No-show     Reason given by patient:  []  Patient ill  []  Conflicting appointment  []  No transportation    []  Conflict with work  []  No reason given  [x]  Other: pink eye   Comments: Patient called to cancel PT/OT/Speech appointments this date, has pink eye, next OT appointment scheduled for 3/7/24 at 2:00pm.     Electronically signed by: ERIN Frey/MAGDIEL

## 2024-03-06 NOTE — FLOWSHEET NOTE
as able, continue with dual tasking, add floor ladder      Frequency: 2 x/weeks for 16 visits       Time In: 100 pm         Time Out: 157 pm    Electronically signed by:  Noreen Bermudez PTA

## 2024-03-06 NOTE — FLOWSHEET NOTE
[x] [facility]  Outpatient Rehabilitation &  Therapy  [address]  [phone]  F: [phone] [] [facility]  Outpatient Rehabilitation &  Therapy  [address]   [address]  P: [phone]  F: [phone] [] [facility]  Outpatient Rehabilitation &  Therapy  [address]  P: [phone]  F: [phone] [] [facility]  Outpatient Rehabilitation &  Therapy  [address]   P: [phone]  F: [phone] [] [facility]   Outpatient Rehabilitation   & Therapy  [address]  P: [phone]   F: [phone]     Occupational Therapy Daily Treatment Note    Date:  3/6/2024  Patient Name:  Jagdeep Quintana Sr.    :  1970  MRN: [id number]  Referring Provider:  Maribell Dai MD  Insurance: Other, [place],16 VISITS AUTHORIZED, 2024 to 2024   Medical Diagnosis: I61.9 (ICD-10-CM) - Intraparenchymal hemorrhage of brain,             I62.9 (ICD-10-CM) - Intracranial hemorrhage   Rehab Codes: numbness R20.2,, lack of coordination R27.8,, fine motor skills loss R29.818,, muscle weakness generalized M62.81,, or muscle wasting of hand M62.54,   Onset Date: 24                 Next  Appt: Dr. Estrella 24, Dr. Weems 24  Visit# / total visits: 3/16; Progress note for patient due at visit 8    Cancels/No Shows: 0/0      Subjective:    Pain:  [] Yes  [x] No  Location: N/A  Pain Rating: (0-10 scale) 0/10  Pain altered Tx:  [x] No  [] Yes  Action: NA  Pt Comments: Denies pain upon arrival, states he did not sleep well last night and is very tired.    Precautions: none  Surgery procedure and date: NA    Objective:  Today's Treatment:  Modalities:  Exercises:  EXERCISE    REPS/     TIME  WEIGHT/    LEVEL COMMENTS   Foam Cube 15 reps green Not Completed, HEP   Clothespin Foam Cubes 1 series green Completed    Hand Exerciser 50 reps 25 pounds Completed    Grooved Pegboard 1 series  Not

## 2024-03-06 NOTE — CONSULTS
[x] [facility]  Outpatient Rehabilitation &  Therapy  [address]  [phone]  F: [phone] [] [facility]  Outpatient Rehabilitation &  Therapy  [address]  P: [phone]  F: [phone] [] [facility]  Outpatient Rehabilitation &  Therapy  [address]   P: [phone]  F: [phone] [] [facility] Outpatient Rehabilitation &  Therapy  [address]  P: [phone]  F: [phone]     Speech Therapy Evaluation       Date:  3/6/2024  Patient: Jagdeep Quintana Sr.  : 1970  MRN: [id number]  Physician: Maribell Dai MD     Insurance:     ANGELANO [email or web address] (LIMIT OF 20 EACH FOR PT/OT/ST VISITS PER JIM YR, 20 REMAIN, HARD MAX LIMIT, AUTH AFTER EVAL THROUGH [employer] #[phone] or [email or web address], [facility] MEDICAID COVERS PRIMARY COINSURANCE      Medical Diagnosis: I62.9 - Intracranial hemorrhage (HCC); I61.9 - Intraparenchymal hemorrhage of brain (HCC)     Rehab Codes: R41.89 - Moderate cognitive impairment; I69.320 Aphasia following cerebrovascular accident; i69.322 Dysarthria following cerebrovascular accident; i69.391 Dysphagia following cerebrovascular accident  Onset date: 24    Next Dr's appt.: Dr. Estrella , Dr. Weems      Subjective:   CC: Patient complains of slurred speech.   HPI: Patient referred to outpatient speech therapy for intracranial hemorrhage. (24) Patient's brother called EMS - couldn't move right side, could not talk or get up, and right side of face was droopy. Patient had blood on brain, and had a burst from BP being too high. Patient smokes and drinks. Did not have home therapy, went home on 2024.      PMHx: [] Unremarkable [] Diabetes [x] HTN  [] Pacemaker   [] MI/Heart Problems [] Cancer [] Arthritis [] Other:              [x] Refer to full medical chart  In EPIC     Comorbidities:   [] Obesity [] Dialysis  [] N/A   [] Asthma/COPD []

## 2024-03-07 ENCOUNTER — HOSPITAL ENCOUNTER (OUTPATIENT)
Dept: PHYSICAL THERAPY | Age: 54
Setting detail: THERAPIES SERIES
Discharge: HOME | End: 2024-03-07
Payer: COMMERCIAL

## 2024-03-07 ENCOUNTER — HOSPITAL ENCOUNTER (OUTPATIENT)
Dept: OCCUPATIONAL THERAPY | Age: 54
Setting detail: THERAPIES SERIES
Discharge: HOME | End: 2024-03-07
Payer: COMMERCIAL

## 2024-03-07 PROCEDURE — 97110 THERAPEUTIC EXERCISES: CPT

## 2024-03-07 PROCEDURE — 97116 GAIT TRAINING THERAPY: CPT

## 2024-03-07 NOTE — FLOWSHEET NOTE
[x] [facility]  Outpatient Rehabilitation &  Therapy  [address]  [phone]  F: [phone] [] [facility]  Outpatient Rehabilitation &  Therapy  [address]   [address]  P: [phone]  F: [phone] [] [facility]  Outpatient Rehabilitation &  Therapy  [address]  P: [phone]  F: [phone] [] [facility]  Outpatient Rehabilitation &  Therapy  [address]   P: [phone]  F: [phone] [] [facility]   Outpatient Rehabilitation   & Therapy  [address]  P: [phone]   F: [phone]     Occupational Therapy Daily Treatment Note    Date:  3/7/2024  Patient Name:  Jagdeep Quintana Sr.    :  1970  MRN: [id number]  Referring Provider:  Maribell Dai MD  Insurance: Other, [place],16 VISITS AUTHORIZED, 2024 to 2024   Medical Diagnosis: I61.9 (ICD-10-CM) - Intraparenchymal hemorrhage of brain,             I62.9 (ICD-10-CM) - Intracranial hemorrhage   Rehab Codes: numbness R20.2,, lack of coordination R27.8,, fine motor skills loss R29.818,, muscle weakness generalized M62.81,, or muscle wasting of hand M62.54,   Onset Date: 24                 Next  Appt: Dr. Estrella 24, Dr. Weems 24  Visit# / total visits: ; Progress note for patient due at visit 8    Cancels/No Shows: 0/0      Subjective:    Pain:  [] Yes  [x] No  Location: N/A  Pain Rating: (0-10 scale) 0/10  Pain altered Tx:  [x] No  [] Yes  Action: NA  Pt Comments: Reports he had a more restful night but feels tired from \"too much sleep.\" States \"I feel like I am on drugs\" despite resting, taking medication, and eating, unsure why he is feeling like this.     Precautions: none  Surgery procedure and date: NA    Objective:  Today's Treatment:  Modalities:  Exercises:  EXERCISE    REPS/     TIME  WEIGHT/    LEVEL COMMENTS   Foam Cube 15 reps green Not Completed, HEP   Clothespin Foam Cubes 1

## 2024-03-14 ENCOUNTER — HOSPITAL ENCOUNTER (OUTPATIENT)
Dept: PHYSICAL THERAPY | Age: 54
Setting detail: THERAPIES SERIES
Discharge: HOME | End: 2024-03-14
Payer: COMMERCIAL

## 2024-03-14 ENCOUNTER — HOSPITAL ENCOUNTER (OUTPATIENT)
Dept: SPEECH THERAPY | Age: 54
Setting detail: THERAPIES SERIES
Discharge: HOME | End: 2024-03-14
Payer: COMMERCIAL

## 2024-03-14 ENCOUNTER — HOSPITAL ENCOUNTER (OUTPATIENT)
Dept: OCCUPATIONAL THERAPY | Age: 54
Setting detail: THERAPIES SERIES
Discharge: HOME | End: 2024-03-14
Payer: COMMERCIAL

## 2024-03-14 PROCEDURE — 97110 THERAPEUTIC EXERCISES: CPT

## 2024-03-14 PROCEDURE — 97129 THER IVNTJ 1ST 15 MIN: CPT

## 2024-03-14 PROCEDURE — 92507 TX SP LANG VOICE COMM INDIV: CPT

## 2024-03-14 PROCEDURE — 92526 ORAL FUNCTION THERAPY: CPT

## 2024-03-14 NOTE — FLOWSHEET NOTE
promote safety and independence in daily environment, assess/monitor tolerance to recommended diet, provide direct skilled speech therapy, and continue to provide education.        Pt. Education:  [x] Yes  [] No  [] Reviewed Prior HEP/Ed  Method of Education: [x] Verbal  [x] Demo  [x] Written  Comprehension of Education:  [x] Verbalizes understanding.  [x] Demonstrates understanding.  [] Needs review.  [] Demonstrates/verbalizes HEP/Ed previously given.     Plan: [x] Continue current frequency toward long and short term goals.    [x] Specific Instructions for subsequent treatments: as above      Time In: 1309            Time Out: 1355    Electronically signed by:  Carmelita Dunbar M.A., CCC-SLP

## 2024-03-14 NOTE — FLOWSHEET NOTE
Translation 1 series  Completed    Digi-Flex 50 reps blue Completed    Flexbar   strength  Wrist Flexion/Ext  Wrist pronation  Wrist supination  Wrist ulnar deviation  Wrist radial dev  Shoulder Adb  Shoulder Add   15-20 reps green Reps increased, Completed    Power Web- composite fist 30 reps Blue  Resistance increased, Completed    O'Ede Pegboard 3 rows  Completed   Consuelo Pickup 1 series  Not Completed    Other:      Assessment: [x] Progressing toward goals. Does not enjoy fine motor activities. Significant muscle wasting observed in thenar eminence of R hand, no improvement. Strength and FMC limited. Forearm, wrist, hand, thumb and digits 2-5 ROM WFL, able to form full composite fist and oppose to all digits. Continued strengthening and FMC exercises, easily frustrated when dropping objects or taking increased time to complete activities. Mod fatigue at conclusion of OT session.     [] No change.  [] Other     [x] Patient would continue to benefit from skilled occupational therapy services in order to: Improve and Maximize  functional /grasp, Strength, and Coordination deficit in order to Ensure safe return to work, perform ADLs with increased safety, and improve functional use of UE in ADL performance     Short Term Goals: (  8    Treatments)  Increase R  strength to 60 pounds and all R pinches by 5 pounds for ease with daily activities   Increase R FMC as evidenced by a decrease of 15 seconds on 9 hole peg test   Increase function:UE Functional Index Score 58/80 or more points to promote increased functional abilities  Patient to be independent with home exercise program as demonstrated by performance with correct form without cues     Long Term Goals: (  16  Treatments)  Increase R  strength to 70 pounds and all R pinches by 3 pounds for ease with daily activities   Increase R FMC as evidenced by a decrease of 10 seconds on 9 hole peg test   Increase function:UE Functional Index Score 70/80

## 2024-03-14 NOTE — PROGRESS NOTES
Signature:________________________________Date:__________________  By signing above or cosigning this note, I have reviewed this plan of care and certify a need for medically necessary rehabilitation services.     *PLEASE SIGN ABOVE AND FAX BACK ALL PAGES*

## 2024-03-19 ENCOUNTER — HOSPITAL ENCOUNTER (OUTPATIENT)
Dept: OCCUPATIONAL THERAPY | Age: 54
Setting detail: THERAPIES SERIES
Discharge: HOME | End: 2024-03-19
Payer: COMMERCIAL

## 2024-03-19 ENCOUNTER — HOSPITAL ENCOUNTER (OUTPATIENT)
Dept: PHYSICAL THERAPY | Age: 54
Setting detail: THERAPIES SERIES
Discharge: HOME | End: 2024-03-19
Payer: COMMERCIAL

## 2024-03-19 ENCOUNTER — HOSPITAL ENCOUNTER (OUTPATIENT)
Dept: SPEECH THERAPY | Age: 54
Setting detail: THERAPIES SERIES
Discharge: HOME | End: 2024-03-19
Payer: COMMERCIAL

## 2024-03-19 PROCEDURE — 92507 TX SP LANG VOICE COMM INDIV: CPT

## 2024-03-19 PROCEDURE — 97110 THERAPEUTIC EXERCISES: CPT

## 2024-03-19 PROCEDURE — 97130 THER IVNTJ EA ADDL 15 MIN: CPT

## 2024-03-19 PROCEDURE — 97116 GAIT TRAINING THERAPY: CPT

## 2024-03-19 PROCEDURE — 97129 THER IVNTJ 1ST 15 MIN: CPT

## 2024-03-19 NOTE — PRE-CERTIFICATION NOTE
[x] [facility]  Outpatient Rehabilitation &  Therapy  [address]  [phone]  [phone] [] [facility]  Outpatient Rehabilitation &  Therapy  [address]  P: [phone]  F: [phone] [] [facility]  Outpatient Rehabilitation &  Therapy  [address]  P: [phone]  F: [phone] [] [facility]  Outpatient Rehabilitation &  Therapy  [address]  [phone]  [phone] [] [facility]  Outpatient Rehabilitation &  Therapy  [address]   P: [phone]  F: [phone]  [] [facility]  Outpatient Rehabilitation &  Therapy  [address]  P: [phone]  F: [phone] [] [facility]  Outpatient Rehabilitation &  Therapy  [address]  P: [phone]  F: [phone] [] [facility]  Outpatient Rehabilitation &  Therapy  [address]  P: [phone]  F: [phone] [] [facility]  Outpatient Rehabilitation &  Therapy  [address]  P: [phone]  F: [phone]  [] [facility] Outpatient Rehabilitation &  Therapy  [address]  P: [phone]  F: [phone]          Therapy Pre-certification Note      3/19/2024    Jagdeep Quintana Sr.  1970   [id number]      Insurance approval was received for Physical Therapy from Eloisa Vo on 3/19/24.  Approval was received for 8 additional visits, from 2/12/24 to 5/31/24.  Authorization number [id number].    Writer called Vee by Eloisa to confirm if additional visits were approved.  Confirmed: patient has been approved for 8 additional visits (for a total of 16).    Call reference number [id number].          Electronically signed by Nataliya Rangel PT on 3/19/2024 at 10:03 AM

## 2024-03-19 NOTE — FLOWSHEET NOTE
[] [facility]  Outpatient Rehabilitation &  Therapy  [address]  [phone]  F: [phone]     Speech Therapy Daily Treatment Note    Date:  3/19/2024  Patient Name:  Jagdeep Quintana Sr.    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 Weems     Visit# / total visits: ; Progress note for Medicare patient due at visit 8   Cancels/No Shows: 0/0    Subjective:    Pain:  [] Yes  [x] No Location:  N/A Pain Rating: (0-10 scale) 0/10  Pain altered Tx:  [x] No  [] Yes  Action:  Comments: Patient attended skilled speech therapy a little over 5 minutes late. Patient was pleasant and cooperative throughout therapy session. Patient stated that he has completing swallowing exercises twice since last session and that he has been implementing the chin tuck \"for the most part.\" Clinician instructed patient to and the importance of implementing chin tuck every time he consuming thin liquids and to complete swallowing exercises 2-3 times daily. Patient verbalized understanding.     Objective:  Modalities:   Precautions:    Short-term Goals  Timeframe for Short-term Goals: To be met in 8 visits     Goal 1: Patient will complete STM/delayed recall tasks at 80% accuracy independently  Education [memory strategies]: patient briefly educated on and provided handout of memory

## 2024-03-19 NOTE — FLOWSHEET NOTE
[x] [facility]  Outpatient Rehabilitation &  Therapy  [address]  [phone]  F: [phone] [] [facility]  Outpatient Rehabilitation &  Therapy  [address]   [address]  P: [phone]  F: [phone] [] [facility]  Outpatient Rehabilitation &  Therapy  [address]  P: [phone]  F: [phone] [] [facility]  Outpatient Rehabilitation &  Therapy  [address]   P: [phone]  F: [phone] [] [facility]   Outpatient Rehabilitation   & Therapy  [address]  P: [phone]   F: [phone]     Occupational Therapy Daily Treatment Note    Date:  3/19/2024  Patient Name:  Jagdeep Quintana Sr.    :  1970  MRN: [id number]  Referring Provider:  Maribell Dai MD  Insurance: Other, [place],16 VISITS AUTHORIZED, 2024 to 2024   Medical Diagnosis: I61.9 (ICD-10-CM) - Intraparenchymal hemorrhage of brain,             I62.9 (ICD-10-CM) - Intracranial hemorrhage   Rehab Codes: numbness R20.2,, lack of coordination R27.8,, fine motor skills loss R29.818,, muscle weakness generalized M62.81,, or muscle wasting of hand M62.54,   Onset Date: 24                 Next  Appt: Dr. Estrella 24, Dr. Weems 24  Visit# / total visits: ; Progress note for patient due at visit 8    Cancels/No Shows: 0/1      Subjective:    Pain:  [] Yes  [x] No  Location: N/A  Pain Rating: (0-10 scale) 0/10  Pain altered Tx:  [x] No  [] Yes  Action: NA  Pt Comments: Reports being tired, feels his strength is improving but still has difficulty with FMC.     Precautions: none  Surgery procedure and date: NA      Objective:  Today's Treatment:  Modalities: NA  Exercises:  EXERCISE    REPS/     TIME  WEIGHT/    LEVEL COMMENTS   Foam Cube 15 reps green Not Completed, HEP   Clothespin Foam Cubes 1 series green Completed    Hand Exerciser 30 reps 50 pounds Increased, Completed    Grooved Pegboard 1

## 2024-03-19 NOTE — FLOWSHEET NOTE
[x] [facility]  Outpatient Rehabilitation &  Therapy  [address]  [phone]  [phone]     Physical Therapy Daily Treatment Note    Date:  3/19/2024  Patient Name:  Jagdeep Quintana Sr.    :  1970  MRN: [id number]  Physician:  Maribell Dai MD (hospitalist)  PCP is Dr. Francisco Ro at [facility] - phone: [phone]  (sees Dr. Ro today, 24) Fax [phone]  Insurance: Julissa Amin, [facility] Medicaid; 20 visits hard max,     Approval was received for 8 visits, from 24 to 24.  Authorization number [id number]    Approval was received for 8 additional visits (16 total), from 24 to 24.  Authorization number [id number].   Medical Diagnosis:   I62.9 (ICD-10-CM) - Intracranial hemorrhage (HCC)   I61.9 (ICD-10-CM) - Intraparenchymal hemorrhage of brain (HCC)   Rehab Codes: R 53.1, M 62.81, R 29.3, Z 91.81, R 26.29  Onset date: 24                 Next 's appt.: 24; Dr. Estrella , Dr. Weems     Visit# / total visits:  (per POC update 3/19/24)     Cancels/No Shows: 0/0    Subjective:    Pain:  [] Yes  [x] No    Location: Right thigh and right side of body/arm          Pain Rating: (0-10 scale) 0/10  Pain altered Tx:  [] Yes  [x] No  Action:  Comments:  Patient arrives from OT (also completed ST this date already) stating feeling very tired this date. Denies pain.    Objective:  Modalities:   Precautions:   Exercises:  Exercise Reps/ Time Weight/ Level Comments    Upright bike 5m L3  x   Scifit 5m L3            Standing       Gastroc stretch 3x30\"   x   Hip ABD 20x Blue  x   Hip EXT 20x Blue  x   Heel raises 2x20  Alternating with double leg    SL heel raises - RLE 2x20  \"    Modified SL stance w/ DB twist 20x 4lb LLE on 12\" step + blue foam    Modified SL stance w/ DB flexion 20x 4lb LLE on 12\" step + blue foam           Corner stretch 3x30\"      DB Flexion 2x10 3lb  x   DB ABD 2x10 3lb  x   Wall taps  15x ea Lime Up, out, down =

## 2024-03-21 ENCOUNTER — HOSPITAL ENCOUNTER (OUTPATIENT)
Dept: SPEECH THERAPY | Age: 54
Setting detail: THERAPIES SERIES
Discharge: HOME | End: 2024-03-21
Payer: COMMERCIAL

## 2024-03-21 ENCOUNTER — HOSPITAL ENCOUNTER (OUTPATIENT)
Dept: OCCUPATIONAL THERAPY | Age: 54
Setting detail: THERAPIES SERIES
Discharge: HOME | End: 2024-03-21
Payer: COMMERCIAL

## 2024-03-21 ENCOUNTER — HOSPITAL ENCOUNTER (OUTPATIENT)
Dept: PHYSICAL THERAPY | Age: 54
Setting detail: THERAPIES SERIES
Discharge: HOME | End: 2024-03-21
Payer: COMMERCIAL

## 2024-03-21 PROCEDURE — 97110 THERAPEUTIC EXERCISES: CPT

## 2024-03-21 PROCEDURE — 92507 TX SP LANG VOICE COMM INDIV: CPT

## 2024-03-21 PROCEDURE — 97129 THER IVNTJ 1ST 15 MIN: CPT

## 2024-03-21 PROCEDURE — 92526 ORAL FUNCTION THERAPY: CPT

## 2024-03-21 NOTE — FLOWSHEET NOTE
Completed    [place] Translation 1 series  Completed    Digi-Flex 50 reps blue Completed    Flexbar   strength  Wrist Flexion/Ext  Wrist pronation  Wrist supination  Wrist ulnar deviation  Wrist radial dev  Shoulder Adb  Shoulder Add   15-20 reps green Completed    Power Web- composite fist 30 reps Blue  Completed    O'Ede Pegboard 4 rows Remove with tweezers  Not Completed   Consuelo Pickup 1 series  Not Completed    Other:      Assessment: [x] Progressing toward goals. Significant muscle wasting observed in thenar eminence of R hand, no improvement. Strength and FMC remain limited. Forearm, wrist, hand, thumb and digits 2-5 ROM WFL, able to form full composite fist and oppose to all digits. Continued strengthening and FMC exercises, all exercise more difficulty today, easily frustrated when dropping objects or taking increased time to complete activities. Mod fatigue at conclusion of OT session.     [] No change.  [] Other     [x] Patient would continue to benefit from skilled occupational therapy services in order to: Improve and Maximize  functional /grasp, Strength, and Coordination deficit in order to Ensure safe return to work, perform ADLs with increased safety, and improve functional use of UE in ADL performance     Short Term Goals: (  8    Treatments)  Increase R  strength to 60 pounds and all R pinches by 5 pounds for ease with daily activities   Increase R FMC as evidenced by a decrease of 15 seconds on 9 hole peg test   Increase function:UE Functional Index Score 58/80 or more points to promote increased functional abilities  Patient to be independent with home exercise program as demonstrated by performance with correct form without cues     Long Term Goals: (  16  Treatments)  Increase R  strength to 70 pounds and all R pinches by 3 pounds for ease with daily activities   Increase R FMC as evidenced by a decrease of 10 seconds on 9 hole peg test   Increase function:UE Functional Index

## 2024-03-21 NOTE — CARE COORDINATION
[x] [facility]  Outpatient Rehabilitation &  Therapy  [address]  [phone]  [phone] [] [facility]  Outpatient Rehabilitation &  Therapy  [address]  P: [phone]  F: [phone] [] [facility]  Outpatient Rehabilitation &  Therapy  [address]  P: [phone]  F: [phone] [] [facility]  Outpatient Rehabilitation &  Therapy  [address]  [phone]  [phone] [] [facility]  Outpatient Rehabilitation &  Therapy  [address]   P: [phone]  F: [phone]  [] [facility]  Outpatient Rehabilitation &  Therapy  [address]  P: [phone]  F: [phone] [] [facility]  Outpatient Rehabilitation &  Therapy  [address]  [address]  P: [phone]  F: [phone] [] [facility]  Outpatient Rehabilitation &  Therapy  [address]  P: [phone]  F: [phone] [] [facility]  Outpatient Rehabilitation &  Therapy  [address]  P: [phone]  F: [phone]  [] [facility] Outpatient Rehabilitation &  Therapy  [address]  P: [phone]  F: [phone]     THERAPY RESPONSIBILITY OF CARE TRANSFER FORM       PATIENT NAME: Jagdeep Quintana   MRN: [id number]   : 1970      TRANSFERRING FACILITY:    [] [place]   [] [facility]   [] [facility]   [] [facility]   [] [facility] [] [place]   [x] [facility]  [] [facility]  [] [facility]   [] [place]  [] [facility]   [] Other:          ACCEPTING FACILITY  [] [place]   [] [facility]   [] [facility]   [] [facility]   [] [facility] [] [place]   [x] [facility]  [] [facility]  [] [facility]   [] [place]  [] [facility]   [] Other:         REASON FOR TRANSFER: Treating therapist leaving.

## 2024-03-21 NOTE — FLOWSHEET NOTE
[x] [facility]  Outpatient Rehabilitation &  Therapy  [address]  [phone]  [phone]     Therapy Cancel/No Show note    Date: 3/21/2024  Patient: Jagdeep Quintana .  : 1970  MRN: [id number]    Cancels/No Shows to date:     For today's appointment patient:    [x]  Cancelled    [] Rescheduled appointment    [] No-show     Reason given by patient:    [] Patient ill    [] Conflicting appointment    [] No transportation      [] Conflict with work    [] No reason given    [] Weather related    [] COVID-19    [x] Other:      Comments:  Patient attended ST and OT appts. During OT, patient reported he had been awake since 2:30 AM this morning and is very tired, does not want to stay for PT.       [x] Next appointment was confirmed    Electronically signed by: Irma Hastings PT

## 2024-03-21 NOTE — FLOWSHEET NOTE
[] [facility]  Outpatient Rehabilitation &  Therapy  [address]  [phone]  F: [phone]     Speech Therapy Daily Treatment Note    Date:  3/21/2024  Patient Name:  Jagdeep Quintana Sr.    :  1970  MRN: [id number]  Physician: Maribell Dai MD       Insurance: [employer] [facility] (LIMIT OF 20 EACH FOR PT/OT/ST VISITS PER JIM YR, 20 REMAIN, HARD MAX LIMIT, AUTH AFTER EVAL THROUGH [employer] #[phone] or [employer], [facility] MEDICAID COVERS PRIMARY COINSURANCE    Medical Diagnosis: I62.9 - Intracranial hemorrhage (HCC); I61.9 - Intraparenchymal hemorrhage of brain (HCC)       Rehab Codes: R41.89 - Moderate cognitive impairment; I69.320 Aphasia following cerebrovascular accident; i69.322 Dysarthria following cerebrovascular accident; i69.391 Dysphagia following cerebrovascular accident    Onset Date: 24     Next  Appt: grant Estrella , Dr. Weems     Visit# / total visits: 3/16; Progress note for Medicare patient due at visit 8   Cancels/No Shows: 0/0    Subjective:    Pain:  [] Yes  [x] No Location:  N/A Pain Rating: (0-10 scale) 0/10  Pain altered Tx:  [x] No  [] Yes  Action:  Comments: Patient attended skilled speech therapy a little over 5 minutes late. Patient was pleasant and cooperative throughout therapy session. Patient stated that he has completed swallowing exercises once since last session and that he has been \"sometimes.\" implementing the chin tuck. Patient completed HEP and reviewed same with clinician. Clinician instructed patient to and the importance of implementing chin tuck every time he consuming thin liquids and to complete swallowing exercises 2-3 times daily. More information on this is included within the objective notes below. Patient verbalized understanding.     Objective:  Modalities:   Precautions:    Short-term Goals  Timeframe for Short-term Goals: To be met in 8 visits     Goal 1: Patient will complete STM/delayed recall tasks at

## 2024-03-26 ENCOUNTER — HOSPITAL ENCOUNTER (OUTPATIENT)
Dept: CT IMAGING | Facility: CLINIC | Age: 54
Discharge: HOME | End: 2024-03-28
Payer: COMMERCIAL

## 2024-03-26 DIAGNOSIS — I62.9 INTRACRANIAL HEMORRHAGE (HCC): ICD-10-CM

## 2024-03-26 PROCEDURE — 70450 CT HEAD/BRAIN W/O DYE: CPT

## 2024-03-27 ENCOUNTER — HOSPITAL ENCOUNTER (OUTPATIENT)
Dept: OCCUPATIONAL THERAPY | Age: 54
Setting detail: THERAPIES SERIES
Discharge: HOME | End: 2024-03-27
Payer: COMMERCIAL

## 2024-03-27 ENCOUNTER — HOSPITAL ENCOUNTER (OUTPATIENT)
Dept: PHYSICAL THERAPY | Age: 54
Setting detail: THERAPIES SERIES
Discharge: HOME | End: 2024-03-27
Payer: COMMERCIAL

## 2024-03-27 NOTE — FLOWSHEET NOTE
[x] [facility]       Occupational Therapy       [address]       Phone: [phone]       Fax: [phone] [] [facility] Occupational  Therapy at [place]       [address]       Phone: [phone]       Fax: [phone] [] [facility]  Outpatient Rehabilitation &  Therapy  [address]   [address]  P: [phone]  F: [phone]           Occupational Therapy Cancel/No Show note    Date: 3/27/2024  Patient: Jagdeep WOOD Mariano Sr.  : 1970  MRN: [id number]  Cancels/No Shows to date:     For today's appointment patient:  [x]  Cancelled  []  Rescheduled appointment  []  No-show     Reason given by patient:  [x]  Patient ill  []  Conflicting appointment  []  No transportation    []  Conflict with work  []  No reason given  []  Other:     Comments:      Electronically signed by: ERIN Abel/L

## 2024-03-27 NOTE — FLOWSHEET NOTE
[x] [facility]  Outpatient Rehabilitation &  Therapy  [address]  [phone]  [phone]     Therapy Cancel/No Show note    Date: 3/27/2024  Patient: Jagdeep Quintana .  : 1970  MRN: [id number]    Cancels/No Shows to date:     For today's appointment patient:    [x]  Cancelled    [] Rescheduled appointment    [] No-show     Reason given by patient:    [] Patient ill    [] Conflicting appointment    [] No transportation      [] Conflict with work    [] No reason given    [] Weather related    [] COVID-19    [x] Other:      Comments:  3/27/24 - Cx patient's wife called to cancel, he is ill. confirmed for tomorrow appt will call back if he needs to cancel tomorrow also        [x] Next appointment was confirmed    Electronically signed by: Kenny Taylor PT

## 2024-03-28 ENCOUNTER — HOSPITAL ENCOUNTER (OUTPATIENT)
Dept: PHYSICAL THERAPY | Age: 54
Setting detail: THERAPIES SERIES
Discharge: HOME | End: 2024-03-28
Payer: COMMERCIAL

## 2024-03-28 ENCOUNTER — HOSPITAL ENCOUNTER (OUTPATIENT)
Dept: SPEECH THERAPY | Age: 54
Setting detail: THERAPIES SERIES
Discharge: HOME | End: 2024-03-28
Payer: COMMERCIAL

## 2024-03-28 ENCOUNTER — HOSPITAL ENCOUNTER (OUTPATIENT)
Dept: OCCUPATIONAL THERAPY | Age: 54
Setting detail: THERAPIES SERIES
Discharge: HOME | End: 2024-03-28
Payer: COMMERCIAL

## 2024-03-28 NOTE — FLOWSHEET NOTE
[x] [facility]       Occupational Therapy       [address]st [address]       Phone: [phone]       Fax: [phone] [] [facility] Occupational  Therapy at [place]       [address]       Phone: [phone]       Fax: [phone] [] [facility]  Outpatient Rehabilitation &  Therapy  [address]   [address]  P: [phone]  F: [phone]           Occupational Therapy Cancel/No Show note    Date: 3/28/2024  Patient: Jagdeep WOOD Mariano Sr.  : 1970  MRN: [id number]  Cancels/No Shows to date:     For today's appointment patient:  []  Cancelled  []  Rescheduled appointment  [x]  No-show     Reason given by patient:  []  Patient ill  []  Conflicting appointment  []  No transportation    []  Conflict with work  []  No reason given  []  Other:     Comments:      Electronically signed by: ERIN Frey/MAGDIEL

## 2024-03-28 NOTE — FLOWSHEET NOTE
[x] [facility]  Outpatient Rehabilitation &  Therapy  [address] [place] [address]  [phone]  [phone]     Therapy Cancel/No Show note    Date: 3/28/2024  Patient: Jagdeep Quintana   : 1970  MRN: [id number]    Cancels/No Shows to date:     For today's appointment patient:    [x]  Cancelled    [] Rescheduled appointment    [] No-show     Reason given by patient:    [] Patient ill    [] Conflicting appointment    [] No transportation      [] Conflict with work    [] No reason given    [] Weather related    [] COVID-19    [] Other:      Comments:          [x] Next appointment was confirmed PREVIOUSLY    Electronically signed by: Mackenzie Hall PTA

## 2024-04-08 ENCOUNTER — OFFICE VISIT (OUTPATIENT)
Dept: PHYSICAL MEDICINE AND REHAB | Age: 54
End: 2024-04-08
Payer: COMMERCIAL

## 2024-04-08 VITALS
DIASTOLIC BLOOD PRESSURE: 82 MMHG | HEIGHT: 71 IN | WEIGHT: 166 LBS | HEART RATE: 80 BPM | TEMPERATURE: 97.8 F | BODY MASS INDEX: 23.24 KG/M2 | SYSTOLIC BLOOD PRESSURE: 128 MMHG

## 2024-04-08 DIAGNOSIS — I69.322 DYSARTHRIA DUE TO RECENT STROKE: ICD-10-CM

## 2024-04-08 DIAGNOSIS — I69.351 HEMIPLEGIA AND HEMIPARESIS FOLLOWING CEREBRAL INFARCTION AFFECTING RIGHT DOMINANT SIDE (HCC): Primary | ICD-10-CM

## 2024-04-08 PROCEDURE — 3079F DIAST BP 80-89 MM HG: CPT | Performed by: PHYSICAL MEDICINE & REHABILITATION

## 2024-04-08 PROCEDURE — 4004F PT TOBACCO SCREEN RCVD TLK: CPT | Performed by: PHYSICAL MEDICINE & REHABILITATION

## 2024-04-08 PROCEDURE — 99212 OFFICE O/P EST SF 10 MIN: CPT | Performed by: PHYSICAL MEDICINE & REHABILITATION

## 2024-04-08 PROCEDURE — 3017F COLORECTAL CA SCREEN DOC REV: CPT | Performed by: PHYSICAL MEDICINE & REHABILITATION

## 2024-04-08 PROCEDURE — G8427 DOCREV CUR MEDS BY ELIG CLIN: HCPCS | Performed by: PHYSICAL MEDICINE & REHABILITATION

## 2024-04-08 PROCEDURE — G8420 CALC BMI NORM PARAMETERS: HCPCS | Performed by: PHYSICAL MEDICINE & REHABILITATION

## 2024-04-08 PROCEDURE — 3074F SYST BP LT 130 MM HG: CPT | Performed by: PHYSICAL MEDICINE & REHABILITATION

## 2024-04-08 NOTE — PROGRESS NOTES
[facility] PHYSICAL MEDICINE AND REHABILITATION  [address] [place] [address]  [place] [address]  Dept: [phone]  Dept Fax: [phone]    Outpatient Followup Note    Jagdeep Quintana , 53 y.o., male, presents for follow up c/o of Neurologic Problem  .     HPI:     HPI  Patient who was admitted to [facility] 1/14/24 with sudden onset R sided weakness, R facial droop, dysarthria, and aphasia. He had hypertensive urgency. Diagnostic studies confirmed L basal ganglia intraparenchymal hemorrhage. He had acute inpatient rehabilitation admission to [facility] and is being seen here in follow up today.     Stroke    Affected Side: right dominant  Handedness: right handed  Therapy Status: PT/OT/SLP at [facility] outpatient  Dysphagia: Absent   Aphasia: Broca's - intermittent but improving  Sleep:WNL  Daytime Focus/Attention:Intact - improving  Bowel: Spontaneous  : Spontaneous  Mood: WNL  Support: spouse / significant other and children / family / friends to help, mother, sister, and brother  Spasticity: Intermittent  Edema: None  DME:  None  Shoulder Pain: None ,     Past Medical History:   Diagnosis Date    Hypertension       Past Surgical History:   Procedure Laterality Date    FINGER SURGERY       Family History   Problem Relation Age of Onset    Cancer Mother         breast    Other Mother         aortic aneursyms    Stroke Father     Other Maternal Uncle         brain aneurysms     Social History     Socioeconomic History    Marital status:    Occupational History    Occupation:      Comment: laid off   Tobacco Use    Smoking status: Every Day     Current packs/day: 1.00     Average packs/day: 1 pack/day for 30.0 years (30.0 ttl pk-yrs)     Types: Cigarettes, Cigars    Smokeless tobacco: Never   Substance and Sexual Activity    Alcohol use: Yes     Alcohol/week: 12.0 standard drinks of alcohol     Types: 12 Cans of beer per week

## 2024-04-10 ENCOUNTER — HOSPITAL ENCOUNTER (OUTPATIENT)
Dept: SPEECH THERAPY | Age: 54
Setting detail: THERAPIES SERIES
Discharge: HOME | End: 2024-04-10
Payer: COMMERCIAL

## 2024-04-10 PROCEDURE — 92507 TX SP LANG VOICE COMM INDIV: CPT

## 2024-04-10 PROCEDURE — 97129 THER IVNTJ 1ST 15 MIN: CPT

## 2024-04-10 NOTE — FLOWSHEET NOTE
[x] [facility]  Outpatient Rehabilitation &  Therapy  [address]  [phone]  F: [phone]     Speech Therapy Daily Treatment Note    Date:  4/10/2024  Patient Name:  Jagdeep Quintana Sr.    :  1970  MRN: [id number]  Physician: Maribell Dai MD       [facility] (LIMIT OF 20 EACH FOR PT/OT/ST VISITS PER JIM YR, 20 REMAIN, HARD MAX LIMIT, AUTH AFTER EVAL THROUGH [employer] #[id number] or [employer].Directly, On license of [facility] MEDICAID COVERS PRIMARY COINSURANCE    Medical Diagnosis: I62.9 - Intracranial hemorrhage (HCC); I61.9 - Intraparenchymal hemorrhage of brain (HCC)       Rehab Codes: R41.89 - Moderate cognitive impairment; I69.320 Aphasia following cerebrovascular accident; i69.322 Dysarthria following cerebrovascular accident; i69.391 Dysphagia following cerebrovascular accident    Onset Date: 24     Next  Appt: VIRY Estrella , Dr. Weems     Visit# / total visits: ; Progress note due at visit 8   Cancels/No Shows: 0/2    Subjective:  Pt is in good spirits.  He arrived 15 minutes late. He states that he has not experienced any difficulties with swallowing.  He reports no coughing/choking or oral phase problems with solids or thins and states that he has not been using the chin tuck.  ST encouraged pt to continue using swallow strategies but ST will re-assess at next visit to determine if swallowing goals have been met.  Pt also forgot to bring his folder but will try to remember for his next visit.  Pain:  [] Yes  [x] No Location:  N/A Pain Rating: (0-10 scale) 0/10  Pain altered Tx:  [x] No  [] Yes  Action:  Comments:     Objective:  Short-term Goals  Timeframe for Short-term Goals: To be met in 8 visits     Goal 1: Patient will complete STM/delayed recall tasks at 80% accuracy independently  Pt was shown a picture for pt to memorize.  ST encouraged pt to use visualization and attention to detail as strategies.  Pt was given a 20-minute delay

## 2024-04-11 ENCOUNTER — HOSPITAL ENCOUNTER (OUTPATIENT)
Dept: PHYSICAL THERAPY | Age: 54
Setting detail: THERAPIES SERIES
Discharge: HOME | End: 2024-04-11
Payer: COMMERCIAL

## 2024-04-11 PROCEDURE — 97110 THERAPEUTIC EXERCISES: CPT

## 2024-04-11 NOTE — FLOWSHEET NOTE
[x] [facility]  Outpatient Rehabilitation &  Therapy  [address]  [phone]  [phone]     Physical Therapy Daily Treatment Note    Date:  2024  Patient Name:  Jagdeep Quintana Sr.    :  1970  MRN: [id number]  Physician:  Maribell Dai MD (hospitalist)  PCP is Dr. Francisco Ro at [facility] - phone: [phone]  (sees Dr. Ro today, 24) Fax [phone]  Insurance: Julissa Amin, [facility] Medicaid; 20 visits hard max,    Approval was received for 8 visits, from 24 to 24.  Authorization number [id number]    Approval was received for 8 additional visits (16 total), from 24 to 24.  Authorization number [id number].   Medical Diagnosis:   I62.9 (ICD-10-CM) - Intracranial hemorrhage (HCC)   I61.9 (ICD-10-CM) - Intraparenchymal hemorrhage of brain (HCC)   Rehab Codes: R 53.1, M 62.81, R 29.3, Z 91.81, R 26.29  Onset date: 24                 Next 's appt.: 24; Dr. Estrella , Dr. Weems     Visit# / total visits: 10/16 (per POC update 3/19/24)     Cancels/No Shows: 2/2    Subjective:    Pain:  [] Yes  [x] No    Location: Right thigh and right side of body/arm          Pain Rating: (0-10 scale) 0/10  Pain altered Tx:  [] Yes  [x] No  Action:  Comments:  Patient reports things are smooth today.  Working out transportation issues with insurance company.      Objective:  Modalities:   Precautions:   Exercises:  Exercise Reps/ Time Weight/ Level Comments    Upright bike 5m L3     Scifit 5m L3  x          Standing       Gastroc stretch 3x30\"   x   Hip ABD 20x Blue  x   Hip EXT 20x Blue  x   Heel raises 2x20  Alternating with double leg x   SL heel raises - RLE 2x20  \" x   Resistive walk outs 2x 3 pl Added 4.11 x   Modified SL stance w/ DB twist 20x 4lb LLE on 12\" step + blue foam    Modified SL stance w/ DB flexion 20x 4lb LLE on 12\" step + blue foam           Matrix knee flexion 10x2 50# Added 4.11 x   Matrix knee extension 10x2 50#

## 2024-04-12 NOTE — TELEPHONE ENCOUNTER
HEARING AID CHECK    02/21/24 AUDIOLOGY  Date of Purchase:    03/21/2024  Make :  [employer]ron  Model:  Relate 4.0 Gold BRIGIDO R  Serial # RT:  [id number]  Serial # LT:  [id number]   RT:  Phonak M power size 1   LT:  Phonak M power size 1  Dome/Earmold RT: Open- Large  Dome/Earmold LT: Open- Large  Retention line:  Yes  Battery Size:  Internal Lithium Ion  Supplies:  [employer]  Accessories:   SN: [id number]  :  Mango[employer]  Cell Phone:  None  Jovanna installed:  No  Paired to Cell Phone: No  [employer] HR Service Plan: 1 YEAR  Manuf. Warranty: 03/16/2027  L&D Warranty: 03/16/2027  Payor:   [facility]  Audiologist:  Charles Bullard    SUBJECTIVE:  Reason for visit: 2nd hearing aid check in trial.  Patient reports that the right hearing aid is not working again.      OBJECTIVE:  Services provided:   Otoscopy:  minimal cerumen bilaterally.  Other: Completed hard reset and aid began working again.  This has now happened for the 3rd time.    ASSESSMENT:  Will send hearing aid to get replaced by .    FOLLOW-UP:  To be determined.   Referral for consultation received. Chart to Dr. Covarrubias So to review for consideration for this consultation.  Per Dr. Jackie Thornton    Patient agrees to appointment 12/16/2020

## 2024-04-17 ENCOUNTER — HOSPITAL ENCOUNTER (OUTPATIENT)
Dept: OCCUPATIONAL THERAPY | Age: 54
Setting detail: THERAPIES SERIES
Discharge: HOME | End: 2024-04-17
Payer: COMMERCIAL

## 2024-04-17 ENCOUNTER — HOSPITAL ENCOUNTER (OUTPATIENT)
Dept: SPEECH THERAPY | Age: 54
Setting detail: THERAPIES SERIES
Discharge: HOME | End: 2024-04-17
Payer: COMMERCIAL

## 2024-04-17 ENCOUNTER — HOSPITAL ENCOUNTER (OUTPATIENT)
Dept: PHYSICAL THERAPY | Age: 54
Setting detail: THERAPIES SERIES
Discharge: HOME | End: 2024-04-17
Payer: COMMERCIAL

## 2024-04-17 PROCEDURE — 92507 TX SP LANG VOICE COMM INDIV: CPT

## 2024-04-17 PROCEDURE — 97110 THERAPEUTIC EXERCISES: CPT

## 2024-04-17 PROCEDURE — 92526 ORAL FUNCTION THERAPY: CPT

## 2024-04-17 NOTE — FLOWSHEET NOTE
will complete tasks targeting problem solving/executive function at 80% accuracy independently  independently  Pt was given hypothetical situations and asked to evaluate the solutions and determine if they were or were not appropriate.  Pt was 15/15 correct.  He was also asked to identify a more appropriate solution which he was 10/12 correct improving to 12/12 correct with min cues.     Goal 5: Patient will complete tasks targeting abstract reasoning/thought organization at 80% accuracy independently  independently  Did not address this date.      Goal 6: Patient will complete tasks targeting attention at 80% accuracy independently  Did not address this date.     Goal 5: Patient will recall and implement speech strategies at 90% accuracy independently   Education [speech strategies]: patient briefly reeducated on speech strategies. Patient verbalized understanding.     Goal 7: Patient will produce 7 functional phrases with 80% intelligibility with minimal verbal support to implement speech strategies   Conversational speech: patient's speech was approximately 70% intelligible throughout conversational speech. Patient required and benefited from reminders to implement speech strategies.      Goal 8: Patient will tolerate recommended diet with minimal to no overt clinical signs of aspiration  Not addressed this date.      Goal 9: Patient will implement safe swallow strategies/precautions with minimal verbal cues at 80% accuracy   Not addressed this date       Goal 10: Patient will complete swallowing oral motor, pharyngeal and laryngeal exercises at 80% accuracy independently  ST addressed OME for improved speech intelligibility.  Pt completed 10 reps of selected lingual exercises with mod verbal/visual cues.      Long-term Goals  Timeframe for Long-term Goals: To be met in 12 visits  Goal 1: Patient will improve cognitive function to WFL-mild in order to promote promote indepenednce and safety in daily

## 2024-04-17 NOTE — FLOWSHEET NOTE
[x] [facility]  Outpatient Rehabilitation &  Therapy  [address]  [phone]  F: [phone] [] [facility]  Outpatient Rehabilitation &  Therapy  [address]   [address]  P: [phone]  F: [phone] [] [facility]  Outpatient Rehabilitation &  Therapy  [address]  P: [phone]  F: [phone] [] [facility]  Outpatient Rehabilitation &  Therapy  [address]   P: [phone]  F: [phone] [] [facility]   Outpatient Rehabilitation   & Therapy  [address]  P: [phone]   F: [phone]     Occupational Therapy Daily Treatment Note    Date:  2024  Patient Name:  Jagdeep Quintana Sr.    :  1970  MRN: [id number]  Referring Provider:  Maribell Dai MD  Insurance: Other, [place],16 VISITS AUTHORIZED, 2024 to 2024   Medical Diagnosis: I61.9 (ICD-10-CM) - Intraparenchymal hemorrhage of brain,             I62.9 (ICD-10-CM) - Intracranial hemorrhage   Rehab Codes: numbness R20.2,, lack of coordination R27.8,, fine motor skills loss R29.818,, muscle weakness generalized M62.81,, or muscle wasting of hand M62.54,   Onset Date: 24                 Next  Appt: Dr. Esrtella 24, Dr. Weems 24  Visit# / total visits: ; Progress note for patient due at visit 8    Cancels/No Shows: 1/2      Subjective:    Pain:  [] Yes  [x] No  Location: N/A  Pain Rating: (0-10 scale) 0/10  Pain altered Tx:  [x] No  [] Yes  Action: NA  Pt Comments: No complaints, denies pain. Reports he has a driving eval tomorrow.      Precautions: none  Surgery procedure and date: NA      Objective:  Today's Treatment:  Modalities: NA  Exercises:  EXERCISE    REPS/     TIME  WEIGHT/    LEVEL COMMENTS   Foam Cube 15 reps green Not Completed, HEP   Clothespin Foam Cubes 1 series green Completed    Hand Exerciser 30 reps 50 pounds Completed    Grooved Pegboard 1 series  Not Completed

## 2024-04-17 NOTE — FLOWSHEET NOTE
4.11 X                        Thera bands   Added 4.11    Ext 10x2 blue  X   Triceps  10x2 Blue  X          Corner stretch 3x30\"      DB Flexion 2x10 3lb  X   DB ABD 2x10 3lb  X   DB bicep curls 3x10 3lb Added 4.11 X   Wall taps  15x ea Lime Up, out, down = 1    Wall push ups 3x10             BITS  Cognitive  - memory  Visual Scanning  Handwriting             GAIT  4lb RLE     Hurdles 7min total 4 hurdles 1) FWD - RLE lead  2) FWD - LLE lead  3) lateral - both LE lead  4) FWD/RETRO    Floor ladder 10 min total  2 feet in each  Lateral 2 feet in ea  High marches    Multiple sets for all including for timed sets.  I.e: timing patient then challenging him to beat his time on the next lap           Supine   Held all    Bridges       Prone       Prone hip ext       Prone knee flexion       Sidelying       Hip ABD        Clamshells       Other:       Treatment Charges: Mins Units   []  Modalities     [x]  Ther Exercise 38 3   []  Manual Therapy     []  Ther Activities     []  Neuro Re-ed     []  Vasocompression     [] Gait     []  Other     Total Billable time 38 3       Assessment: [x] Progressing toward goals. Pt completed charted therex much quicker this date - stating that he normally has to take more breaks in between ex's. Instability note with SL stance exercise with - recommend incorporating more dynamic balance activities to continue improving LE coordination, balance and overall LE strength.       [] No change.      [] Other:    [x] Patient would continue to benefit from skilled physical therapy services in order to:  improve gait and balance, reduce risk of falls, improve ability to continue to raise right arm overhead, as well as improve cognitive abilities through the use of BITS     STG: (to be met in 8 treatments) Assessed 3/14/2024  ? Pain: Right sided body pain improve to 2/10 at max with movements lasting 30 minutes or greater MET: Patient reports he no longer has pain in the right side of the body.   ?  Romulo

## 2024-04-18 ENCOUNTER — HOSPITAL ENCOUNTER (OUTPATIENT)
Dept: OCCUPATIONAL THERAPY | Age: 54
Setting detail: THERAPIES SERIES
Discharge: HOME | End: 2024-04-18
Payer: COMMERCIAL

## 2024-04-18 PROCEDURE — 97167 OT EVAL HIGH COMPLEX 60 MIN: CPT

## 2024-04-18 PROCEDURE — 97535 SELF CARE MNGMENT TRAINING: CPT

## 2024-04-18 NOTE — FLOWSHEET NOTE
signal; checking for traffic; & operating gear shift): Independent    Brake Reaction Times (release of gas to apply the brake of 0.4/100 sec. < 65; 0.5/100 sec. > 65; stopping distance of 175 feet): patient with average brake response speed of 0.7 sec & mildly delayed stopping distance of 204'.    Basic Vehicle Control (two-rosalia, 40 mph, 4-way stops, minimal cross traffic, pedestrians): Patient performance on simulated assessments of driving indicate consistent ability to adhere to stops and traffic lights with patient able to safely negotiate 5/5 intersections; patient over posted speed 19% of drive time; patient crossed center 3x, off road 4x & out of rosalia 7% of drive time with an average speed of 64 mph.    Assessment: Results of clinical visual-perceptual assessment indicate that patient would benefit from corrective lenses for distance vision although his current vision is marginally within the legal limit for driving with mild searching noted with visual saccades. Patient scores on cognitive screen indicate mild impairment in the areas of sustained and selective attention and immediate and delayed recall of information. Performance on physical function testing revealed mild R UE weakness with mildly reduced speed/coordination B LEs. Patient scores on simulated assessments of driving revealed mildly delayed stopping distance, rosalia positioning and speed control. Patient has an   license and will be required to pass the computer knowledge test to secure an [place] Temporary Permit, at which time we can schedule the on-road driving examination behind the wheel in the [facility] vehicle.      Recommendation(s): Recommend patient schedule an appointment with an ophthalmologist for a vision exam with the goal of securing corrective lenses for distance visual acuity; complete the computer practice knowledge test of rules of driving prior to applying for his [place] temporary  permit; participate in

## 2024-04-19 ENCOUNTER — HOSPITAL ENCOUNTER (OUTPATIENT)
Dept: OCCUPATIONAL THERAPY | Age: 54
Setting detail: THERAPIES SERIES
Discharge: HOME | End: 2024-04-19
Payer: COMMERCIAL

## 2024-04-19 ENCOUNTER — HOSPITAL ENCOUNTER (OUTPATIENT)
Dept: PHYSICAL THERAPY | Age: 54
Setting detail: THERAPIES SERIES
Discharge: HOME | End: 2024-04-19
Payer: COMMERCIAL

## 2024-04-19 PROCEDURE — 97110 THERAPEUTIC EXERCISES: CPT

## 2024-04-19 NOTE — FLOWSHEET NOTE
[x] [facility]  Outpatient Rehabilitation &  Therapy  [address]  [phone]  [phone]     Physical Therapy Daily Treatment Note    Date:  2024  Patient Name:  Jagdeep Quintana Sr.      :  1970    MRN: [id number]  Physician:  Maribell Dai MD (hospitalist)  [facility] is Dr. Francisco [facility] at [facility] - phone: [phone]  (sees Dr. Ro today, 24) Fax [phone]  Insurance: Julissa Amin [facility] Medicaid; 20 visits hard max,    Approval was received for 8 visits, from 24 to 24.  Authorization number [id number]    Approval was received for 8 additional visits (16 total), from 24 to 24.  Authorization number [id number].   Medical Diagnosis:   I62.9 (ICD-10-CM) - Intracranial hemorrhage (HCC)   I61.9 (ICD-10-CM) - Intraparenchymal hemorrhage of brain (HCC)   Rehab Codes: R 53.1, M 62.81, R 29.3, Z 91.81, R 26.29  Onset date: 24                 Next 's appt.:  Dr. Estrella , Dr. Weems     Visit# / total visits:     Cancels/No Shows:     Subjective:    Pain:  [] Yes  [x] No    Location: Right thigh and right side of body/arm          Pain Rating: (0-10 scale) 0/10  Pain altered Tx:  [] Yes  [x] No  Action:  Comments: patient reports that he has no pain upon arrival today. Just feeling a little tired.     Objective:  Modalities:   Precautions:   Exercises:  Exercise Reps/ Time Weight/ Level Comments 24     Upright bike 5m L3     Scifit 5m L3            Standing       Gastroc stretch 3x30\"   X   Hip ABD 20x ea Blue  X   Hip EXT 20x ea Blue  X   Heel raises 2x20  Alternating with double leg X   SL heel raises - RLE 2x20  \" X   Resistive walk outs 2x 3 pl Added 4.11    Modified SL stance w/ weighted ball twist 20x 4lb LLE on 12\" step + blue foam X   Modified SL stance w/ weighted ball flexion 20x 4lb LLE on 12\" step + blue foam X          Matrix knee flexion 10x2 50# Added 4.11 X   Matrix knee extension 10x2 50# Added 
reps    Plan: [x] Continue current frequency toward long and short term goals.    [x] Specific Instructions for subsequent treatments: progress strengthening as able, continue with dual tasking - add counting/ ABC's to chris walking,       Frequency: 2 x/weeks for 16 visits       Time In: 247           Time Out: 338      Electronically signed by:  Mackenzie Hall PTA

## 2024-04-19 NOTE — FLOWSHEET NOTE
peg test   Increase function:UE Functional Index Score 70/80 or more points to promote increased functional abilities     Patient Goals: \"get my right side together\"      Pt. Education:  [] Yes  [x] No  [] Reviewed Prior HEP/Ed  Method of Education: [] Verbal  [] Demo  [] Written  Re:   Comprehension of Education:  [] Verbalizes understanding.  [] Demonstrates understanding.  [] Needs review.  [] Demonstrates/verbalizes HEP/Ed previously given.      Plan: [x] Continue current frequency toward short and long term goals.  [x] Specific Instructions for subsequent treatments: progress note   [] Other:       Time In: 1540  Time Out: 1623        Treatment Charges: Mins Units   []  Modalities:      []  Ultrasound     [x]  Ther Exercise 43 3   []  Manual Therapy     []  Ther Activities     []  Orthotic fit/train     []  Orthotic recheck     []  Other     Total Billable time 43 3       Electronically signed by:  Tanvi Li OTR/MAGDIEL

## 2024-04-23 ENCOUNTER — OFFICE VISIT (OUTPATIENT)
Dept: NEUROSURGERY | Age: 54
End: 2024-04-23
Payer: COMMERCIAL

## 2024-04-23 ENCOUNTER — OFFICE VISIT (OUTPATIENT)
Dept: NEUROLOGY | Age: 54
End: 2024-04-23
Payer: COMMERCIAL

## 2024-04-23 VITALS
HEART RATE: 86 BPM | HEIGHT: 71 IN | SYSTOLIC BLOOD PRESSURE: 148 MMHG | BODY MASS INDEX: 23.34 KG/M2 | DIASTOLIC BLOOD PRESSURE: 90 MMHG | WEIGHT: 166.7 LBS

## 2024-04-23 VITALS
WEIGHT: 168 LBS | SYSTOLIC BLOOD PRESSURE: 167 MMHG | HEIGHT: 71 IN | HEART RATE: 93 BPM | DIASTOLIC BLOOD PRESSURE: 97 MMHG | BODY MASS INDEX: 23.52 KG/M2

## 2024-04-23 DIAGNOSIS — I10 UNCONTROLLED HYPERTENSION: ICD-10-CM

## 2024-04-23 DIAGNOSIS — I62.9 INTRACRANIAL HEMORRHAGE (HCC): Primary | ICD-10-CM

## 2024-04-23 DIAGNOSIS — S06.360A INTRACEREBRAL HEMATOMA (HCC): Primary | ICD-10-CM

## 2024-04-23 PROBLEM — S06.36AA INTRACEREBRAL HEMATOMA: Status: ACTIVE | Noted: 2024-04-23

## 2024-04-23 PROCEDURE — 3017F COLORECTAL CA SCREEN DOC REV: CPT | Performed by: PSYCHIATRY & NEUROLOGY

## 2024-04-23 PROCEDURE — G8427 DOCREV CUR MEDS BY ELIG CLIN: HCPCS | Performed by: PSYCHIATRY & NEUROLOGY

## 2024-04-23 PROCEDURE — 99214 OFFICE O/P EST MOD 30 MIN: CPT | Performed by: NEUROLOGICAL SURGERY

## 2024-04-23 PROCEDURE — 99215 OFFICE O/P EST HI 40 MIN: CPT | Performed by: PSYCHIATRY & NEUROLOGY

## 2024-04-23 PROCEDURE — G8427 DOCREV CUR MEDS BY ELIG CLIN: HCPCS | Performed by: NEUROLOGICAL SURGERY

## 2024-04-23 PROCEDURE — 3017F COLORECTAL CA SCREEN DOC REV: CPT | Performed by: NEUROLOGICAL SURGERY

## 2024-04-23 PROCEDURE — 4004F PT TOBACCO SCREEN RCVD TLK: CPT | Performed by: NEUROLOGICAL SURGERY

## 2024-04-23 PROCEDURE — 3077F SYST BP >= 140 MM HG: CPT | Performed by: PSYCHIATRY & NEUROLOGY

## 2024-04-23 PROCEDURE — 4004F PT TOBACCO SCREEN RCVD TLK: CPT | Performed by: PSYCHIATRY & NEUROLOGY

## 2024-04-23 PROCEDURE — 3080F DIAST BP >= 90 MM HG: CPT | Performed by: PSYCHIATRY & NEUROLOGY

## 2024-04-23 PROCEDURE — 3080F DIAST BP >= 90 MM HG: CPT | Performed by: NEUROLOGICAL SURGERY

## 2024-04-23 PROCEDURE — G8420 CALC BMI NORM PARAMETERS: HCPCS | Performed by: PSYCHIATRY & NEUROLOGY

## 2024-04-23 PROCEDURE — G8420 CALC BMI NORM PARAMETERS: HCPCS | Performed by: NEUROLOGICAL SURGERY

## 2024-04-23 PROCEDURE — 3077F SYST BP >= 140 MM HG: CPT | Performed by: NEUROLOGICAL SURGERY

## 2024-04-23 RX ORDER — BLOOD PRESSURE TEST KIT
1 KIT MISCELLANEOUS 2 TIMES DAILY
Qty: 1 EACH | Refills: 0 | Status: SHIPPED | OUTPATIENT
Start: 2024-04-23

## 2024-04-23 NOTE — PROGRESS NOTES
lateral ventricle.     Stable remote ischemic changes in the basal ganglia.    My read: agree      ASSESSMENT AND PLAN:       Patient Active Problem List   Diagnosis    Uncontrolled hypertension    Chronic shoulder pain    Acute low back pain    Cervical pain    Neck sprain    Lumbar sprain    DDD (degenerative disc disease), cervical    DDD (degenerative disc disease), lumbar    Smoker    Acquired spondylolisthesis    Intraparenchymal hemorrhage of brain (HCC)    Intracranial hemorrhage (HCC)    Hyponatremia    Alcohol withdrawal syndrome, with delirium (HCC)    Polysubstance abuse (HCC)    ETOH abuse    Vertebral artery occlusion, left    Asymptomatic vertebral artery stenosis, right    Intracerebral hematoma (HCC)         A/P:  This is a 53 y.o. male with Intracerebral hematoma (HCC)       Patient with some right sided weakness and slurred speech, much improved since ICH.    Patient continues to smoke. Patient was counseled on the dangers of smoking, urged to quit, and offered smoking cessation aids.    Patient will follow up as needed.    By signing my name below, I, Qasim Riggs, attest that this documentation has been prepared under the direction and in the presence of Faustina Weems DO. Electronically signed: Ban Banks,     04/23/2024    This note was created using voice recognition software. There may be inaccuracies of transcription  that are inadvertently overlooked prior to the signature.  There is any questions about the transcription please contact me.

## 2024-04-23 NOTE — PROGRESS NOTES
Endovascular Neurosurgery Progress Note    SUBJECTIVE:     54 yo male with history of HTN, alcoholism, and polysubstance abuse who presents with acute onset aphasia and right sided weakness. Initial -210. CT head showed acute left basal ganglia IPH, ICH score 0. 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     Review of Systems:  CONSTITUTIONAL:  negative for fevers, chills, fatigue and malaise    EYES:  negative for double vision, blurred vision and photophobia     HEENT:  negative for tinnitus, epistaxis and sore throat    RESPIRATORY:  negative for cough, shortness of breath, wheezing    CARDIOVASCULAR:  negative for chest pain, palpitations, syncope, edema    GASTROINTESTINAL:  negative for nausea, vomiting    GENITOURINARY:  negative for incontinence    MUSCULOSKELETAL:  negative for neck or back pain    NEUROLOGICAL:  Negative for weakness and tingling  negative for headaches and dizziness    PSYCHIATRIC:  negative for anxiety      Review of systems otherwise negative.      OBJECTIVE:     Vitals:    01/26/24 1141   BP: 139/83   Pulse: 74   Resp: 17   Temp: 98.2 °F (36.8 °C)   SpO2: 96%        General:  Gen: normal habitus, NAD  HEENT: NCAT, mucosa moist  Cvs: RRR, S1 S2 normal  Resp: symmetric unlabored breathing  Abd: s/nd/nt  Ext: no edema  Skin: no lesions seen, warm and dry    Neuro:  Gen: awake and alert, oriented x3.   Lang/speech: no aphasia, very dysarthric. Follows all commands.  CN: PERRL, EOMI, VFF, V1-3 intact, face symmetric, hearing intact, shoulder shrug symmetric, tongue midline  Motor: grossly 5/5 UE and LE on the left, right arm 4+/5, right leg 4+/5 (no drift)   Sense: LT intact in all 4 ext.  Coord: FTN and HTS intact b/l  DTR: deferred  Gait: normal with a walker    NIH Stroke Scale:   1a  Level of consciousness: 0 - alert; keenly responsive   1b. LOC questions:  0 - answers both questions correctly   1c. LOC commands: 0 - performs both tasks

## 2024-04-24 ENCOUNTER — HOSPITAL ENCOUNTER (OUTPATIENT)
Dept: OCCUPATIONAL THERAPY | Age: 54
Setting detail: THERAPIES SERIES
Discharge: HOME | End: 2024-04-24
Payer: COMMERCIAL

## 2024-04-24 ENCOUNTER — HOSPITAL ENCOUNTER (OUTPATIENT)
Dept: SPEECH THERAPY | Age: 54
Setting detail: THERAPIES SERIES
Discharge: HOME | End: 2024-04-24
Payer: COMMERCIAL

## 2024-04-24 ENCOUNTER — HOSPITAL ENCOUNTER (OUTPATIENT)
Dept: PHYSICAL THERAPY | Age: 54
Setting detail: THERAPIES SERIES
Discharge: HOME | End: 2024-04-24
Payer: COMMERCIAL

## 2024-04-24 PROCEDURE — 92507 TX SP LANG VOICE COMM INDIV: CPT

## 2024-04-24 PROCEDURE — 97116 GAIT TRAINING THERAPY: CPT

## 2024-04-24 PROCEDURE — 97129 THER IVNTJ 1ST 15 MIN: CPT

## 2024-04-24 PROCEDURE — 97110 THERAPEUTIC EXERCISES: CPT

## 2024-04-24 NOTE — FLOWSHEET NOTE
Completed    Digi-Flex 50 reps blue Completed    Flexbar   strength  Wrist Flexion/Ext  Wrist pronation  Wrist supination  Wrist ulnar deviation  Wrist radial dev  Shoulder Adb  Shoulder Add   15-20 reps green Completed    Power Web- composite fist 30 reps Blue  Completed    O'Ede Pegboard 4 rows Remove with tweezers  Not Completed   Consuelo Pickup 1 series  Not Completed    Other:      Assessment: [x] Progressing toward goals. Progress note completed this date, see progress note for specific measurements.     [] No change.  [] Other     [x] Patient would continue to benefit from skilled occupational therapy services in order to: Improve and Maximize  functional /grasp, Strength, and Coordination deficit in order to Ensure safe return to work, perform ADLs with increased safety, and improve functional use of UE in ADL performance     Short Term Goals: (  8    Treatments)  Increase R  strength to 60 pounds MET and all R pinches by 5 pounds for ease with daily activities IMPROVED  Increase R FMC as evidenced by a decrease of 15 seconds on 9 hole peg test MET  Increase function:UE Functional Index Score 58/80 or more points to promote increased functional abilities MET  Patient to be independent with home exercise program as demonstrated by performance with correct form without cues MET     Long Term Goals: (  16  Treatments)  Increase R  strength to 70 pounds and all R pinches by 3 pounds for ease with daily activities   Increase R FMC as evidenced by a decrease of 10 seconds on 9 hole peg test   Increase function:UE Functional Index Score 70/80 or more points to promote increased functional abilities     Patient Goals: \"get my right side together\"      Pt. Education:  [] Yes  [x] No  [] Reviewed Prior HEP/Ed  Method of Education: [] Verbal  [] Demo  [] Written  Re:   Comprehension of Education:  [] Verbalizes understanding.  [] Demonstrates understanding.  [] Needs review.  [] Demonstrates/verbalizes

## 2024-04-24 NOTE — FLOWSHEET NOTE
[x] [facility]  Outpatient Rehabilitation &  Therapy  [address]  [phone]  F: [phone]     Speech Therapy Daily Treatment Note    Date:  2024  Patient Name:  Jagdeep Quintana Sr.    :  1970  MRN: [id number]  Physician: Maribell Dai MD       [facility]: [facility] (LIMIT OF 20 EACH FOR PT/OT/ST VISITS PER JIM YR, 20 REMAIN, HARD MAX LIMIT, AUTH AFTER EVAL THROUGH [employer] #[id number] or [email or web address], [facility] MEDICAID COVERS PRIMARY COINSURANCE    Medical Diagnosis: I62.9 - Intracranial hemorrhage (HCC); I61.9 - Intraparenchymal hemorrhage of brain (HCC)       Rehab Codes: R41.89 - Moderate cognitive impairment; I69.320 Aphasia following cerebrovascular accident; i69.322 Dysarthria following cerebrovascular accident; i69.391 Dysphagia following cerebrovascular accident    Onset Date: 24     Next  Appt: VIRY Estrella , Dr. Weems     Visit# / total visits: ; Progress note due at visit 8   Cancels/No Shows: 0/2    Subjective:  Pt is in good spirits.  He has nothing new to report.    Pain:  [] Yes  [x] No Location:  N/A Pain Rating: (0-10 scale) 0/10  Pain altered Tx:  [x] No  [] Yes  Action:  Comments:     Objective:  Short-term Goals  Timeframe for Short-term Goals: To be met in 8 visits     Goal 1: Patient will complete STM/delayed recall tasks at 80% accuracy independently  ST was able to provide two tasks to stimulate delayed recall.  The first involved showing pt 5 pictures to study and memorize using compensatory strategies.  The second used six pictures.  He was given a 20-minute delay with distraction (first was working on oral-motor exercises and the second was a paper/pencil activity; conversation was also utilized as a distraction).  On the first activity, pt recalled 5/5 pictures and on the second he recalled 3/6 pictures improving to 5/6 given min cue.    Goal 2: Patient will complete tasks targeting verbal expressive/naming

## 2024-04-24 NOTE — PROGRESS NOTES
[x] [facility]  Outpatient Rehabilitation &  Therapy  [address]  [phone]  F: [phone] [] [facility]  Outpatient Rehabilitation &  Therapy  [address]  P: [phone]  F: [phone] [] [facility]  Outpatient Rehabilitation &  Therapy  [address]  P: [phone]  F: [phone] [] [facility]  Outpatient Rehabilitation &  Therapy  [address]   P: [phone]  F: [phone] [] [facility]   Outpatient Rehabilitation   & Therapy  [address]  P: [phone]   F: [phone]     Occupational Therapy Progress Note    Date: 2024      Patient: Jagdeep Quintana Sr.  : 1970  MRN: [id number] Referring Provider:  Maribell Dai MD  Insurance: Other, [place],16 VISITS AUTHORIZED, 2024 to 2024   Medical Diagnosis: I61.9 (ICD-10-CM) - Intraparenchymal hemorrhage of brain,             I62.9 (ICD-10-CM) - Intracranial hemorrhage   Rehab Codes: numbness R20.2,, lack of coordination R27.8,, fine motor skills loss R29.818,, muscle weakness generalized M62.81,, or muscle wasting of hand M62.54,   Onset Date: 24                 Next  Appt: 24- Dr. Taylor   Total visits attended: 10/16  Cancels/No shows: 1/3  Date range of services: 24 to 24    Subjective:  Pain:  [] Yes  [x] No               Location: N/A              Pain Rating: (0-10 scale) 0/10  Pain altered Tx:  [x] No  [] Yes  Action: NA  Pt Comments: Reports he is quitting smoking after today due to needing a stent placed for blockage within the next year, states he was told that he is conner to be up talking/walking at follow up appointment, still needs to complete on road driving assessment.    Objective:  Current Functional Level: 65/80 (up 21) functionally impaired as measured with the Upper Extremity Functional Index Survey.  0-80 scale, with 80 = no Deficits  (The UEFI model

## 2024-04-24 NOTE — FLOWSHEET NOTE
[x] [facility]  Outpatient Rehabilitation &  Therapy  [address]  [phone]  [phone]     Physical Therapy Daily Treatment Note    Date:  2024  Patient Name:  Jagdeep Quintana Sr.      :  1970    MRN: [id number]  Physician:  Maribell Dai MD (hospitalist)  PCP is Dr. Francisco Ro at [address] [place] [address] - phone: [phone]  (sees Dr. Ro today, 24) Fax [phone]  Insurance: Julissa Amin, [facility] Medicaid; 20 visits hard max,    Approval was received for 8 visits, from 24 to 24.  Authorization number [id number]    Approval was received for 8 additional visits (16 total), from 24 to 24.  Authorization number [id number].   Medical Diagnosis:   I62.9 (ICD-10-CM) - Intracranial hemorrhage (HCC)   I61.9 (ICD-10-CM) - Intraparenchymal hemorrhage of brain (HCC)   Rehab Codes: R 53.1, M 62.81, R 29.3, Z 91.81, R 26.29  Onset date: 24                 Next 's appt.:  Dr. Estrella , Dr. Weems     Visit# / total visits:     Cancels/No Shows:     Subjective:    Pain:  [] Yes  [x] No  Location: N/A         Pain Rating: (0-10 scale) 0/10  Pain altered Tx:  [] Yes  [x] No  Action:  Comments: no recent episodes of pain flair ups. Has been feeling really good after all 3 therapy appt's. Reports he was able to \"walk normal\" all last week without difficulties.       Objective:  Modalities:   Precautions:   Exercises:  Exercise Reps/ Time Weight/ Level Comments 24     Upright bike 5m L3     Scifit 5m L3  X          Standing   Added wts 4.19    Gastroc stretch 3x30\"   X   Hip ABD 20x ea 2 lbs  X   Hip EXT 20x ea 2 lbs 4\" box X   Heel raises 2x20  Alternating with double leg X   SL heel raises - RLE 2x20  \" X   Lunges - Fwd 20x ea  Added 24 X   Alt Toe taps On Step - Standing on foam 3 min Blue foam  12 inch Added 24 X          Treadmill Fwd:   Bkwd:   7 min  5 min 1.8 mph  0.6 mph Added 24  Fwd walking - last 2 minutes -

## 2024-04-26 ENCOUNTER — HOSPITAL ENCOUNTER (OUTPATIENT)
Dept: PHYSICAL THERAPY | Age: 54
Setting detail: THERAPIES SERIES
Discharge: HOME | End: 2024-04-26
Payer: COMMERCIAL

## 2024-04-26 ENCOUNTER — HOSPITAL ENCOUNTER (OUTPATIENT)
Dept: OCCUPATIONAL THERAPY | Age: 54
Setting detail: THERAPIES SERIES
Discharge: HOME | End: 2024-04-26
Payer: COMMERCIAL

## 2024-04-26 PROCEDURE — 97110 THERAPEUTIC EXERCISES: CPT

## 2024-04-26 NOTE — FLOWSHEET NOTE
[x] [facility]  Outpatient Rehabilitation &  Therapy  [address]  [phone]  [phone]     Physical Therapy Daily Treatment Note    Date:  2024  Patient Name:  Jagdeep Quintana Sr.      :  1970    MRN: [id number]  Physician:  Maribell Dai MD (hospitalist)  PCP is Dr. Francisco Ro at [facility] - phone: [phone]  (sees Dr. Ro today, 24) Fax [phone]  Insurance: Julissa Amin, [facility] Medicaid; 20 visits hard max,    Approval was received for 8 visits, from 24 to 24.  Authorization number [id number]    Approval was received for 8 additional visits (16 total), from 24 to 24.  Authorization number [id number].   Medical Diagnosis:   I62.9 (ICD-10-CM) - Intracranial hemorrhage (HCC)   I61.9 (ICD-10-CM) - Intraparenchymal hemorrhage of brain (HCC)   Rehab Codes: R 53.1, M 62.81, R 29.3, Z 91.81, R 26.29  Onset date: 24                 Next 's appt.:  Dr. Estrella , Dr. Weems     Visit# / total visits:     Cancels/No Shows: 2    Subjective:    Pain:  [] Yes  [x] No  Location: N/A         Pain Rating: (0-10 scale) 0/10  Pain altered Tx:  [] Yes  [x] No  Action:  Comments: no recent episodes of pain flair ups. Has been feeling really good after all 3 therapy appt's. Reports he was able to \"walk normal\" all last week without difficulties.       Objective:  Modalities:   Precautions:   Exercises:  Exercise Reps/ Time Weight/ Level Comments 24     Upright bike 8 min L 5  xx   Scifit 5m L3            Standing   Added wts 4.19    Gastroc stretch 3x30\"      Hip ABD 20x ea 2 lbs     Hip EXT 20x ea 2 lbs 4\" box    Heel raises 2x20  Alternating with double leg    SL heel raises - RLE 2x20  \"    Lunges - Fwd 20x ea  Added 24    Alt Toe taps On Step - Standing on foam 3 min Blue foam  12 inch Added 24           Treadmill Fwd:   Bkwd:   7 min  5 min 1.8 mph  0.6 mph Added 24  Fwd walking - last 2 minutes -

## 2024-04-26 NOTE — FLOWSHEET NOTE
[x] [facility]  Outpatient Rehabilitation &  Therapy  [address]  [phone]  F: [phone] [] [facility]  Outpatient Rehabilitation &  Therapy  [address]   [address]  P: [phone]  F: [phone] [] [facility]  Outpatient Rehabilitation &  Therapy  [address]  P: [phone]  F: [phone] [] [facility]  Outpatient Rehabilitation &  Therapy  [address]   P: [phone]  F: [phone] [] [facility]   Outpatient Rehabilitation   & Therapy  [address]  P: [phone]   F: [phone]     Occupational Therapy Daily Treatment Note    Date:  2024  Patient Name:  Jagdeep Quintana Sr.    :  1970  MRN: [id number]  Referring Provider:  Maribell Dai MD  Insurance: Other, [place],16 VISITS AUTHORIZED, 2024 to 2024   Medical Diagnosis: I61.9 (ICD-10-CM) - Intraparenchymal hemorrhage of brain,             I62.9 (ICD-10-CM) - Intracranial hemorrhage   Rehab Codes: numbness R20.2,, lack of coordination R27.8,, fine motor skills loss R29.818,, muscle weakness generalized M62.81,, or muscle wasting of hand M62.54,   Onset Date: 24                 Next  Appt: 24- Dr. Taylor   Visit# / total visits: ; Progress note for patient completed at visit 10   Cancels/No Shows: 1/2      Subjective:    Pain:  [] Yes  [x] No  Location: N/A  Pain Rating: (0-10 scale) 0/10  Pain altered Tx:  [x] No  [] Yes  Action: NA  Pt Comments: No complaints.     Precautions: none  Surgery procedure and date: NA      Objective:  Today's Treatment:  Modalities: NA  Exercises:  EXERCISE    REPS/     TIME  WEIGHT/    LEVEL COMMENTS   Foam Cube 15 reps green Not Completed, HEP   Clothespin Foam Cubes 1 series black Completed    Hand Exerciser 50 reps 50 pounds Completed    Grooved Pegboard 1 series  Completed    [place] Translation 1 series  Completed    Digi-Flex 50 reps blue

## 2024-05-01 ENCOUNTER — HOSPITAL ENCOUNTER (OUTPATIENT)
Dept: PHYSICAL THERAPY | Age: 54
Setting detail: THERAPIES SERIES
Discharge: HOME | End: 2024-05-01
Payer: COMMERCIAL

## 2024-05-01 ENCOUNTER — HOSPITAL ENCOUNTER (OUTPATIENT)
Dept: OCCUPATIONAL THERAPY | Age: 54
Setting detail: THERAPIES SERIES
Discharge: HOME | End: 2024-05-01
Payer: COMMERCIAL

## 2024-05-01 ENCOUNTER — HOSPITAL ENCOUNTER (OUTPATIENT)
Dept: SPEECH THERAPY | Age: 54
Setting detail: THERAPIES SERIES
Discharge: HOME | End: 2024-05-01
Payer: COMMERCIAL

## 2024-05-01 PROCEDURE — 97110 THERAPEUTIC EXERCISES: CPT

## 2024-05-01 NOTE — SIGNIFICANT EVENT
[x] [facility]  Outpatient Rehabilitation &  Therapy  [address]  [phone]  [phone] [] [facility]  Outpatient Rehabilitation &  Therapy  [address]  P: [phone]  F: [phone] [] [facility]  Outpatient Rehabilitation &  Therapy  [address]  P: [phone]  F: [phone] [] [facility]  Outpatient Rehabilitation &  Therapy  [address]  [phone]  [phone] [] [facility]  Outpatient Rehabilitation &  Therapy  [address]   P: [phone]  F: [phone]  [] [facility]  Outpatient Rehabilitation &  Therapy  [address]  P: [phone]  F: [phone] [] [facility]  Outpatient Rehabilitation &  Therapy  [address]  P: [phone]  F: [phone] [] [facility]  Outpatient Rehabilitation &  Therapy  [address]  P: [phone]  F: [phone] [] [facility]  Outpatient Rehabilitation &  Therapy  [address]  P: [phone]  F: [phone]  [] [facility] Outpatient Rehabilitation &  Therapy  [address]  P: [phone]  F: [phone]     Therapy Cancel/No Show note    Date: 2024  Patient: Jagdeep Quintana Sr.  : 1970  MRN: [id number]    Cancels/No Shows to date:     For today's appointment patient:    [x]  Cancelled    [] Rescheduled appointment    [] No-show     Reason given by patient:    []  Patient ill    []  Conflicting appointment    [] No transportation      [] Conflict with work    [] No reason given    [] Weather related    [] COVID-19    [x] Other:      Comments:  Pt arrived for PT/OT but stated he did not want to stay for ST so he could go to the bar.      [x] Next appointment was confirmed    Electronically signed by: Erin Orr SLP

## 2024-05-01 NOTE — FLOWSHEET NOTE
ankles  Added 5.1 x          Treadmill Fwd:   Bkwd:   7 min  5 min 1.8 mph  0.8 mph      x   Step up on 12\" - lateral to foam - down to ground with each leg 10 x ea  4 positions - right lead to 12\", left lead to 12\", right lead to foam, left lead to foam                         Resistive walk outs 3x ea 3 pl  x   Modified SL stance w/ weighted ball twist 20x 4lb LLE on 12\" step + blue foam    Modified SL stance w/ weighted ball flexion 20x 4lb LLE on 12\" step + blue foam - reaching up left and down right           Matrix knee flexion 3x10 50#  x   Matrix knee extension 3x10 50#  x          Leg Press 3x10 60 lb  x          Thera bands       Ext 10x2 blue     Triceps  10x2 Blue            Corner stretch 3x30\"      DB Flexion 2x10 3lb     DB ABD 2x10 3lb     DB bicep curls 3x10 3lb     Wall taps  15x ea [place], out, down = 1    Wall push ups 3x10             BITS  Cognitive  - memory  Visual Scanning  Handwriting             GAIT  4lb RLE     Hurdles 7min total 4 hurdles 1) FWD - RLE lead  2) FWD - LLE lead  3) lateral - both LE lead  4) FWD/RETRO    Floor ladder 4 x  2 feet in each  Lateral 2 feet in ea  High marches    Multiple sets for all including for timed sets.  I.e: timing patient then challenging him to beat his time on the next lap  4/26/24- in in out out from side.  Right leg leading to the right; left leg leading to the left           Supine   Held all    Bridges       Prone       Prone hip ext       Prone knee flexion       Sidelying       Hip ABD        Clamshells       Other:       Treatment Charges: Mins Units   []  Modalities     [x]  Ther Exercise 46 3   []  Manual Therapy     []  Ther Activities     []  Neuro Re-ed     []  Vasocompression     [] Gait     []  Other     Total Billable time 46 3       Assessment: [x] Progressing toward goals.  Started with retro gait on treadmill to improve hip extension with SBA.  Focused treatment on hip strengthening with focus on glute medius.  Added monster walks

## 2024-05-01 NOTE — FLOWSHEET NOTE
[x] [facility]  Outpatient Rehabilitation &  Therapy  [address]  [phone]  F: [phone] [] [facility]  Outpatient Rehabilitation &  Therapy  [address]   [address]  P: [phone]  F: [phone] [] [facility]  Outpatient Rehabilitation &  Therapy  [address]  P: [phone]  F: [phone] [] [facility]  Outpatient Rehabilitation &  Therapy  [address]   P: [phone]  F: [phone] [] [facility]   Outpatient Rehabilitation   & Therapy  [address]  P: [phone]   F: [phone]     Occupational Therapy Daily Treatment Note    Date:  2024  Patient Name:  Jagdeep Quintana Sr.    :  1970  MRN: [id number]  Referring Provider:  Maribell Dai MD  Insurance: Other, [place],16 VISITS AUTHORIZED, 2024 to 2024   Medical Diagnosis: I61.9 (ICD-10-CM) - Intraparenchymal hemorrhage of brain,             I62.9 (ICD-10-CM) - Intracranial hemorrhage   Rehab Codes: numbness R20.2,, lack of coordination R27.8,, fine motor skills loss R29.818,, muscle weakness generalized M62.81,, or muscle wasting of hand M62.54,   Onset Date: 24                 Next  Appt: 24- Dr. Taylor   Visit# / total visits: ; Progress note for patient completed at visit 10   Cancels/No Shows: 1/2      Subjective:    Pain:  [] Yes  [x] No  Location: N/A  Pain Rating: (0-10 scale) 0/10  Pain altered Tx:  [x] No  [] Yes  Action: NA  Pt Comments: No complaints, reports wanting to cancel speech today without reason.     Precautions: none  Surgery procedure and date: NA      Objective:  Today's Treatment:  Modalities: NA  Exercises:  EXERCISE    REPS/     TIME  WEIGHT/    LEVEL COMMENTS   Foam Cube 15 reps green Not Completed, HEP   Clothespin Foam Cubes 1 series black Completed    Hand Exerciser 50 reps 50 pounds Completed    Grooved Pegboard 1 series  Completed    Sahra

## 2024-05-03 ENCOUNTER — HOSPITAL ENCOUNTER (OUTPATIENT)
Dept: OCCUPATIONAL THERAPY | Age: 54
Setting detail: THERAPIES SERIES
Discharge: HOME | End: 2024-05-03
Payer: COMMERCIAL

## 2024-05-03 ENCOUNTER — HOSPITAL ENCOUNTER (OUTPATIENT)
Dept: PHYSICAL THERAPY | Age: 54
Setting detail: THERAPIES SERIES
Discharge: HOME | End: 2024-05-03
Payer: COMMERCIAL

## 2024-05-03 PROCEDURE — 97110 THERAPEUTIC EXERCISES: CPT

## 2024-05-03 PROCEDURE — 97116 GAIT TRAINING THERAPY: CPT

## 2024-05-03 NOTE — FLOWSHEET NOTE
blue Completed    Flexbar   strength  Wrist Flexion/Ext  Wrist pronation  Wrist supination  Wrist ulnar deviation  Wrist radial dev  Shoulder Adb  Shoulder Add   15-20 reps green Completed    Power Web- composite fist 30 reps Blue  Completed    O'Ede Pegboard 4 rows Remove with tweezers  Completed   Consuelo Pickup 1 series  Not Completed    Other:      Assessment: [x] Progressing toward goals. Significant muscle wasting observed in thenar eminence of R hand. Strength and FMC improving overall. Forearm, wrist, hand, thumb and digits 2-5 ROM WFL, able to form full composite fist and oppose to all digits. Continued strengthening and FMC exercises, easily frustrated when dropping objects or taking increased time to complete activities. Approaching discharge.     [] No change.  [] Other     [x] Patient would continue to benefit from skilled occupational therapy services in order to: Improve and Maximize  functional /grasp, Strength, and Coordination deficit in order to Ensure safe return to work, perform ADLs with increased safety, and improve functional use of UE in ADL performance     Short Term Goals: (  8    Treatments)  Increase R  strength to 60 pounds MET and all R pinches by 5 pounds for ease with daily activities IMPROVED  Increase R FMC as evidenced by a decrease of 15 seconds on 9 hole peg test MET  Increase function:UE Functional Index Score 58/80 or more points to promote increased functional abilities MET  Patient to be independent with home exercise program as demonstrated by performance with correct form without cues MET     Long Term Goals: (  16  Treatments)  Increase R  strength to 70 pounds and all R pinches by 3 pounds for ease with daily activities   Increase R FMC as evidenced by a decrease of 10 seconds on 9 hole peg test   Increase function:UE Functional Index Score 70/80 or more points to promote increased functional abilities     Patient Goals: \"get my right side

## 2024-05-03 NOTE — FLOWSHEET NOTE
Standing Shoulder Flexion with Resistance  - 1 x daily - 7 x weekly - 3 sets - 10 reps  - Standing Single Arm Shoulder Abduction with Resistance  - 1 x daily - 7 x weekly - 3 sets - 10 reps  - Standing Bicep Curls with Resistance  - 1 x daily - 7 x weekly - 3 sets - 10 reps  - Mini Lunge  - 1 x daily - 7 x weekly - 3 sets - 10 reps  - Lateral Lunge  - 1 x daily - 7 x weekly - 3 sets - 10 reps  - Standing Hip Extension with Anchored Resistance  - 1 x daily - 7 x weekly - 3 sets - 10 reps  - Standing Hip Abduction with Anchored Resistance  - 1 x daily - 7 x weekly - 3 sets - 10 reps  - Standing Hip Flexion with Anchored Resistance  - 1 x daily - 7 x weekly - 3 sets - 10 reps    Plan: [x] Continue current frequency toward long and short term goals.    [x] Specific Instructions for subsequent treatments: Plan completion after next Rx.    Time In: 1440          Time Out: 1530      Electronically signed by:  Nataliya Rangel, PT

## 2024-05-08 ENCOUNTER — HOSPITAL ENCOUNTER (OUTPATIENT)
Dept: PHYSICAL THERAPY | Age: 54
Setting detail: THERAPIES SERIES
Discharge: HOME | End: 2024-05-08
Payer: COMMERCIAL

## 2024-05-08 ENCOUNTER — HOSPITAL ENCOUNTER (OUTPATIENT)
Dept: OCCUPATIONAL THERAPY | Age: 54
Setting detail: THERAPIES SERIES
Discharge: HOME | End: 2024-05-08
Payer: COMMERCIAL

## 2024-05-08 ENCOUNTER — HOSPITAL ENCOUNTER (OUTPATIENT)
Dept: SPEECH THERAPY | Age: 54
Setting detail: THERAPIES SERIES
Discharge: HOME | End: 2024-05-08
Payer: COMMERCIAL

## 2024-05-08 NOTE — FLOWSHEET NOTE
[x] [facility]       Occupational Therapy       [address]       Phone: [phone]       Fax: [phone] [] [facility] Occupational  Therapy at [place]       [address]       Phone: [phone]       Fax: [phone] [] [facility]  Outpatient Rehabilitation &  Therapy  [address]   [address]  P: [phone]  F: [phone]           Occupational Therapy Cancel/No Show note    Date: 2024  Patient: Jagdeep WOOD Mariano Sr.  : 1970  MRN: [id number]  Cancels/No Shows to date:     For today's appointment patient:  [x]  Cancelled  []  Rescheduled appointment  []  No-show     Reason given by patient:  []  Patient ill  []  Conflicting appointment  []  No transportation    []  Conflict with work  []  No reason given  [x]  Other:     Comments: Patient LVM to cancel therapy appointments, states he did not know he had it, next OT appointment scheduled for 5/10/24.    Electronically signed by: ERIN Frey/MAGDIEL

## 2024-05-08 NOTE — SIGNIFICANT EVENT
[x] [facility]  Outpatient Rehabilitation &  Therapy  [address]  [phone]  [phone] [] [facility]  Outpatient Rehabilitation &  Therapy  [address]  P: [phone]  F: [phone] [] [facility]  Outpatient Rehabilitation &  Therapy  [address]  P: [phone]  F: [phone] [] [facility]  Outpatient Rehabilitation &  Therapy  [address]  [phone]  [phone] [] [facility]  Outpatient Rehabilitation &  Therapy  [address]   P: [phone]  F: [phone]  [] [facility]  Outpatient Rehabilitation &  Therapy  [address]  P: [phone]  F: [phone] [] [facility]  Outpatient Rehabilitation &  Therapy  [address]  P: [phone]  F: [phone] [] [facility]  Outpatient Rehabilitation &  Therapy  [address]  P: [phone]  F: [phone] [] [facility]  Outpatient Rehabilitation &  Therapy  [address]  P: [phone]  F: [phone]  [] [facility] Outpatient Rehabilitation &  Therapy  [address]  P: [phone]  F: [phone]     Therapy Cancel/No Show note    Date: 2024  Patient: Jagdeep Quintana Sr.  : 1970  MRN: [id number]    Cancels/No Shows to date:     For today's appointment patient:    [x]  Cancelled    [] Rescheduled appointment    [] No-show     Reason given by patient:    []  Patient ill    []  Conflicting appointment    [] No transportation      [] Conflict with work    [] No reason given    [] Weather related    [] COVID-19    [x] Other:      Comments:  Pt cx today's visit stating he didn't he had it.      [x] Next appointment was confirmed    Electronically signed by: Erin rOr SLP

## 2024-05-08 NOTE — FLOWSHEET NOTE
[x] [facility]  Outpatient Rehabilitation &  Therapy  [address]  [phone]  [phone]     Therapy Cancel/No Show note    Date: 2024  Patient: Jagdeep Quintana .  : 1970  MRN: [id number]    Cancels/No Shows to date: 3/2    For today's appointment patient:    [x]  Cancelled    [] Rescheduled appointment    [] No-show     Reason given by patient:    [] Patient ill    [] Conflicting appointment    [] No transportation      [] Conflict with work    [] No reason given    [] Weather related    [] COVID-19    [x] Other:      Comments:  Pt canceled all appt's today - stating he did not know he had any appt's today. No further PT appt's scheduled as today was his last visit.         [] Next appointment was confirmed     Electronically signed by: Sharan Castellanos PTA

## 2024-05-10 ENCOUNTER — HOSPITAL ENCOUNTER (OUTPATIENT)
Dept: OCCUPATIONAL THERAPY | Age: 54
Setting detail: THERAPIES SERIES
Discharge: HOME | End: 2024-05-10
Payer: COMMERCIAL

## 2024-05-10 ENCOUNTER — HOSPITAL ENCOUNTER (OUTPATIENT)
Dept: PHYSICAL THERAPY | Age: 54
Setting detail: THERAPIES SERIES
Discharge: HOME | End: 2024-05-10
Payer: COMMERCIAL

## 2024-05-10 PROCEDURE — 97110 THERAPEUTIC EXERCISES: CPT

## 2024-05-10 NOTE — DISCHARGE SUMMARY
[x] [facility]  Outpatient Rehabilitation &  Therapy  [address]  [phone]  F: [phone] [] [facility]  Outpatient Rehabilitation &  Therapy  [address]   [address]  P: [phone]  F: [phone] [] [facility]  Outpatient Rehabilitation &  Therapy  [address]  P: [phone]  F: [phone] [] [facility]  Outpatient Rehabilitation &  Therapy  [address]   P: [phone]  F: [phone] [] [facility]   Outpatient Rehabilitation   & Therapy  [address]  P: [phone]   F: [phone]         Occupational Therapy Discharge Note    Date: 5/10/2024      Patient: Jagdeep Quintana Sr.  : 1970  MRN: [id number] Referring Provider:  Maribell Dai MD  Insurance: Other, [place],16 VISITS AUTHORIZED, 2024 to 2024   Medical Diagnosis: I61.9 (ICD-10-CM) - Intraparenchymal hemorrhage of brain,             I62.9 (ICD-10-CM) - Intracranial hemorrhage   Rehab Codes: numbness R20.2,, lack of coordination R27.8,, fine motor skills loss R29.818,, muscle weakness generalized M62.81,, or muscle wasting of hand M62.54,   Onset Date: 24                 Next  Appt: 24- Dr. Taylor   Total visits attended: 14  Cancels/No shows:   Date of initial visit: 24                Date of final visit: 5/10/24      Subjective:  Pain:  [] Yes  [x] No  Location: N/A  Pain Rating: (0-10 scale) 0/10  Pain altered Tx:  [x] No  [] Yes  Action: NA  Comments: No complaints, reports wanting to continue speech therapy, has not yet obtained his glasses or taken driving simulation in order to regain his license.     Objective:  Current Functional Level: 67/80 (up 2) functionally impaired as measured with the Upper Extremity Functional Index Survey.  0-80 scale, with 80 = no Deficits  (The UEFI model does not provide any specific cut off points that could classify the upper limb

## 2024-05-13 NOTE — DISCHARGE SUMMARY
[x] [facility]  Outpatient Rehabilitation &  Therapy  [address]  [phone]  [phone] [] [facility]  Outpatient Rehabilitation &  Therapy  [address]  P: [phone]  F: [phone] [] [facility]  Outpatient Rehabilitation &  Therapy  [address]  P: [phone]  F: [phone] [] [facility]  Outpatient Rehabilitation &  Therapy  [address]  [phone]  [phone] [] [facility]  Outpatient Rehabilitation &  Therapy  [address]   P: [phone]  F: [phone]  [] [facility]  Outpatient Rehabilitation &  Therapy  [address]  P: [phone]  F: [phone] [] [facility]  Outpatient Rehabilitation &  Therapy  [address]  P: [phone]  F: [phone] [] [facility]  Outpatient Rehabilitation &  Therapy  [address]  P: [phone]  F: [phone] [] [facility]  Outpatient Rehabilitation &  Therapy  [address]  P: [phone]  F: [phone]  [] [facility] Outpatient Rehabilitation &  Therapy  [address]  P: [phone]  F: [phone]     Physical Therapy Discharge Note    Date: 2024      Patient: Jagdeep Quintana Sr.  : 1970  MRN: [id number]    Physician:  Maribell Dai MD (hospitalist)  PCP is Dr. Francisco Ro at [address] - phone: [phone]  (sees Dr. Ro today, 24) Fax [phone]  Insurance: [employer] [facility] Medicaid; 20 visits hard max,    Approval was received for 8 visits, from 24 to 24.  Authorization number [id number]    Approval was received for 8 additional visits (16 total), from 24 to 24.  Authorization number [id number].   Medical Diagnosis:   I62.9 (ICD-10-CM) - Intracranial hemorrhage (HCC)   I61.9

## 2024-07-22 ENCOUNTER — OFFICE VISIT (OUTPATIENT)
Dept: PHYSICAL MEDICINE AND REHAB | Age: 54
End: 2024-07-22
Payer: COMMERCIAL

## 2024-07-22 VITALS
DIASTOLIC BLOOD PRESSURE: 88 MMHG | HEART RATE: 72 BPM | HEIGHT: 71 IN | BODY MASS INDEX: 22.4 KG/M2 | SYSTOLIC BLOOD PRESSURE: 136 MMHG | WEIGHT: 160 LBS

## 2024-07-22 DIAGNOSIS — I69.322 DYSARTHRIA DUE TO RECENT STROKE: ICD-10-CM

## 2024-07-22 DIAGNOSIS — I69.351 HEMIPLEGIA AND HEMIPARESIS FOLLOWING CEREBRAL INFARCTION AFFECTING RIGHT DOMINANT SIDE (HCC): Primary | ICD-10-CM

## 2024-07-22 PROCEDURE — G8427 DOCREV CUR MEDS BY ELIG CLIN: HCPCS | Performed by: PHYSICAL MEDICINE & REHABILITATION

## 2024-07-22 PROCEDURE — 99213 OFFICE O/P EST LOW 20 MIN: CPT | Performed by: PHYSICAL MEDICINE & REHABILITATION

## 2024-07-22 PROCEDURE — G8420 CALC BMI NORM PARAMETERS: HCPCS | Performed by: PHYSICAL MEDICINE & REHABILITATION

## 2024-07-22 PROCEDURE — 3079F DIAST BP 80-89 MM HG: CPT | Performed by: PHYSICAL MEDICINE & REHABILITATION

## 2024-07-22 PROCEDURE — 3017F COLORECTAL CA SCREEN DOC REV: CPT | Performed by: PHYSICAL MEDICINE & REHABILITATION

## 2024-07-22 PROCEDURE — 3075F SYST BP GE 130 - 139MM HG: CPT | Performed by: PHYSICAL MEDICINE & REHABILITATION

## 2024-07-22 PROCEDURE — 4004F PT TOBACCO SCREEN RCVD TLK: CPT | Performed by: PHYSICAL MEDICINE & REHABILITATION

## 2024-07-22 NOTE — PROGRESS NOTES
[facility] PHYSICAL MEDICINE AND REHABILITATION  [address] [place] [address]  [place] [address]  Dept: [phone]  Dept Fax: [phone]    Outpatient Followup Note    Jagdeep Quintana , 53 y.o., male, presents for follow up c/o of Extremity Weakness  .     HPI:     HPI  Patient with history L basal ganglia hemorrhagic CVA who is being seen in follow up today. He continues with some R sided weakness which worsens with fatigue at the end of the day. He has stopped attending therapies and feels that he is doing well functionally except as it relates to his impaired vision and restricted driving. He has been released to prn follow up with neurosurgery. He is accompanied by his significant other today.     OT has recommended vision evaluation with ophthalmology then to perform drivers test after treatment implemented. He would like to return to work if possible. He previously worked in construction. Encouraged him to check into [place] [employer] resources to address work that may be available consistent with his skills and physical/cognitive limitations after the stroke.       Past Medical History:   Diagnosis Date    Hypertension       Past Surgical History:   Procedure Laterality Date    FINGER SURGERY       Family History   Problem Relation Age of Onset    Cancer Mother         breast    Other Mother         aortic aneursyms    Stroke Father     Other Maternal Uncle         brain aneurysms     Social History     Socioeconomic History    Marital status:    Occupational History    Occupation:      Comment: laid off   Tobacco Use    Smoking status: Every Day     Current packs/day: 1.00     Average packs/day: 1 pack/day for 30.0 years (30.0 ttl pk-yrs)     Types: Cigarettes, Cigars    Smokeless tobacco: Never   Substance and Sexual Activity    Alcohol use: Yes     Alcohol/week: 12.0 standard drinks of alcohol     Types: 12 Cans of

## 2024-07-25 ENCOUNTER — TELEPHONE (OUTPATIENT)
Dept: PHYSICAL MEDICINE AND REHAB | Age: 54
End: 2024-07-25

## 2024-07-25 DIAGNOSIS — I63.9 CEREBROVASCULAR ACCIDENT (CVA), UNSPECIFIED MECHANISM (HCC): ICD-10-CM

## 2024-07-25 DIAGNOSIS — I69.351 HEMIPLEGIA AND HEMIPARESIS FOLLOWING CEREBRAL INFARCTION AFFECTING RIGHT DOMINANT SIDE (HCC): ICD-10-CM

## 2024-07-25 DIAGNOSIS — S06.360A INTRACEREBRAL HEMATOMA (HCC): ICD-10-CM

## 2024-07-25 DIAGNOSIS — H54.7 IMPAIRED VISION: Primary | ICD-10-CM

## 2024-07-25 NOTE — TELEPHONE ENCOUNTER
I will be out of the office tomorrow and Monday.  I sent Mckenzie a message in the office to ask if she could fax the information to Dr. River office for review.   yes

## 2024-07-25 NOTE — TELEPHONE ENCOUNTER
Dr. River is only seeing new patients on a case by case basis.  I need to fax over the order and recent office visit to them.      I entered order for your review and sig.  Will also need the office note to be completed.    They will review and if Dr. River will not see they can schedule with another provider in the office.

## 2024-12-16 ENCOUNTER — CLINICAL DOCUMENTATION (OUTPATIENT)
Dept: PHYSICAL MEDICINE AND REHAB | Age: 54
End: 2024-12-16

## 2024-12-16 NOTE — PROGRESS NOTES
Pt will need fu with dr. Taylor and pt needs to reschedule appt with dr. River before getting 's evaluation completed.  This was recommended by therapist and dr. Taylor    He has cancelled twice on dr. River.

## 2024-12-18 NOTE — PROGRESS NOTES
Called and left message on patient voicemail for the second time to inquire about rescheduling appt with dr. River.

## 2025-03-11 ENCOUNTER — HOSPITAL ENCOUNTER (OUTPATIENT)
Dept: CT IMAGING | Age: 55
Discharge: HOME | End: 2025-03-13
Attending: PSYCHIATRY & NEUROLOGY
Payer: COMMERCIAL

## 2025-03-11 ENCOUNTER — HOSPITAL ENCOUNTER (OUTPATIENT)
Age: 55
Discharge: HOME | End: 2025-03-11
Attending: PSYCHIATRY & NEUROLOGY
Payer: COMMERCIAL

## 2025-03-11 DIAGNOSIS — I10 UNCONTROLLED HYPERTENSION: ICD-10-CM

## 2025-03-11 DIAGNOSIS — I62.9 INTRACRANIAL HEMORRHAGE (HCC): ICD-10-CM

## 2025-03-11 DIAGNOSIS — I62.9 INTRACRANIAL HEMORRHAGE (HCC): Primary | ICD-10-CM

## 2025-03-11 LAB
CREAT SERPL-MCNC: 0.8 MG/DL (ref 0.7–1.2)
GFR, ESTIMATED: >90 ML/MIN/1.73M2

## 2025-03-11 PROCEDURE — 6360000004 HC RX CONTRAST MEDICATION: Performed by: PSYCHIATRY & NEUROLOGY

## 2025-03-11 PROCEDURE — 82565 ASSAY OF CREATININE: CPT

## 2025-03-11 PROCEDURE — 70496 CT ANGIOGRAPHY HEAD: CPT

## 2025-03-11 PROCEDURE — 36415 COLL VENOUS BLD VENIPUNCTURE: CPT

## 2025-03-11 RX ORDER — IOPAMIDOL 755 MG/ML
75 INJECTION, SOLUTION INTRAVASCULAR
Status: COMPLETED | OUTPATIENT
Start: 2025-03-11 | End: 2025-03-11

## 2025-03-11 RX ADMIN — IOPAMIDOL 75 ML: 755 INJECTION, SOLUTION INTRAVENOUS at 12:53

## 2025-03-18 ENCOUNTER — OFFICE VISIT (OUTPATIENT)
Dept: NEUROLOGY | Age: 55
End: 2025-03-18
Payer: COMMERCIAL

## 2025-03-18 VITALS
BODY MASS INDEX: 23.94 KG/M2 | HEIGHT: 71 IN | WEIGHT: 171 LBS | DIASTOLIC BLOOD PRESSURE: 88 MMHG | SYSTOLIC BLOOD PRESSURE: 152 MMHG | HEART RATE: 85 BPM

## 2025-03-18 DIAGNOSIS — I65.01 STENOSIS OF RIGHT VERTEBRAL ARTERY: Primary | ICD-10-CM

## 2025-03-18 DIAGNOSIS — I65.02 VERTEBRAL ARTERY OCCLUSION, LEFT: ICD-10-CM

## 2025-03-18 PROCEDURE — G8427 DOCREV CUR MEDS BY ELIG CLIN: HCPCS | Performed by: PSYCHIATRY & NEUROLOGY

## 2025-03-18 PROCEDURE — 4004F PT TOBACCO SCREEN RCVD TLK: CPT | Performed by: PSYCHIATRY & NEUROLOGY

## 2025-03-18 PROCEDURE — 3017F COLORECTAL CA SCREEN DOC REV: CPT | Performed by: PSYCHIATRY & NEUROLOGY

## 2025-03-18 PROCEDURE — G8420 CALC BMI NORM PARAMETERS: HCPCS | Performed by: PSYCHIATRY & NEUROLOGY

## 2025-03-18 PROCEDURE — 3077F SYST BP >= 140 MM HG: CPT | Performed by: PSYCHIATRY & NEUROLOGY

## 2025-03-18 PROCEDURE — 3079F DIAST BP 80-89 MM HG: CPT | Performed by: PSYCHIATRY & NEUROLOGY

## 2025-03-18 PROCEDURE — 99215 OFFICE O/P EST HI 40 MIN: CPT | Performed by: PSYCHIATRY & NEUROLOGY

## 2025-03-18 RX ORDER — CLOPIDOGREL BISULFATE 75 MG/1
75 TABLET ORAL DAILY
Qty: 30 TABLET | Refills: 11 | Status: SHIPPED | OUTPATIENT
Start: 2025-03-18 | End: 2025-03-18 | Stop reason: ALTCHOICE

## 2025-03-18 RX ORDER — ROSUVASTATIN CALCIUM 5 MG/1
5 TABLET, COATED ORAL NIGHTLY
Qty: 30 TABLET | Refills: 11 | Status: SHIPPED | OUTPATIENT
Start: 2025-03-18

## 2025-03-18 NOTE — PROGRESS NOTES
stenosis and chronic left VA occlusion.     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  2) Right sided weakness-residual  3) Residual speech difficulty  4) Left VA chronic occlusion     Interim hx  Driving  Walk w no cane  Independent for daily living  Lives with her mom and brother    PLAN:   - no endovascular intervention for now due to bleed   - aspirin 81mg by 1/29/2024 if no other contraindications   - R VA origin severe stenosis  - Smoking cessation   - BP Medication: HCTZ, Lisinopril, Norvasc   - JOSE RAFAEL if he has symptoms   - MRI of the brain for any post circ hits  - CTA in one year     Consultation Visit:  Patient given educational materials - see patient instructions.  Discussed use, benefit, and side effects of prescribed medications.  Personally reviewed imaging with patients and all questions answered.  Pt voiced understanding.  Patient agreed with treatment plan. Follow up as directed below. Greater than 50% of the time was for counseling and providing answer to the patient question.      The findings and the plan discussed with the patient and all her questions were answered.    Thank you very much for your referral, please do not hesitate to contact me with any questions.    Mili Estrella MD Pager: [phone]    Stroke, Neurocritical Care & Neurointervention  [facility]  [facility] Stroke [facility] [place]  Electronically signed 3/18/2025 at 2:24 PM

## 2025-04-22 ENCOUNTER — TELEPHONE (OUTPATIENT)
Dept: PHYSICAL MEDICINE AND REHAB | Age: 55
End: 2025-04-22

## 2025-04-22 NOTE — TELEPHONE ENCOUNTER
Tried to reach patient and had left a couple of message over the last few months.      I decided to call Dr. River office to see if patient kept his appt.  I was informed that patient had scheduled but cancelled twice and never called back to reschedule.

## 2025-08-19 ENCOUNTER — TELEPHONE (OUTPATIENT)
Dept: VASCULAR SURGERY | Age: 55
End: 2025-08-19

## 2025-08-20 ENCOUNTER — TELEPHONE (OUTPATIENT)
Dept: VASCULAR SURGERY | Age: 55
End: 2025-08-20